# Patient Record
Sex: FEMALE | Race: WHITE | NOT HISPANIC OR LATINO | Employment: PART TIME | URBAN - METROPOLITAN AREA
[De-identification: names, ages, dates, MRNs, and addresses within clinical notes are randomized per-mention and may not be internally consistent; named-entity substitution may affect disease eponyms.]

---

## 2017-03-06 ENCOUNTER — ALLSCRIPTS OFFICE VISIT (OUTPATIENT)
Dept: OTHER | Facility: OTHER | Age: 22
End: 2017-03-06

## 2017-04-03 ENCOUNTER — GENERIC CONVERSION - ENCOUNTER (OUTPATIENT)
Dept: OTHER | Facility: OTHER | Age: 22
End: 2017-04-03

## 2017-04-03 ENCOUNTER — APPOINTMENT (OUTPATIENT)
Dept: FAMILY MEDICINE CLINIC | Facility: CLINIC | Age: 22
End: 2017-04-03

## 2017-04-03 ENCOUNTER — LAB REQUISITION (OUTPATIENT)
Dept: LAB | Facility: HOSPITAL | Age: 22
End: 2017-04-03

## 2017-04-03 DIAGNOSIS — R53.83 OTHER FATIGUE: ICD-10-CM

## 2017-04-03 LAB
25(OH)D3 SERPL-MCNC: 9.7 NG/ML (ref 30–100)
ERYTHROCYTE [DISTWIDTH] IN BLOOD BY AUTOMATED COUNT: 13.8 % (ref 11.6–15.1)
HCT VFR BLD AUTO: 42.8 % (ref 34.8–46.1)
HGB BLD-MCNC: 13.8 G/DL (ref 11.5–15.4)
MCH RBC QN AUTO: 30.1 PG (ref 26.8–34.3)
MCHC RBC AUTO-ENTMCNC: 32.2 G/DL (ref 31.4–37.4)
MCV RBC AUTO: 93 FL (ref 82–98)
PLATELET # BLD AUTO: 280 THOUSANDS/UL (ref 149–390)
PMV BLD AUTO: 11.2 FL (ref 8.9–12.7)
RBC # BLD AUTO: 4.59 MILLION/UL (ref 3.81–5.12)
T3 SERPL-MCNC: 1.5 NG/ML (ref 0.6–1.8)
T4 FREE SERPL-MCNC: 0.91 NG/DL (ref 0.78–1.33)
TSH SERPL DL<=0.05 MIU/L-ACNC: 5.63 UIU/ML (ref 0.46–3.98)
VIT B12 SERPL-MCNC: 296 PG/ML (ref 100–900)
WBC # BLD AUTO: 9.38 THOUSAND/UL (ref 4.31–10.16)

## 2017-04-03 PROCEDURE — 82607 VITAMIN B-12: CPT | Performed by: NURSE PRACTITIONER

## 2017-04-03 PROCEDURE — 86376 MICROSOMAL ANTIBODY EACH: CPT | Performed by: NURSE PRACTITIONER

## 2017-04-03 PROCEDURE — T1015 CLINIC SERVICE: HCPCS | Performed by: NURSE PRACTITIONER

## 2017-04-03 PROCEDURE — 84443 ASSAY THYROID STIM HORMONE: CPT | Performed by: NURSE PRACTITIONER

## 2017-04-03 PROCEDURE — 84480 ASSAY TRIIODOTHYRONINE (T3): CPT | Performed by: NURSE PRACTITIONER

## 2017-04-03 PROCEDURE — 84439 ASSAY OF FREE THYROXINE: CPT | Performed by: NURSE PRACTITIONER

## 2017-04-03 PROCEDURE — 82306 VITAMIN D 25 HYDROXY: CPT | Performed by: NURSE PRACTITIONER

## 2017-04-03 PROCEDURE — 85027 COMPLETE CBC AUTOMATED: CPT | Performed by: NURSE PRACTITIONER

## 2017-04-04 LAB — THYROPEROXIDASE AB SERPL-ACNC: 11 IU/ML (ref 0–26)

## 2017-04-17 ENCOUNTER — GENERIC CONVERSION - ENCOUNTER (OUTPATIENT)
Dept: OTHER | Facility: OTHER | Age: 22
End: 2017-04-17

## 2017-04-17 ENCOUNTER — APPOINTMENT (OUTPATIENT)
Dept: FAMILY MEDICINE CLINIC | Facility: CLINIC | Age: 22
End: 2017-04-17

## 2017-04-17 PROCEDURE — T1015 CLINIC SERVICE: HCPCS | Performed by: NURSE PRACTITIONER

## 2017-11-03 ENCOUNTER — GENERIC CONVERSION - ENCOUNTER (OUTPATIENT)
Dept: OTHER | Facility: OTHER | Age: 22
End: 2017-11-03

## 2017-11-03 ENCOUNTER — APPOINTMENT (OUTPATIENT)
Dept: FAMILY MEDICINE CLINIC | Facility: CLINIC | Age: 22
End: 2017-11-03
Payer: COMMERCIAL

## 2017-11-03 DIAGNOSIS — E55.9 VITAMIN D DEFICIENCY: ICD-10-CM

## 2017-11-03 DIAGNOSIS — R10.9 ABDOMINAL PAIN: ICD-10-CM

## 2017-11-03 DIAGNOSIS — E66.3 OVERWEIGHT: ICD-10-CM

## 2017-11-03 PROCEDURE — T1015 CLINIC SERVICE: HCPCS | Performed by: FAMILY MEDICINE

## 2017-11-04 ENCOUNTER — HOSPITAL ENCOUNTER (OUTPATIENT)
Dept: ULTRASOUND IMAGING | Facility: HOSPITAL | Age: 22
Discharge: HOME | End: 2017-11-04
Payer: COMMERCIAL

## 2017-11-04 DIAGNOSIS — R10.9 ABDOMINAL PAIN: ICD-10-CM

## 2017-11-04 PROCEDURE — 76700 US EXAM ABDOM COMPLETE: CPT

## 2017-11-15 ENCOUNTER — TRANSCRIBE ORDERS (OUTPATIENT)
Dept: ADMINISTRATIVE | Facility: HOSPITAL | Age: 22
End: 2017-11-15

## 2017-11-15 ENCOUNTER — ALLSCRIPTS OFFICE VISIT (OUTPATIENT)
Dept: FAMILY MEDICINE CLINIC | Facility: CLINIC | Age: 22
End: 2017-11-15
Payer: COMMERCIAL

## 2017-11-15 ENCOUNTER — HOSPITAL ENCOUNTER (OUTPATIENT)
Dept: RADIOLOGY | Facility: HOSPITAL | Age: 22
Discharge: HOME | End: 2017-11-15
Payer: COMMERCIAL

## 2017-11-15 ENCOUNTER — APPOINTMENT (OUTPATIENT)
Dept: LAB | Facility: HOSPITAL | Age: 22
End: 2017-11-15
Payer: COMMERCIAL

## 2017-11-15 DIAGNOSIS — E66.3 OVERWEIGHT: ICD-10-CM

## 2017-11-15 DIAGNOSIS — R10.9 ABDOMINAL PAIN: ICD-10-CM

## 2017-11-15 DIAGNOSIS — R10.9 STOMACH ACHE: Primary | ICD-10-CM

## 2017-11-15 DIAGNOSIS — E55.9 VITAMIN D DEFICIENCY: ICD-10-CM

## 2017-11-15 LAB
25(OH)D3 SERPL-MCNC: 21.5 NG/ML (ref 30–100)
ALBUMIN SERPL BCP-MCNC: 4 G/DL (ref 3.5–5)
ALP SERPL-CCNC: 75 U/L (ref 46–384)
ALT SERPL W P-5'-P-CCNC: 34 U/L (ref 12–78)
ANION GAP SERPL CALCULATED.3IONS-SCNC: 12 MMOL/L (ref 4–13)
AST SERPL W P-5'-P-CCNC: 14 U/L (ref 5–45)
BACTERIA UR QL AUTO: ABNORMAL /HPF
BILIRUB SERPL-MCNC: 0.2 MG/DL (ref 0.2–1)
BILIRUB UR QL STRIP: NEGATIVE
BUN SERPL-MCNC: 12 MG/DL (ref 5–25)
CALCIUM SERPL-MCNC: 9.3 MG/DL (ref 8.3–10.1)
CHLORIDE SERPL-SCNC: 105 MMOL/L (ref 100–108)
CLARITY UR: ABNORMAL
CO2 SERPL-SCNC: 25 MMOL/L (ref 21–32)
COLOR UR: YELLOW
CREAT SERPL-MCNC: 0.75 MG/DL (ref 0.6–1.3)
ERYTHROCYTE [DISTWIDTH] IN BLOOD BY AUTOMATED COUNT: 13.5 % (ref 11.6–15.1)
EST. AVERAGE GLUCOSE BLD GHB EST-MCNC: 117 MG/DL
GFR SERPL CREATININE-BSD FRML MDRD: 116 ML/MIN/1.73SQ M
GLUCOSE SERPL-MCNC: 130 MG/DL (ref 65–140)
GLUCOSE UR STRIP-MCNC: NEGATIVE MG/DL
HBA1C MFR BLD: 5.7 % (ref 4.2–6.3)
HCG SERPL QL: NEGATIVE
HCT VFR BLD AUTO: 41.7 % (ref 34.8–46.1)
HGB BLD-MCNC: 14.1 G/DL (ref 11.5–15.4)
HGB UR QL STRIP.AUTO: NEGATIVE
KETONES UR STRIP-MCNC: NEGATIVE MG/DL
LEUKOCYTE ESTERASE UR QL STRIP: ABNORMAL
MCH RBC QN AUTO: 30.2 PG (ref 26.8–34.3)
MCHC RBC AUTO-ENTMCNC: 33.8 G/DL (ref 31.4–37.4)
MCV RBC AUTO: 89 FL (ref 82–98)
NITRITE UR QL STRIP: NEGATIVE
NON-SQ EPI CELLS URNS QL MICRO: ABNORMAL /HPF
PH UR STRIP.AUTO: 6.5 [PH] (ref 4.5–8)
PLATELET # BLD AUTO: 270 THOUSANDS/UL (ref 149–390)
PMV BLD AUTO: 10.2 FL (ref 8.9–12.7)
POTASSIUM SERPL-SCNC: 3.8 MMOL/L (ref 3.5–5.3)
PROT SERPL-MCNC: 7.5 G/DL (ref 6.4–8.2)
PROT UR STRIP-MCNC: NEGATIVE MG/DL
RBC # BLD AUTO: 4.67 MILLION/UL (ref 3.81–5.12)
RBC #/AREA URNS AUTO: ABNORMAL /HPF
SODIUM SERPL-SCNC: 142 MMOL/L (ref 136–145)
SP GR UR STRIP.AUTO: 1.02 (ref 1–1.03)
TSH SERPL DL<=0.05 MIU/L-ACNC: 1.76 UIU/ML (ref 0.46–3.98)
UROBILINOGEN UR QL STRIP.AUTO: 0.2 E.U./DL
WBC # BLD AUTO: 12.13 THOUSAND/UL (ref 4.31–10.16)
WBC #/AREA URNS AUTO: ABNORMAL /HPF

## 2017-11-15 PROCEDURE — 80053 COMPREHEN METABOLIC PANEL: CPT

## 2017-11-15 PROCEDURE — 82306 VITAMIN D 25 HYDROXY: CPT

## 2017-11-15 PROCEDURE — 81001 URINALYSIS AUTO W/SCOPE: CPT

## 2017-11-15 PROCEDURE — 85027 COMPLETE CBC AUTOMATED: CPT

## 2017-11-15 PROCEDURE — 74000 HB X-RAY EXAM OF ABDOMEN (SINGLE ANTEROPOSTERIOR VIEW): CPT

## 2017-11-15 PROCEDURE — 36415 COLL VENOUS BLD VENIPUNCTURE: CPT

## 2017-11-15 PROCEDURE — 84443 ASSAY THYROID STIM HORMONE: CPT

## 2017-11-15 PROCEDURE — T1015 CLINIC SERVICE: HCPCS | Performed by: FAMILY MEDICINE

## 2017-11-15 PROCEDURE — 84703 CHORIONIC GONADOTROPIN ASSAY: CPT

## 2017-11-15 PROCEDURE — 83036 HEMOGLOBIN GLYCOSYLATED A1C: CPT

## 2017-11-18 ENCOUNTER — APPOINTMENT (OUTPATIENT)
Dept: LAB | Facility: HOSPITAL | Age: 22
End: 2017-11-18
Payer: COMMERCIAL

## 2017-11-18 DIAGNOSIS — R10.9 STOMACH ACHE: ICD-10-CM

## 2017-11-18 LAB
GLUCOSE 1.5H P LAC PO SERPL-MCNC: 114 MG/DL (ref 70–183)
GLUCOSE 15M P LAC PO SERPL-MCNC: 120 MG/DL (ref 40–500)
GLUCOSE 1H P LAC PO UR-MCNC: 126 MG/DL (ref 70–183)
GLUCOSE 2H P LAC PO SERPL-MCNC: 106 MG/DL (ref 70–183)
GLUCOSE 30M P LAC PO SERPL-MCNC: 129 MG/DL (ref 70–183)
GLUCOSE 3H P LAC PO SERPL-MCNC: 95 MG/DL (ref 70–183)
GLUCOSE P FAST SERPL-MCNC: 98 MG/DL (ref 65–99)

## 2017-11-18 PROCEDURE — 82952 GTT-ADDED SAMPLES: CPT

## 2017-11-18 PROCEDURE — 82951 GLUCOSE TOLERANCE TEST (GTT): CPT

## 2017-11-18 PROCEDURE — 36415 COLL VENOUS BLD VENIPUNCTURE: CPT

## 2018-01-10 NOTE — PROGRESS NOTES
Assessment    1  Encounter for preventive health examination (V70 0) (Z00 00)   2  Routine health maintenance (V70 0) (Z00 00)    Plan  Encounter for vision screening    · SNELLEN VISION- POC; Status:Complete;   Done: 18QJI6870 10:14AM  Routine health maintenance    · Always use a seat belt and shoulder strap when riding or driving a motor vehicle ;  Status:Complete;   Done: 98MQB9829   · Begin a limited exercise program ; Status:Complete;   Done: 83DMG1675   · Begin or continue regular aerobic exercise  Gradually work up to at least 3 sessions of  30 minutes of exercise a week ; Status:Complete;   Done: 97EKJ8608   · Decreasing the stress in your life may help your condition improve ; Status:Complete;    Done: 43XJY7784   · Diets that are low in carbohydrates and high in protein are very popular for weight loss ;  Status:Complete;   Done: 64UIB0688   · Eat a low fat and low cholesterol diet ; Status:Complete;   Done: 30OYO8617   · Keep a diary of when and what you eat ; Status:Complete;   Done: 11PTX5885   · Some eating tips that can help you lose weight ; Status:Complete;   Done: 45WRY1653   · There are many ways to reduce your risk of catching or spreading a sexually transmitted  Infection ; Status:Complete;   Done: 69UQN1214    Discussion/Summary    Follow up for healthy steps and AHA  The treatment plan was reviewed with the patient/guardian  The patient/guardian understands and agrees with the treatment plan   The patient was counseled regarding instructions for management, importance of compliance with treatment  total time of encounter was 20 minutes and 10 minutes was spent counseling  Chief Complaint  on [facility] to become established with [facility]      History of Present Illness  25year old here today to 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, working in sales at [employer]   Resides with boyfriend  No contact with father, mom lives in [facility]  Limited contact  Mom has known drug issues  No school issues  Grades good  Was on birth control, off for one year  Noticed recent weight gain  Last seen in Dr Lana Mejía years ago  Review of Systems    Constitutional: No fever, no chills, feels well, no tiredness, no recent weight gain or weight loss  Eyes: No complaints of eye pain, no red eyes, no eyesight problems, no discharge, no dry eyes, no itching of eyes  ENT: no complaints of earache, no loss of hearing, no nose bleeds, no nasal discharge, no sore throat, no hoarseness  Cardiovascular: No complaints of slow heart rate, no fast heart rate, no chest pain, no palpitations, no leg claudication, no lower extremity edema  Respiratory: No complaints of shortness of breath, no wheezing, no cough, no SOB on exertion, no orthopnea, no PND  Gastrointestinal: No complaints of abdominal pain, no constipation, no nausea or vomiting, no diarrhea, no bloody stools  Genitourinary: No complaints of dysuria, no incontinence, no pelvic pain, no dysmenorrhea, no vaginal discharge or bleeding  Musculoskeletal: No complaints of arthralgias, no myalgias, no joint swelling or stiffness, no limb pain or swelling  Integumentary: No complaints of skin rash or lesions, no itching, no skin wounds, no breast pain or lump  Neurological: No complaints of headache, no confusion, no convulsions, no numbness, no dizziness or fainting, no tingling, no limb weakness, no difficulty walking  Psychiatric: Not suicidal, no sleep disturbance, no anxiety or depression, no change in personality, no emotional problems  Endocrine: No complaints of proptosis, no hot flashes, no muscle weakness, no deepening of the voice, no feelings of weakness  Hematologic/Lymphatic: No complaints of swollen glands, no swollen glands in the neck, does not bleed easily, does not bruise easily        Past Medical History    The active problems and past medical history were reviewed and updated today  Surgical History    The surgical history was reviewed and updated today  Family History    The family history was reviewed and updated today  Social History    · Denies alcohol consumption (V49 89) (Z78 9)   · Family members smoke outdoors only   · Lives with friend   · Never a smoker  The social history was reviewed and updated today  The social history was reviewed and is unchanged  Current Meds    The medication list was reviewed and updated today  Vitals  Signs   Recorded: 14XLB3424 10:05AM   Heart Rate: 74, L Radial  Pulse Quality: Normal, L Radial  Respiration Quality: Normal  Respiration: 16  Systolic: 256, LUE, Sitting  Diastolic: 64, LUE, Sitting  Height: 5 ft 5 in  Weight: 199 lb   BMI Calculated: 33 12  BSA Calculated: 1 97  BMI Percentile: 96 %  2-20 Stature Percentile: 61 %  2-20 Weight Percentile: 97 %    Physical Exam    Constitutional   General appearance: Abnormal   overweight  Pulmonary   Respiratory effort: No increased work of breathing or signs of respiratory distress  Auscultation of lungs: Clear to auscultation  Cardiovascular   Auscultation of heart: Normal rate and rhythm, normal S1 and S2, without murmurs  Abdomen   Abdomen: Non-tender, no masses  Lymphatic   Palpation of lymph nodes in neck: No lymphadenopathy  Musculoskeletal   Gait and station: Normal     Skin   Skin and subcutaneous tissue: Normal without rashes or lesions  Psychiatric   Orientation to person, place, and time: Normal     Mood and affect: Normal          Results/Data  SNELLEN VISION- POC [id number] 10:14AM Bg Rand     Test Name Result Flag Reference   Right Eye 20/20     Left Eye 20/40     Bilateral Eyes 20/20         Procedure    Procedure: Visual Acuity Test    Indication: routine screening  Inforrmation supplied by [employer] a Snellen chart     Results: 20/20 in both eyes without corrective device, 20/20 in the right eye without corrective device, 20/40 in the left eye without corrective device   Color vision was reported by Lprice Rn and the results were abnormal    The patient was cooperative, but tolerated the procedure well  Future Appointments    Date/Time Provider Specialty Site   05/12/2017 09:30 AM Mobile Ulises Castaneda, [address] [place] [address]     Signatures   Electronically signed by :  KELL Ansari; Mar  6 2017 10:38AM EST                       (Author)    Electronically signed by : Kaden Miller MD; Mar  6 2017  7:34PM EST                       (Author)

## 2018-01-11 NOTE — MISCELLANEOUS
Message  Recent labs reviewed, brief message left on patient's voicemail  Medication prescribed as needed  Patient to call office today or Monday morning if she has questions regarding her labs  Did not leave full detailed message on voicemail  Active Problems    1  Abdominal pain (789 00) (R10 9)   2  Depression screening (V79 0) (Z13 89)   3  Encounter for vision screening (V72 0) (Z01 00)   4  Fatigue (780 79) (R53 83)   5  Overweight (278 02) (E66 3)   6  Rash (782 1) (R21)   7  Routine health maintenance (V70 0) (Z00 00)   8  UTI symptoms (788 99) (R39 9)   9  Vitamin D deficiency (268 9) (E55 9)    Current Meds   1  Vitamin D (Ergocalciferol) 28752 UNIT Oral Capsule; TAKE 1 CAPSULE WEEKLY; Therapy: 41Cdn2797 to (Last Rx:20Wks0829)  Requested for: 11Vhs8123 Ordered    Allergies    1  No Known Drug Allergies    Plan  Abdominal pain    · Dicyclomine HCl - 10 MG Oral Capsule; TAKE 1 CAPSULE Every 8 hours  Vitamin D deficiency    · Ciprofloxacin HCl - 500 MG Oral Tablet; TAKE 1 TABLET EVERY 12 HOURS DAILY   · Vitamin D (Ergocalciferol) 33070 UNIT Oral Capsule; TAKE 1 CAPSULE  WEEKLY    Signatures   Electronically signed by :  KELL De Jesus; Nov 17 2017  2:34PM EST                       (Author)

## 2018-01-12 VITALS
HEART RATE: 74 BPM | RESPIRATION RATE: 16 BRPM | BODY MASS INDEX: 33.15 KG/M2 | WEIGHT: 199 LBS | SYSTOLIC BLOOD PRESSURE: 116 MMHG | DIASTOLIC BLOOD PRESSURE: 64 MMHG | HEIGHT: 65 IN

## 2018-01-12 VITALS
WEIGHT: 203 LBS | HEART RATE: 82 BPM | DIASTOLIC BLOOD PRESSURE: 76 MMHG | HEIGHT: 66 IN | SYSTOLIC BLOOD PRESSURE: 110 MMHG | BODY MASS INDEX: 32.62 KG/M2 | RESPIRATION RATE: 17 BRPM | TEMPERATURE: 97.6 F | OXYGEN SATURATION: 98 %

## 2018-01-13 VITALS
HEART RATE: 91 BPM | OXYGEN SATURATION: 98 % | WEIGHT: 211 LBS | RESPIRATION RATE: 17 BRPM | DIASTOLIC BLOOD PRESSURE: 76 MMHG | HEIGHT: 66 IN | SYSTOLIC BLOOD PRESSURE: 122 MMHG | TEMPERATURE: 98.4 F | BODY MASS INDEX: 33.91 KG/M2

## 2018-01-14 VITALS
BODY MASS INDEX: 32.14 KG/M2 | WEIGHT: 200 LBS | HEART RATE: 87 BPM | TEMPERATURE: 96.7 F | DIASTOLIC BLOOD PRESSURE: 70 MMHG | OXYGEN SATURATION: 98 % | RESPIRATION RATE: 16 BRPM | HEIGHT: 66 IN | SYSTOLIC BLOOD PRESSURE: 110 MMHG

## 2018-01-22 VITALS
DIASTOLIC BLOOD PRESSURE: 80 MMHG | WEIGHT: 210 LBS | RESPIRATION RATE: 17 BRPM | BODY MASS INDEX: 33.75 KG/M2 | TEMPERATURE: 97.5 F | SYSTOLIC BLOOD PRESSURE: 140 MMHG | HEART RATE: 87 BPM | HEIGHT: 66 IN | OXYGEN SATURATION: 98 %

## 2018-03-08 ENCOUNTER — OFFICE VISIT (OUTPATIENT)
Dept: FAMILY MEDICINE CLINIC | Facility: CLINIC | Age: 23
End: 2018-03-08
Payer: COMMERCIAL

## 2018-03-08 VITALS
HEART RATE: 100 BPM | DIASTOLIC BLOOD PRESSURE: 70 MMHG | TEMPERATURE: 96.6 F | OXYGEN SATURATION: 97 % | BODY MASS INDEX: 35.49 KG/M2 | SYSTOLIC BLOOD PRESSURE: 108 MMHG | HEIGHT: 65 IN | WEIGHT: 213 LBS | RESPIRATION RATE: 18 BRPM

## 2018-03-08 DIAGNOSIS — G89.29 CHRONIC BILATERAL LOW BACK PAIN WITHOUT SCIATICA: Primary | ICD-10-CM

## 2018-03-08 DIAGNOSIS — M54.50 CHRONIC BILATERAL LOW BACK PAIN WITHOUT SCIATICA: Primary | ICD-10-CM

## 2018-03-08 PROCEDURE — T1015 CLINIC SERVICE: HCPCS | Performed by: FAMILY MEDICINE

## 2018-03-08 RX ORDER — DICYCLOMINE HYDROCHLORIDE 10 MG/1
1 CAPSULE ORAL EVERY 8 HOURS
COMMUNITY
Start: 2017-11-15 | End: 2018-07-10

## 2018-03-08 RX ORDER — ERGOCALCIFEROL 1.25 MG/1
1 CAPSULE ORAL WEEKLY
COMMUNITY
Start: 2017-04-17 | End: 2019-03-21 | Stop reason: SDUPTHER

## 2018-03-08 NOTE — PATIENT INSTRUCTIONS
Back Pain, Ambulatory Care   GENERAL INFORMATION:   Back pain  is common  You may feel sore or stiff on one or both sides of your back  The pain may spread to your buttocks or thighs  Back pain may be caused by an injury, lack of exercise, or obesity  Repeated bending, lifting, twisting, or lifting heavy items can also cause back pain  Seek immediate care for the following symptoms:   · Pain, numbness, or weakness in one or both legs    · Pain that is so severe, you cannot walk    · Unable to control your urine or bowel movements    · Severe back pain with chest pain    · Severe back pain, nausea, and vomiting    · Severe back pain that spreads to your side or genital area  Treatment for back pain  may include any of the following:  · NSAIDs  help decrease swelling and pain or fever  This medicine is available with or without a doctor's order  NSAIDs can cause stomach bleeding or kidney problems in certain people  If you take blood thinner medicine, always ask your healthcare provider if NSAIDs are safe for you  Always read the medicine label and follow directions  · Acetaminophen  decreases pain  It is available without a doctor's order  Ask how much to take and how often to take it  Follow directions  Acetaminophen can cause liver damage if not taken correctly  · Prescription pain medicine  may be given  Ask your healthcare provider how to take this medicine safely  Manage your back pain:   · Apply ice  on your back for 15 to 20 minutes every hour or as directed  Use an ice pack, or put crushed ice in a plastic bag  Cover it with a towel  Ice helps decrease swelling and pain  · Apply heat  on your back for 20 to 30 minutes every 2 hours for as many days as directed  Heat helps decrease pain and muscle spasms  You can alternate ice and heat  · Stay active  as much as you can without causing more pain  Bed rest could make your back pain worse  Avoid heavy lifting until your pain is gone    Follow up with your healthcare provider as directed:  Write down your questions so you remember to ask them during your visits  CARE AGREEMENT:   You have the right to help plan your care  Learn about your health condition and how it may be treated  Discuss treatment options with your caregivers to decide what care you want to receive  You always have the right to refuse treatment  The above information is an  only  It is not intended as medical advice for individual conditions or treatments  Talk to your doctor, nurse or pharmacist before following any medical regimen to see if it is safe and effective for you  © 2014 [address] is for End User's use only and may not be sold, redistributed or otherwise used for commercial purposes  All illustrations and images included in CareNotes® are the copyrighted property of [employer]  or Jong Liriano

## 2018-03-08 NOTE — PROGRESS NOTES
OFFICE VISIT  Miladys Garcia 23 y o  female MRN: [id number]      Assessment / Plan:  Diagnoses and all orders for this visit:    Chronic bilateral low back pain without sciatica  -     XR spine lumbar minimum 4 views non injury; Future    Heating pad on 20mins off 20 mins  Gel inserts for shoes  Tylenol/Motrin prn for pain relief  Reason For Visit / Chief Complaint  Chief Complaint   Patient presents with    Back Pain     She states its been going on for a while  She says its her whole back  HPI:  Miladys Garcia is a 23 y o  female presents today with chronic back pain  She reports back pain for approx 6 months  She reports pain is located in upper and lower back  She reports pain is aggravated when lifting and when her shift is over She has not used any otc medications  She denies numbness or tingling to both legs, she denies loss of bowel or bladder  She does not recall and injury or falls  At age 12, she recalls falling while roller skating  Historical Information   No past medical history on file  No past surgical history on file    Social History   History   Alcohol Use No     History   Drug Use No     History   Smoking Status    Never Smoker   Smokeless Tobacco    Never Used     Family History   Problem Relation Age of Onset    Asthma Mother     COPD Mother     Diabetes Maternal Grandmother     Hypertension Maternal Grandmother     Thyroid disease Maternal Grandmother        Meds/Allergies   Allergies no known allergies    Meds:    Current Outpatient Prescriptions:     dicyclomine (BENTYL) 10 mg capsule, Take 1 capsule by mouth every 8 (eight) hours, Disp: , Rfl:     ergocalciferol (VITAMIN D2) 50,000 units, Take 1 capsule by mouth once a week, Disp: , Rfl:       REVIEW OF SYSTEMS  A comprehensive review of systems was negative except for: Musculoskeletal: positive for  lower back pain       Current Vitals:   Blood Pressure: 108/70 (03/08/18 0755)  Pulse: 100 (03/08/18 7590)  Temperature: (!) 96 6 °F (35 9 °C) (03/08/18 0755)  Respirations: 18 (03/08/18 0755)  Height: 5' 5" (165 1 cm) (03/08/18 0755)  Weight - Scale: 96 6 kg (213 lb) (03/08/18 0755)  SpO2: 97 % (03/08/18 0755)  [unfilled]    PHYSICAL EXAMS:  General appearance: alert and oriented, in no acute distress  Lungs: clear to auscultation bilaterally  Heart: regular rate and rhythm, S1, S2 normal, no murmur, click, rub or gallop  Abdomen: abnormal findings:  obese  Extremities: extremities normal, warm and well-perfused; no cyanosis, clubbing, or edema  Pulses: 2+ and symmetric  Skin: Skin color, texture, turgor normal  No rashes or lesions{YES/NO:20        Follow up at this office in one week     Counseling / Coordination of Care  Total floor / unit time spent today 20 minutes  Greater than 50% of total time was spent with the patient and / or family counseling and / or coordination of care

## 2018-03-12 ENCOUNTER — TRANSCRIBE ORDERS (OUTPATIENT)
Dept: ADMINISTRATIVE | Facility: HOSPITAL | Age: 23
End: 2018-03-12

## 2018-03-12 ENCOUNTER — HOSPITAL ENCOUNTER (OUTPATIENT)
Dept: RADIOLOGY | Facility: HOSPITAL | Age: 23
Discharge: HOME | End: 2018-03-12
Payer: COMMERCIAL

## 2018-03-12 DIAGNOSIS — G89.29 CHRONIC BILATERAL LOW BACK PAIN WITHOUT SCIATICA: ICD-10-CM

## 2018-03-12 DIAGNOSIS — M54.50 CHRONIC BILATERAL LOW BACK PAIN WITHOUT SCIATICA: ICD-10-CM

## 2018-03-12 PROCEDURE — 72110 X-RAY EXAM L-2 SPINE 4/>VWS: CPT

## 2018-03-14 ENCOUNTER — TELEPHONE (OUTPATIENT)
Dept: FAMILY MEDICINE CLINIC | Facility: CLINIC | Age: 23
End: 2018-03-14

## 2018-03-14 DIAGNOSIS — M62.830 BACK MUSCLE SPASM: Primary | ICD-10-CM

## 2018-03-14 RX ORDER — CYCLOBENZAPRINE HCL 10 MG
10 TABLET ORAL 3 TIMES DAILY PRN
Qty: 30 TABLET | Refills: 0 | Status: SHIPPED | OUTPATIENT
Start: 2018-03-14 | End: 2018-07-10

## 2018-07-10 ENCOUNTER — OFFICE VISIT (OUTPATIENT)
Dept: FAMILY MEDICINE CLINIC | Facility: CLINIC | Age: 23
End: 2018-07-10
Payer: COMMERCIAL

## 2018-07-10 VITALS
WEIGHT: 210 LBS | TEMPERATURE: 97.5 F | DIASTOLIC BLOOD PRESSURE: 74 MMHG | BODY MASS INDEX: 34.99 KG/M2 | RESPIRATION RATE: 18 BRPM | HEIGHT: 65 IN | HEART RATE: 62 BPM | SYSTOLIC BLOOD PRESSURE: 110 MMHG | OXYGEN SATURATION: 98 %

## 2018-07-10 DIAGNOSIS — F41.8 DEPRESSION WITH ANXIETY: Primary | ICD-10-CM

## 2018-07-10 PROBLEM — E66.3 OVERWEIGHT: Status: ACTIVE | Noted: 2017-03-06

## 2018-07-10 PROBLEM — E55.9 VITAMIN D DEFICIENCY: Status: ACTIVE | Noted: 2017-04-17

## 2018-07-10 PROBLEM — R53.83 FATIGUE: Status: ACTIVE | Noted: 2017-04-03

## 2018-07-10 PROCEDURE — T1015 CLINIC SERVICE: HCPCS | Performed by: FAMILY MEDICINE

## 2018-07-10 RX ORDER — CLONAZEPAM 0.5 MG/1
0.5 TABLET ORAL 2 TIMES DAILY
Qty: 20 TABLET | Refills: 0 | Status: SHIPPED | OUTPATIENT
Start: 2018-07-10 | End: 2019-03-21

## 2018-07-10 NOTE — PROGRESS NOTES
OFFICE VISIT  Bruna Monroy 23 y o  female MRN: [id number]      Assessment / Plan:  Diagnoses and all orders for this visit:    Depression with anxiety  -     Ambulatory referral to Psychiatry; Future  -     clonazePAM (KlonoPIN) 0 5 mg tablet; Take 1 tablet (0 5 mg total) by mouth 2 (two) times a day    Spoke with Denia Mccauley today in office  Appt with Denia Rain next week  Discussed use of medication, SE      Reason For Visit / Chief Complaint  Chief Complaint   Patient presents with    Follow-up     pt believes she may have depression or anxiety         HPI:  Bruna Monroy is a 23 y o  female presents today for complaints of depression  She reports difficulty with sleeping, outburst with boyfriend  She reports this going on for a few years  She has a hard time concentrating  She is working full time  She reports past thoughts of suicide, no plan, no current suicide ideation  No previously therapy  Recent loss of father  Resides with boyfriends parents  No relationship with mother  Historical Information   No past medical history on file  No past surgical history on file  Social History   History   Alcohol Use No     History   Drug Use No     History   Smoking Status    Never Smoker   Smokeless Tobacco    Never Used     Family History   Problem Relation Age of Onset   Reesa Reichmann Asthma Mother     COPD Mother     Diabetes Maternal Grandmother     Hypertension Maternal Grandmother     Thyroid disease Maternal Grandmother        Meds/Allergies   No Known Allergies    Meds:    Current Outpatient Prescriptions:     clonazePAM (KlonoPIN) 0 5 mg tablet, Take 1 tablet (0 5 mg total) by mouth 2 (two) times a day, Disp: 20 tablet, Rfl: 0    ergocalciferol (VITAMIN D2) 50,000 units, Take 1 capsule by mouth once a week, Disp: , Rfl:       REVIEW OF SYSTEMS  A comprehensive review of systems was negative except for: Behavioral/Psych: positive for Symptoms;  Pyschiatric: anxiety, depression and sleep disturbance      Current Vitals:   Blood Pressure: 110/74 (07/10/18 0835)  Pulse: 62 (07/10/18 0835)  Temperature: 97 5 °F (36 4 °C) (07/10/18 0835)  Respirations: 18 (07/10/18 0835)  Height: 5' 5" (165 1 cm) (07/10/18 0835)  Weight - Scale: 95 3 kg (210 lb) (07/10/18 0835)  SpO2: 98 % (07/10/18 0835)  [unfilled]    PHYSICAL EXAMS:  General appearance: alert and oriented, in no acute distress  Lungs: clear to auscultation bilaterally  Heart: regular rate and rhythm, S1, S2 normal, no murmur, click, rub or gallop  Skin: Skin color, texture, turgor normal  No rashes or lesions  Lymph nodes: Cervical, supraclavicular, and axillary nodes normal   Neurologic: Grossly normal{YES/NO:20        Follow up at this office in 4-6 weeks     Counseling / Coordination of Care  Total floor / unit time spent today 20 minutes  Greater than 50% of total time was spent with the patient and / or family counseling and / or coordination of care

## 2018-07-18 ENCOUNTER — OFFICE VISIT (OUTPATIENT)
Dept: FAMILY MEDICINE CLINIC | Facility: CLINIC | Age: 23
End: 2018-07-18
Payer: COMMERCIAL

## 2018-07-18 VITALS
WEIGHT: 216 LBS | RESPIRATION RATE: 17 BRPM | BODY MASS INDEX: 35.99 KG/M2 | SYSTOLIC BLOOD PRESSURE: 110 MMHG | DIASTOLIC BLOOD PRESSURE: 78 MMHG | TEMPERATURE: 98.2 F | HEIGHT: 65 IN | HEART RATE: 98 BPM | OXYGEN SATURATION: 100 %

## 2018-07-18 DIAGNOSIS — F32.A DEPRESSION, UNSPECIFIED DEPRESSION TYPE: Primary | ICD-10-CM

## 2018-07-18 DIAGNOSIS — F33.41 RECURRENT MAJOR DEPRESSIVE DISORDER, IN PARTIAL REMISSION (HCC): Primary | ICD-10-CM

## 2018-07-18 PROCEDURE — T1015 CLINIC SERVICE: HCPCS | Performed by: FAMILY MEDICINE

## 2018-07-18 NOTE — PROGRESS NOTES
Assessment/Plan:       Patient ID: Krishna Kiran is a 23 y o  female  Pre-morbid level of function and History of Present Illness: Client reported she has some depression symptoms  Client reported symptoms include: easily irritable, mood swings, cry, never happy, isolating from peers and family  Client reported passive thoughts of self harm in the past but denied current thoughts  Client reports she also worries a lot and is easily offended  Client reports she has had these symptoms for about 10 years  Client reports she wants to work on herself for her boyfriend and their relationship  Client denied any previous mental health services  Previous Psychiatric/psychological treatment/year: none reported  Current Psychiatrist/Therapist: Deedee Rivas LCSW  Outpatient and/or Partial and Other Community Resources Used (CTT, ICM, VNA): n/a      Problem Assessment:     SOCIAL/VOCATION:  Family Constellation (include parents, relationship with each and pertinent Psych/Medical History):     Family History   Problem Relation Age of Onset    Mom: substance use      Dad: substance use                      Mother: Omayralorainegab AstudilloJonathan  Father: Shaunna Jones, passed away in April 2018 from substance use   Sibling 2 sisters: Lisa 24 and Hansa 87: lives in [address] [place] [address] with her foster mom      Krishna Kiran relates best to boyfriend  she lives with boyfriend and her sister  she does not live alone  Domestic Violence: No past history of domestic violence, There is not suspected domestic violence and There is no history of child abuse    Additional Comments related to family/relationships/peer support: Client reported she used to live in foster care for about 3-4 years  Client was 13years old when she went into the foster care system  Client reports her dad was not around growing up and reports she believes her mom was using substances  Client reports she has minimal contact with her mom   Client and her boyfriend have been together for about 7 years  Client lives with her boyfriend and her sister  Client does not talk to her extended family members  Client reports she has a few close friends  School or Work History (strengths/limitations/needs): Client reports she graduated [employer] in 2017  Client reports she currently works part time at [employer] as a   Her highest grade level achieved was  12th grade   history includes none reported    Financial status includes Client reports her boyfriend works as an  at [employer] in [place]  LEISURE ASSESSMENT (Include past and present hobbies/interests and level of involvement (Ex: Group/Club Affiliations): Client reports she likes to paint and clean  Client also reports she likes shopping  Client reports she and her peers like to go mini golf and shop  Client also reports she likes to listen to music      her primary language is [place]  Preferred language is [place]  Ethnic considerations are none reported  Religions affiliations and level of involvement none reported   Does spirituality help you cope? No     FUNCTIONAL STATUS: There has been a recent change in Maty Serum ability to do the following: n/a, client reports being able to function physically without any issues    Level of Assistance Needed/By Whom?: n/a    Maty Serum learns best by  reading, listening, demostration and picture    SUBSTANCE ABUSE ASSESSMENT: no substance abuse    HEALTH ASSESSMENT: has had decreased appetite for 5 days or more, no nausea, no vomiting and no referral to PCP needed, client reports struggling to eat but does eat 2 meals per day  Client reports she has trouble sleeping at night       LEGAL: No Mental Health Advance Directive or Power of  on file and Information packet given about Mental Health Advance Directives    Risk Assessment:   The following ratings are based on my observation of this patient over the last intake assessment    Risk of Harm to Self:   Demographic risk factors include , never  or  status and age: young adult (15-24)  Historical Risk Factors include no historical factors reported  Recent Specific Risk Factors include diagnosis of depression     Risk of Harm to Others:   Demographic Risk Factors include [age] years of age  Historical Risk Factors include none reported  Recent Specific Risk Factors include recent loss of her dad    Access to Weapons:   Zeus Perez  has access to the following weapons: no access to weapons reported  The following steps have been taken to ensure weapons are properly secured: no access to weapons reported  Based on the above information, the client presents the following risk of harm to self or others:  low    The following interventions are recommended:   contacts to treatment to include: PCP coordination    Notes regarding this Risk Assessment: Client has a history of depression symptoms  Client appears to have a traumatic past regarding her relationship with her parents  Client denied any current thoughts of self harm or homicidal thoughts  Client denied any past thoughts to harm anyone and has had passive thoughts but no intent or plan reported  Client denied access to weapons  Client has a support system with her boyfriend and peers  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  Review Of Systems:     Mood Depression   Behavior Normal    Thought Content Normal   General Normal , Emotional Problems and client appears to have unresolved trauma from her childhood   Personality Normal   Other Psych Symptoms Normal   Constitutional Feeling Tired and has some depression symptoms   client reports gaining weight                                             Mental status:  Appearance calm and cooperative , adequate hygiene and grooming and poor eye contact    Mood depressed   Affect affect was flat Speech speech soft   Thought Processes coherent/organized and normal thought processes   Hallucinations no hallucinations present    Thought Content no delusions   Abnormal Thoughts no suicidal thoughts  and no homicidal thoughts    Orientation  oriented to person and place and time   Remote Memory short term memory intact and long term memory impaired   Attention Span concentration intact       Fund of Knowledge displays adequate knowledge of current events, adequate fund of knowledge regarding past history and adequate fund of knowledge regarding vocabulary    Insight Insight intact   Judgement judgment was intact       Language no difficulty naming common objects, no difficulty repeating a phrase  and no difficulty writing a sentence    Pain none

## 2018-07-18 NOTE — PROGRESS NOTES
OFFICE VISIT  Zeus Perez 23 y o  female MRN: [id number]      Assessment / Plan:  Diagnoses and all orders for this visit:    Recurrent major depressive disorder, in partial remission ([facility] [address] )      Will follow Josué Kenyon, if worsening symptoms please call office  Reason For Visit / Chief Complaint  Chief Complaint   Patient presents with    Follow-up        HPI:  Zeus Perez is a 23 y o  female presents today for follow up  Pt was seen by Lu Woodruff  She reports feeling better, has not used any medications, prn  Historical Information   No past medical history on file  No past surgical history on file  Social History   History   Alcohol Use No     History   Drug Use No     History   Smoking Status    Never Smoker   Smokeless Tobacco    Never Used     Family History   Problem Relation Age of Onset   Deadra Guillaume Asthma Mother     COPD Mother     Diabetes Maternal Grandmother     Hypertension Maternal Grandmother     Thyroid disease Maternal Grandmother        Meds/Allergies   No Known Allergies    Meds:    Current Outpatient Prescriptions:     clonazePAM (KlonoPIN) 0 5 mg tablet, Take 1 tablet (0 5 mg total) by mouth 2 (two) times a day, Disp: 20 tablet, Rfl: 0    ergocalciferol (VITAMIN D2) 50,000 units, Take 1 capsule by mouth once a week, Disp: , Rfl:       REVIEW OF SYSTEMS  A comprehensive review of systems was negative except for: Behavioral/Psych: positive for Symptoms;  Pyschiatric: anxiety and sleep disturbance      Current Vitals:   Blood Pressure: 110/78 (07/18/18 0758)  Pulse: 98 (07/18/18 0758)  Temperature: 98 2 °F (36 8 °C) (07/18/18 0758)  Respirations: 17 (07/18/18 0758)  Height: 5' 5" (165 1 cm) (07/18/18 0758)  Weight - Scale: 98 kg (216 lb) (07/18/18 0758)  SpO2: 100 % (07/18/18 0758)  [unfilled]    PHYSICAL EXAMS:  General appearance: alert and oriented, in no acute distress  Lungs: clear to auscultation bilaterally  Heart: regular rate and rhythm, S1, S2 normal, no murmur, click, rub or gallop  Pulses: 2+ and symmetric  Skin: Skin color, texture, turgor normal  No rashes or lesions  Neurologic: Grossly normal{YES/NO:20        Follow up at this office in prn    Counseling / Coordination of Care  Total floor / unit time spent today 20 minutes  Greater than 50% of total time was spent with the patient and / or family counseling and / or coordination of care

## 2018-07-30 ENCOUNTER — OFFICE VISIT (OUTPATIENT)
Dept: FAMILY MEDICINE CLINIC | Facility: CLINIC | Age: 23
End: 2018-07-30
Payer: COMMERCIAL

## 2018-07-30 DIAGNOSIS — F41.9 ANXIETY DISORDER, UNSPECIFIED TYPE: ICD-10-CM

## 2018-07-30 DIAGNOSIS — F32.A DEPRESSION, UNSPECIFIED DEPRESSION TYPE: Primary | ICD-10-CM

## 2018-07-30 PROCEDURE — T1015 CLINIC SERVICE: HCPCS | Performed by: FAMILY MEDICINE

## 2018-07-30 NOTE — PROGRESS NOTES
NAME: Renetta Ramirez  : [birth year]    Date of Initial Treatment Plan: 18  Date of Current Treatment Plan: 18      Treatment Plan Number: 1     Strengths/Personal Resources for Self Care: Client reports her strengths are: listening, being organized, creative, and resilient  Client reports self care: sleep, paint, listen to music, cleaning, walking, and being with her dog  Client reports her fiance is a big resource for her  Diagnosis:   Depression NOS   Anxiety NOS  Rule out: Major Recurrent Depression Disorder    Area of Needs: Client reports her area of needs are being healthier (better sleep, loose weight, and eat heathier), not to overwhelm herself  Client reports she takes out her feelings on others  Client will work on emotional regulation skills  Client also reports she needs to work on not being so negative  LONG TERM GOALS:     GOAL 1:  " I want to work on using my coping skills more"-client    Target Date: 18  Completion Date:   ____________________________________________________________________    GOAL 2: n/a    Target Date:   Completion Date:   ____________________________________________________________________    GOAL 3:  n/a    Target Date:   Completion Date:   ____________________________________________________________________    SHORT OBJECTIVES:     GOAL 1: Client will express 2-3 feelings in session and identify 1-2 coping skills to utilize in the social environment  Client will utilize her coping skills 4-5 times per week  Target Date: 18  Completion Date:   Modality: Individual       2   x per month                             Person (s) responsible for carrying out plan: LCSW, client     ____________________________________________________________________    GOAL 2: Client will follow all PCP recommendations and attend all PCP appointments  Client will continue to assess if medication is needed and will talk to her PCP if needed       Target Date: 11/30/18  Completion Date:     Modality: as needed                          Person (s) responsible for carrying out plan:  PCP, client   ____________________________________________________________________    GOAL 3:  n/a    Target Date:   Completion Date:     Modality: Individual                x per month                             Person (s) responsible for carrying out plan: The first scheduled review date is 11/30/18  The expected length of service is 4 months    Behavioral Health Treatment Plan [facility]: Diagnosis and Treatment Plan explained to client  Client relates understanding diagnosis and is agreeable to Treatment Plan         CLIENT COMMENTS / Please share your thoughts, feelings, need and/or experiences regarding your treatment plan:       __________________________________________________________________    __________________________________________________________________    __________________________________________________________________    __________________________________________________________________    _______________________________________     Date/Time: ______________           Patient Signature: _________________________________     Date/Time: ______________

## 2018-08-03 ENCOUNTER — TELEPHONE (OUTPATIENT)
Dept: FAMILY MEDICINE CLINIC | Facility: CLINIC | Age: 23
End: 2018-08-03

## 2018-08-08 ENCOUNTER — OFFICE VISIT (OUTPATIENT)
Dept: FAMILY MEDICINE CLINIC | Facility: CLINIC | Age: 23
End: 2018-08-08
Payer: COMMERCIAL

## 2018-08-08 DIAGNOSIS — F32.A DEPRESSION, UNSPECIFIED DEPRESSION TYPE: Primary | ICD-10-CM

## 2018-08-08 PROCEDURE — T1015 CLINIC SERVICE: HCPCS | Performed by: FAMILY MEDICINE

## 2018-08-13 ENCOUNTER — TELEPHONE (OUTPATIENT)
Dept: FAMILY MEDICINE CLINIC | Facility: CLINIC | Age: 23
End: 2018-08-13

## 2018-08-13 NOTE — PROGRESS NOTES
Psychotherapy Provided: Individual Psychotherapy 70 minutes          Goals addressed in session: LCSW worked on building engagement with client  Interventions:  LCSW used reflective listening in session  Assessment and Plan:  D: LCSW met with client for individual session  LCSW used a client center approach by actively listening and providing a safe space to release emotions  Using reflective listening, LCSW and client worked on building engagement  LCSW and client processed client's current feelings and client discussed background history  LCSW validated client's feelings  A: Client was oriented to time, place, and person  Client did not present with suicidal or homicidal thoughts  Client presented with appropriate hygiene, minimal eye contact, and normal speech  Client was alert and engaged in session  P: client meet in 2 weeks for individual session      Pain:  No pain reported        Current suicide risk : Sangeetha 1153: Diagnosis and Treatment Plan explained to Vipul Bonilla   relates understanding diagnosis and is agreeable to Treatment Plan  Yes

## 2018-08-21 ENCOUNTER — TELEPHONE (OUTPATIENT)
Dept: FAMILY MEDICINE CLINIC | Facility: CLINIC | Age: 23
End: 2018-08-21

## 2018-12-31 ENCOUNTER — DOCUMENTATION (OUTPATIENT)
Dept: FAMILY MEDICINE CLINIC | Facility: CLINIC | Age: 23
End: 2018-12-31

## 2018-12-31 NOTE — PROGRESS NOTES
Assessment/Plan:          Patient ID: Bhumi Lindsay is a 26 y o  female  Outpatient Discharge Summary:   Admission Date: 7/18/18  Garry Laboy was referred by PCP for depression symptoms  Discharge Date:  8/21/18    Discharge Diagnosis:    Depression NOS    Treating Physician: Omar Carmichael  Treatment Complications: Client stopped engaging after first session  Presenting Problem: Client was referred to [facility] services by PCP for depression related symptoms  Client had passive self harm thoughts but denied self harm thoughts during sessions  Client also had a complex trauma history which contributed to her mental health  Course of treatment includes:    individual therapy   Treatment Progress: no progress was made as client stopped attending sessions and discontinued services  Criteria for Discharge: client discontinued services  Aftercare recommendations include client will follow up with PCP if she would like to re engage services     Discharge Medications include:  Current Outpatient Prescriptions:     clonazePAM (KlonoPIN) 0 5 mg tablet, Take 1 tablet (0 5 mg total) by mouth 2 (two) times a day, Disp: 20 tablet, Rfl: 0    ergocalciferol (VITAMIN D2) 50,000 units, Take 1 capsule by mouth once a week, Disp: , Rfl:     Prognosis: no progress was made due to client discontinuing services

## 2019-03-21 ENCOUNTER — OFFICE VISIT (OUTPATIENT)
Dept: FAMILY MEDICINE CLINIC | Facility: HOME HEALTHCARE | Age: 24
End: 2019-03-21
Payer: COMMERCIAL

## 2019-03-21 VITALS
HEART RATE: 80 BPM | OXYGEN SATURATION: 98 % | TEMPERATURE: 97.7 F | SYSTOLIC BLOOD PRESSURE: 118 MMHG | DIASTOLIC BLOOD PRESSURE: 78 MMHG | HEIGHT: 65 IN | BODY MASS INDEX: 37.65 KG/M2 | RESPIRATION RATE: 18 BRPM | WEIGHT: 226 LBS

## 2019-03-21 DIAGNOSIS — E55.9 VITAMIN D DEFICIENCY: ICD-10-CM

## 2019-03-21 DIAGNOSIS — M54.50 CHRONIC BILATERAL LOW BACK PAIN WITHOUT SCIATICA: Primary | ICD-10-CM

## 2019-03-21 DIAGNOSIS — G89.29 CHRONIC BILATERAL LOW BACK PAIN WITHOUT SCIATICA: Primary | ICD-10-CM

## 2019-03-21 PROCEDURE — T1015 CLINIC SERVICE: HCPCS | Performed by: FAMILY MEDICINE

## 2019-03-21 RX ORDER — METHOCARBAMOL 500 MG/1
500 TABLET, FILM COATED ORAL 4 TIMES DAILY
Qty: 30 TABLET | Refills: 0 | Status: SHIPPED | OUTPATIENT
Start: 2019-03-21 | End: 2019-05-13

## 2019-03-21 RX ORDER — ERGOCALCIFEROL 1.25 MG/1
50000 CAPSULE ORAL WEEKLY
Qty: 12 CAPSULE | Refills: 0 | Status: SHIPPED | OUTPATIENT
Start: 2019-03-21 | End: 2019-05-24 | Stop reason: SDUPTHER

## 2019-03-21 NOTE — PROGRESS NOTES
OFFICE VISIT  Madelon Halsted 26 y o  female MRN: [id number]      Assessment / Plan:  Diagnoses and all orders for this visit:    Chronic bilateral low back pain without sciatica  -     Ambulatory referral to Physical Therapy; Future  -     methocarbamol (ROBAXIN) 500 mg tablet; Take 1 tablet (500 mg total) by mouth 4 (four) times a day    Vitamin D deficiency  -     ergocalciferol (VITAMIN D2) 50,000 units; Take 1 capsule (50,000 Units total) by mouth once a week      Heat as needed  Start PT, needing stretching and strengthening exercises  Robaxin prn  Vit d def- resume vit d, discussed vit d enriched foods, 20  Minutes of sunlight     Reason For Visit / Chief Complaint  Chief Complaint   Patient presents with    Back Pain     also neck pain, shoulder pain and going into left side of chest     Medication Refill     vitamin D        HPI:  Madelon Halsted is a 26 y o  female who presents today for back and neck pain  She reports having pain after she has started exercises  She has started arm, crutches, jump roping  She reports drinking no water  Historical Information   History reviewed  No pertinent past medical history  History reviewed  No pertinent surgical history    Social History   Social History     Substance and Sexual Activity   Alcohol Use No     Social History     Substance and Sexual Activity   Drug Use No     Social History     Tobacco Use   Smoking Status Never Smoker   Smokeless Tobacco Never Used     Family History   Problem Relation Age of Onset    Asthma Mother     COPD Mother     Diabetes Maternal Grandmother     Hypertension Maternal Grandmother     Thyroid disease Maternal Grandmother        Meds/Allergies   No Known Allergies    Meds:    Current Outpatient Medications:     ergocalciferol (VITAMIN D2) 50,000 units, Take 1 capsule (50,000 Units total) by mouth once a week, Disp: 12 capsule, Rfl: 0    methocarbamol (ROBAXIN) 500 mg tablet, Take 1 tablet (500 mg total) by mouth 4 (four) times a day, Disp: 30 tablet, Rfl: 0      REVIEW OF SYSTEMS  Review of Systems   Constitutional: Negative for chills, fatigue and fever  HENT: Negative for congestion, ear discharge, ear pain, sore throat, trouble swallowing and voice change  Eyes: Negative for pain and redness  Respiratory: Negative for cough, chest tightness, shortness of breath and wheezing  Gastrointestinal: Negative for abdominal pain, blood in stool, constipation, diarrhea, nausea and vomiting  Endocrine: Negative for cold intolerance, heat intolerance, polydipsia, polyphagia and polyuria  Genitourinary: Negative for decreased urine volume, dysuria, frequency and urgency  Musculoskeletal: Positive for back pain  Negative for arthralgias, myalgias and neck pain  Skin: Negative for color change and rash  Neurological: Negative for dizziness, syncope, weakness, light-headedness, numbness and headaches  Psychiatric/Behavioral: Negative for sleep disturbance and suicidal ideas  The patient is not nervous/anxious  Current Vitals:   Blood Pressure: 118/78 (03/21/19 0754)  Pulse: 80 (03/21/19 0754)  Temperature: 97 7 °F (36 5 °C) (03/21/19 0754)  Respirations: 18 (03/21/19 0754)  Height: 5' 5" (165 1 cm) (03/21/19 0754)  Weight - Scale: 103 kg (226 lb) (03/21/19 0754)  SpO2: 98 % (03/21/19 0754)  [unfilled]    PHYSICAL EXAMS:  Physical Exam   Constitutional: She is oriented to person, place, and time  She appears well-developed and well-nourished  HENT:   Head: Normocephalic  Right Ear: External ear normal    Left Ear: External ear normal    Mouth/Throat: Oropharynx is clear and moist    Eyes: Pupils are equal, round, and reactive to light  Conjunctivae are normal    Neck: Neck supple  Cardiovascular: Normal rate and regular rhythm  Pulmonary/Chest: Effort normal and breath sounds normal    Abdominal: Soft  Bowel sounds are normal  She exhibits no distension  There is no tenderness  Musculoskeletal: Normal range of motion  Neurological: She is alert and oriented to person, place, and time  Skin: Skin is warm and dry  Psychiatric: She has a normal mood and affect  Follow up at this office in 4-6 weeks     Counseling / Coordination of Care  Total floor / unit time spent today 20 minutes  Greater than 50% of total time was spent with the patient and / or family counseling and / or coordination of care

## 2019-03-28 ENCOUNTER — EVALUATION (OUTPATIENT)
Dept: PHYSICAL THERAPY | Facility: HOME HEALTHCARE | Age: 24
End: 2019-03-28
Payer: COMMERCIAL

## 2019-03-28 DIAGNOSIS — G89.29 CHRONIC BILATERAL LOW BACK PAIN WITHOUT SCIATICA: ICD-10-CM

## 2019-03-28 DIAGNOSIS — M54.50 CHRONIC BILATERAL LOW BACK PAIN WITHOUT SCIATICA: ICD-10-CM

## 2019-03-28 PROCEDURE — 97535 SELF CARE MNGMENT TRAINING: CPT | Performed by: PHYSICAL THERAPIST

## 2019-03-28 PROCEDURE — 97161 PT EVAL LOW COMPLEX 20 MIN: CPT | Performed by: PHYSICAL THERAPIST

## 2019-03-28 PROCEDURE — 97110 THERAPEUTIC EXERCISES: CPT | Performed by: PHYSICAL THERAPIST

## 2019-03-28 NOTE — PROGRESS NOTES
PT Evaluation     Today's date: 3/28/2019  Patient name: Anitha Wei  : 1995  MRN: [id number]  Referring provider: KELL Youngblood  Dx:   Encounter Diagnosis     ICD-10-CM    1  Chronic bilateral low back pain without sciatica M54 5 Ambulatory referral to Physical Therapy    G89 29                   Assessment  Assessment details: Pt Anjelica Maravilla is a 26 y o  who presents to OPPT with s/s consistent with chronic LBP  PT notes no specific signs of HNP or pinched nerves at this time  Significant muscle tightness noted throughout lumbar spine, with core weakness and BLE weakness also noted  Pt remains independent with all activities but states increased pain with prolonged activities such as walking, household chores, and gym routine  Pt would benefit from skilled therapy services to address outlined impairments, work towards goals, and restore pts PLOF  Thank you!    Impairments: abnormal or restricted ROM, activity intolerance, impaired physical strength, lacks appropriate home exercise program, pain with function, poor posture  and poor body mechanics  Understanding of Dx/Px/POC: good   Prognosis: good    Goals  STG: to be achieved within 4 weeks   Decrease pain 25-50%  L/S ROM WNL throughout   Improve strength by 1/2 grade     LTG: to be achieved within 8 weeks   Independent with HEP   ADL function returned to PLOF  Pt able to ambulate > 1 hour without increase in pain  Pt able to complete gym routine without increase in pain   Postural awareness improved to good     Plan  Patient would benefit from: skilled physical therapy  Planned modality interventions: TENS, thermotherapy: hydrocollator packs and cryotherapy  Planned therapy interventions: abdominal trunk stabilization, manual therapy, neuromuscular re-education, balance, home exercise program, functional ROM exercises, flexibility, postural training, strengthening, stretching and therapeutic exercise  Frequency: 2x week  Duration in weeks: 8  Plan of Care beginning date: 3/28/2019  Plan of Care expiration date: 2019  Treatment plan discussed with: patient        Subjective Evaluation    History of Present Illness  Mechanism of injury: Pt reporting that she has been having lower back pain for several years  No accident or injury; pt states "it runs in my family"  Pt states pain has been getting gradually worse and x-rays last year showed muscle spasms  She has had no other treatments completed and physician is now referring to OPPT for conservative management of s/s  Quality of life: good    Pain  At best pain ratin  At worst pain ratin  Quality: sharp and dull ache  Relieving factors: heat  Progression: no change    Social Support  Steps to enter house: no  Stairs in house: yes   Lives in: multiple-level home  Lives with: significant other    Employment status: working    Diagnostic Tests  X-ray: abnormal  Treatments  Current treatment: physical therapy  Patient Goals  Patient goals for therapy: decreased pain, increased motion, increased strength and independence with ADLs/IADLs  Patient goal: "be able to work out without pain"         Objective     Postural Observations  Seated posture: fair  Standing posture: fair        Palpation   Left   Muscle spasm in the erector spinae and lumbar paraspinals  Right   Muscle spasm in the erector spinae and lumbar paraspinals  Tenderness     Left Hip   Tenderness in the PSIS       Neurological Testing     Sensation     Lumbar   Left   Intact: light touch    Right   Intact: light touch    Active Range of Motion     Lumbar   Flexion:  WFL  Extension:  with pain Restriction level: minimal  Left lateral flexion:  WFL  Right lateral flexion:  WFL    Ambulation     Ambulation: Level Surfaces     Additional Level Surfaces Ambulation Details  Tolerance x 1 hour    Ambulation: Stairs   Pattern: reciprocal  Railings: one rail  Pattern: reciprocal  Railings: one rail    General Comments:      Hip Comments   B LE ROM grossly WFL  B LE MMT grossly 4/5 throughout         Precautions: None  RE Due: 4/28/19  Specialty Daily Treatment Diary     Manual  3/28/19                            Exercise Diary  3/28/19       NuStep  Level 1  10 minutes       SLR x 3         HR/TR        Squats         Step-ups         SBB        PPT        PPT with marches        SLR        S/L SLR        Clamshells        Hip abd        Hip add        Bridges        LTR        SKTC        Heel walk-outs        Quad alt UE        Quad alt LE         MTP/LTP            Modalities 3/28/19       MHP prn

## 2019-04-02 ENCOUNTER — OFFICE VISIT (OUTPATIENT)
Dept: PHYSICAL THERAPY | Facility: HOME HEALTHCARE | Age: 24
End: 2019-04-02
Payer: COMMERCIAL

## 2019-04-02 DIAGNOSIS — M54.50 CHRONIC BILATERAL LOW BACK PAIN WITHOUT SCIATICA: Primary | ICD-10-CM

## 2019-04-02 DIAGNOSIS — G89.29 CHRONIC BILATERAL LOW BACK PAIN WITHOUT SCIATICA: Primary | ICD-10-CM

## 2019-04-02 PROCEDURE — 97110 THERAPEUTIC EXERCISES: CPT

## 2019-04-08 ENCOUNTER — OFFICE VISIT (OUTPATIENT)
Dept: PHYSICAL THERAPY | Facility: HOME HEALTHCARE | Age: 24
End: 2019-04-08
Payer: COMMERCIAL

## 2019-04-08 DIAGNOSIS — G89.29 CHRONIC BILATERAL LOW BACK PAIN WITHOUT SCIATICA: Primary | ICD-10-CM

## 2019-04-08 DIAGNOSIS — M54.50 CHRONIC BILATERAL LOW BACK PAIN WITHOUT SCIATICA: Primary | ICD-10-CM

## 2019-04-08 PROCEDURE — 97110 THERAPEUTIC EXERCISES: CPT

## 2019-04-10 ENCOUNTER — APPOINTMENT (OUTPATIENT)
Dept: PHYSICAL THERAPY | Facility: HOME HEALTHCARE | Age: 24
End: 2019-04-10
Payer: COMMERCIAL

## 2019-05-13 ENCOUNTER — OFFICE VISIT (OUTPATIENT)
Dept: FAMILY MEDICINE CLINIC | Facility: HOME HEALTHCARE | Age: 24
End: 2019-05-13
Payer: COMMERCIAL

## 2019-05-13 VITALS
BODY MASS INDEX: 38.32 KG/M2 | HEIGHT: 65 IN | HEART RATE: 102 BPM | OXYGEN SATURATION: 98 % | DIASTOLIC BLOOD PRESSURE: 72 MMHG | SYSTOLIC BLOOD PRESSURE: 112 MMHG | RESPIRATION RATE: 17 BRPM | TEMPERATURE: 98.2 F | WEIGHT: 230 LBS

## 2019-05-13 DIAGNOSIS — G62.9 NEUROPATHY: ICD-10-CM

## 2019-05-13 DIAGNOSIS — E66.9 OBESITY (BMI 30-39.9): ICD-10-CM

## 2019-05-13 DIAGNOSIS — M77.8 HAND TENDONITIS: Primary | ICD-10-CM

## 2019-05-13 PROCEDURE — T1015 CLINIC SERVICE: HCPCS | Performed by: FAMILY MEDICINE

## 2019-05-13 RX ORDER — DICLOFENAC SODIUM 25 MG/1
50 TABLET, DELAYED RELEASE ORAL 2 TIMES DAILY
Qty: 30 TABLET | Refills: 0 | Status: SHIPPED | OUTPATIENT
Start: 2019-05-13 | End: 2020-11-16

## 2019-05-13 RX ORDER — GABAPENTIN 100 MG/1
300 CAPSULE ORAL
Qty: 30 CAPSULE | Refills: 0 | Status: SHIPPED | OUTPATIENT
Start: 2019-05-13 | End: 2020-11-16

## 2019-05-20 ENCOUNTER — APPOINTMENT (OUTPATIENT)
Dept: LAB | Facility: HOSPITAL | Age: 24
End: 2019-05-20
Payer: COMMERCIAL

## 2019-05-20 DIAGNOSIS — E66.9 OBESITY (BMI 30-39.9): ICD-10-CM

## 2019-05-20 LAB
25(OH)D3 SERPL-MCNC: 18.4 NG/ML (ref 30–100)
ALBUMIN SERPL BCP-MCNC: 3.8 G/DL (ref 3.5–5)
ALP SERPL-CCNC: 65 U/L (ref 46–116)
ALT SERPL W P-5'-P-CCNC: 22 U/L (ref 12–78)
ANION GAP SERPL CALCULATED.3IONS-SCNC: 9 MMOL/L (ref 4–13)
AST SERPL W P-5'-P-CCNC: 12 U/L (ref 5–45)
BASOPHILS # BLD AUTO: 0.01 THOUSANDS/ΜL (ref 0–0.1)
BASOPHILS NFR BLD AUTO: 0 % (ref 0–1)
BILIRUB SERPL-MCNC: 0.3 MG/DL (ref 0.2–1)
BUN SERPL-MCNC: 14 MG/DL (ref 5–25)
CALCIUM SERPL-MCNC: 9 MG/DL (ref 8.3–10.1)
CHLORIDE SERPL-SCNC: 104 MMOL/L (ref 100–108)
CHOLEST SERPL-MCNC: 171 MG/DL (ref 50–200)
CO2 SERPL-SCNC: 26 MMOL/L (ref 21–32)
CREAT SERPL-MCNC: 0.79 MG/DL (ref 0.6–1.3)
EOSINOPHIL # BLD AUTO: 0.09 THOUSAND/ΜL (ref 0–0.61)
EOSINOPHIL NFR BLD AUTO: 1 % (ref 0–6)
ERYTHROCYTE [DISTWIDTH] IN BLOOD BY AUTOMATED COUNT: 14 % (ref 11.6–15.1)
EST. AVERAGE GLUCOSE BLD GHB EST-MCNC: 117 MG/DL
GFR SERPL CREATININE-BSD FRML MDRD: 107 ML/MIN/1.73SQ M
GLUCOSE P FAST SERPL-MCNC: 87 MG/DL (ref 65–99)
HBA1C MFR BLD: 5.7 % (ref 4.2–6.3)
HCT VFR BLD AUTO: 42.2 % (ref 34.8–46.1)
HDLC SERPL-MCNC: 45 MG/DL (ref 40–60)
HGB BLD-MCNC: 13.7 G/DL (ref 11.5–15.4)
LDLC SERPL CALC-MCNC: 108 MG/DL (ref 0–100)
LYMPHOCYTES # BLD AUTO: 3.07 THOUSANDS/ΜL (ref 0.6–4.47)
LYMPHOCYTES NFR BLD AUTO: 35 % (ref 14–44)
MCH RBC QN AUTO: 29.6 PG (ref 26.8–34.3)
MCHC RBC AUTO-ENTMCNC: 32.5 G/DL (ref 31.4–37.4)
MCV RBC AUTO: 91 FL (ref 82–98)
MONOCYTES # BLD AUTO: 0.7 THOUSAND/ΜL (ref 0.17–1.22)
MONOCYTES NFR BLD AUTO: 8 % (ref 4–12)
NEUTROPHILS # BLD AUTO: 4.98 THOUSANDS/ΜL (ref 1.85–7.62)
NEUTS SEG NFR BLD AUTO: 56 % (ref 43–75)
PLATELET # BLD AUTO: 269 THOUSANDS/UL (ref 149–390)
PMV BLD AUTO: 10.4 FL (ref 8.9–12.7)
POTASSIUM SERPL-SCNC: 4 MMOL/L (ref 3.5–5.3)
PROT SERPL-MCNC: 7.7 G/DL (ref 6.4–8.2)
RBC # BLD AUTO: 4.63 MILLION/UL (ref 3.81–5.12)
SODIUM SERPL-SCNC: 139 MMOL/L (ref 136–145)
T4 FREE SERPL-MCNC: 0.92 NG/DL (ref 0.76–1.46)
TRIGL SERPL-MCNC: 88 MG/DL
TSH SERPL DL<=0.05 MIU/L-ACNC: 4.57 UIU/ML (ref 0.36–3.74)
WBC # BLD AUTO: 8.85 THOUSAND/UL (ref 4.31–10.16)

## 2019-05-20 PROCEDURE — 84443 ASSAY THYROID STIM HORMONE: CPT

## 2019-05-20 PROCEDURE — 36415 COLL VENOUS BLD VENIPUNCTURE: CPT

## 2019-05-20 PROCEDURE — 80061 LIPID PANEL: CPT

## 2019-05-20 PROCEDURE — 85025 COMPLETE CBC W/AUTO DIFF WBC: CPT

## 2019-05-20 PROCEDURE — 82306 VITAMIN D 25 HYDROXY: CPT

## 2019-05-20 PROCEDURE — 83036 HEMOGLOBIN GLYCOSYLATED A1C: CPT

## 2019-05-20 PROCEDURE — 84439 ASSAY OF FREE THYROXINE: CPT

## 2019-05-20 PROCEDURE — 80053 COMPREHEN METABOLIC PANEL: CPT

## 2019-05-24 ENCOUNTER — TELEPHONE (OUTPATIENT)
Dept: FAMILY MEDICINE CLINIC | Facility: HOME HEALTHCARE | Age: 24
End: 2019-05-24

## 2019-05-24 DIAGNOSIS — E55.9 VITAMIN D DEFICIENCY: ICD-10-CM

## 2019-05-24 RX ORDER — ERGOCALCIFEROL 1.25 MG/1
50000 CAPSULE ORAL WEEKLY
Qty: 12 CAPSULE | Refills: 0 | Status: SHIPPED | OUTPATIENT
Start: 2019-05-24 | End: 2021-02-19 | Stop reason: ALTCHOICE

## 2019-05-28 ENCOUNTER — OFFICE VISIT (OUTPATIENT)
Dept: FAMILY MEDICINE CLINIC | Facility: HOME HEALTHCARE | Age: 24
End: 2019-05-28
Payer: COMMERCIAL

## 2019-05-28 VITALS
HEART RATE: 115 BPM | RESPIRATION RATE: 16 BRPM | DIASTOLIC BLOOD PRESSURE: 80 MMHG | WEIGHT: 228 LBS | OXYGEN SATURATION: 97 % | BODY MASS INDEX: 37.99 KG/M2 | SYSTOLIC BLOOD PRESSURE: 132 MMHG | HEIGHT: 65 IN | TEMPERATURE: 97.4 F

## 2019-05-28 DIAGNOSIS — L20.84 INTRINSIC ECZEMA: Primary | ICD-10-CM

## 2019-05-28 PROCEDURE — T1015 CLINIC SERVICE: HCPCS | Performed by: FAMILY MEDICINE

## 2019-05-28 RX ORDER — DIAPER,BRIEF,INFANT-TODD,DISP
EACH MISCELLANEOUS 2 TIMES DAILY
Qty: 30 G | Refills: 1 | Status: SHIPPED | OUTPATIENT
Start: 2019-05-28 | End: 2021-02-19 | Stop reason: ALTCHOICE

## 2019-06-03 ENCOUNTER — OFFICE VISIT (OUTPATIENT)
Dept: OBGYN CLINIC | Facility: CLINIC | Age: 24
End: 2019-06-03
Payer: COMMERCIAL

## 2019-06-03 VITALS
HEIGHT: 65 IN | SYSTOLIC BLOOD PRESSURE: 118 MMHG | BODY MASS INDEX: 38.39 KG/M2 | WEIGHT: 230.4 LBS | DIASTOLIC BLOOD PRESSURE: 74 MMHG

## 2019-06-03 DIAGNOSIS — Z01.419 ENCOUNTER FOR ROUTINE GYNECOLOGICAL EXAMINATION WITH PAPANICOLAOU SMEAR OF CERVIX: Primary | ICD-10-CM

## 2019-06-03 PROCEDURE — 99385 PREV VISIT NEW AGE 18-39: CPT | Performed by: OBSTETRICS & GYNECOLOGY

## 2019-06-05 LAB
CLINICAL INFO: NORMAL
CYTO CVX: NORMAL
CYTOLOGY CMNT CVX/VAG CYTO-IMP: NORMAL
DATE PREVIOUS BX: NORMAL
LMP START DATE: NORMAL
SL AMB PREV. PAP:: NORMAL
SPECIMEN SOURCE CVX/VAG CYTO: NORMAL

## 2019-06-19 ENCOUNTER — OFFICE VISIT (OUTPATIENT)
Dept: FAMILY MEDICINE CLINIC | Facility: HOME HEALTHCARE | Age: 24
End: 2019-06-19
Payer: COMMERCIAL

## 2019-06-19 VITALS
SYSTOLIC BLOOD PRESSURE: 118 MMHG | TEMPERATURE: 98.4 F | OXYGEN SATURATION: 99 % | BODY MASS INDEX: 37.82 KG/M2 | HEIGHT: 65 IN | RESPIRATION RATE: 16 BRPM | DIASTOLIC BLOOD PRESSURE: 84 MMHG | WEIGHT: 227 LBS | HEART RATE: 84 BPM

## 2019-06-19 DIAGNOSIS — L72.9 INFECTED CYST OF SKIN: ICD-10-CM

## 2019-06-19 DIAGNOSIS — L08.9 INFECTED CYST OF SKIN: ICD-10-CM

## 2019-06-19 DIAGNOSIS — J01.10 ACUTE NON-RECURRENT FRONTAL SINUSITIS: Primary | ICD-10-CM

## 2019-06-19 PROCEDURE — T1015 CLINIC SERVICE: HCPCS | Performed by: FAMILY MEDICINE

## 2019-06-19 RX ORDER — CLINDAMYCIN HYDROCHLORIDE 150 MG/1
300 CAPSULE ORAL EVERY 6 HOURS SCHEDULED
Qty: 56 CAPSULE | Refills: 0 | Status: SHIPPED | OUTPATIENT
Start: 2019-06-19 | End: 2019-06-26

## 2020-06-27 ENCOUNTER — OFFICE VISIT (OUTPATIENT)
Dept: URGENT CARE | Facility: CLINIC | Age: 25
End: 2020-06-27
Payer: COMMERCIAL

## 2020-06-27 ENCOUNTER — APPOINTMENT (OUTPATIENT)
Dept: RADIOLOGY | Facility: CLINIC | Age: 25
End: 2020-06-27
Payer: COMMERCIAL

## 2020-06-27 VITALS
WEIGHT: 215 LBS | BODY MASS INDEX: 35.82 KG/M2 | HEIGHT: 65 IN | RESPIRATION RATE: 16 BRPM | OXYGEN SATURATION: 100 % | DIASTOLIC BLOOD PRESSURE: 60 MMHG | TEMPERATURE: 98.7 F | SYSTOLIC BLOOD PRESSURE: 126 MMHG | HEART RATE: 84 BPM

## 2020-06-27 DIAGNOSIS — S99.912A ANKLE INJURY, LEFT, INITIAL ENCOUNTER: ICD-10-CM

## 2020-06-27 DIAGNOSIS — S93.402A SPRAIN OF LEFT ANKLE, UNSPECIFIED LIGAMENT, INITIAL ENCOUNTER: Primary | ICD-10-CM

## 2020-06-27 PROCEDURE — 73610 X-RAY EXAM OF ANKLE: CPT

## 2020-06-27 PROCEDURE — 99213 OFFICE O/P EST LOW 20 MIN: CPT | Performed by: PHYSICIAN ASSISTANT

## 2020-06-27 RX ORDER — CLINDAMYCIN PHOSPHATE 11.9 MG/ML
SOLUTION TOPICAL
COMMUNITY
Start: 2020-06-17 | End: 2021-02-19 | Stop reason: ALTCHOICE

## 2020-06-27 RX ORDER — DOXYCYCLINE HYCLATE 100 MG/1
CAPSULE ORAL
COMMUNITY
Start: 2020-06-16 | End: 2021-02-19 | Stop reason: ALTCHOICE

## 2020-06-27 RX ORDER — BENZOYL PEROXIDE 50 MG/ML
LIQUID TOPICAL
COMMUNITY
Start: 2020-06-17 | End: 2021-02-19 | Stop reason: ALTCHOICE

## 2020-06-27 RX ORDER — KETOCONAZOLE 20 MG/ML
SHAMPOO TOPICAL
COMMUNITY
Start: 2020-06-16 | End: 2021-02-19 | Stop reason: ALTCHOICE

## 2020-08-04 ENCOUNTER — ANNUAL EXAM (OUTPATIENT)
Dept: OBGYN CLINIC | Facility: MEDICAL CENTER | Age: 25
End: 2020-08-04
Payer: COMMERCIAL

## 2020-08-04 VITALS
TEMPERATURE: 98.4 F | DIASTOLIC BLOOD PRESSURE: 82 MMHG | BODY MASS INDEX: 36.49 KG/M2 | WEIGHT: 219.3 LBS | SYSTOLIC BLOOD PRESSURE: 110 MMHG

## 2020-08-04 DIAGNOSIS — Z01.419 ENCOUNTER FOR GYNECOLOGICAL EXAMINATION: Primary | ICD-10-CM

## 2020-08-04 PROCEDURE — S0612 ANNUAL GYNECOLOGICAL EXAMINA: HCPCS | Performed by: OBSTETRICS & GYNECOLOGY

## 2020-08-04 NOTE — PROGRESS NOTES
ASSESSMENT & PLAN: Trip Massey is a 27 y o  Paige Freedman with normal gynecologic exam     1   Routine well woman exam done today  2  Pap:  The patient's last pap was 2019  It was normal     Pap was not done today  Current ASCCP Guidelines reviewed  3   STD testing  was not done   4  Gardasil recommendations reviewed  5  The following were reviewed in today's visit: breast self exam, family planning choices, exercise and healthy diet  6  Attempting to conceive - Prenatal vitamins encouraged     CC:  Annual Gynecologic Examination    HPI: Trip Massey is a 27 y o  Paigegab Freedman who presents for annual gynecologic examination  She has the following concerns:  None     Health Maintenance:    She wears her seatbelt routinely  She does perform regular monthly self breast exams  She feels safe at home  History reviewed  No pertinent past medical history  History reviewed  No pertinent surgical history  OB/Gyn History:    Pt does not have menstrual issues  History of sexually transmitted infection: No   History of abnormal pap smears: No      Patient is currently sexually active  The current method of family planning is none      OB History        0    Para   0    Term   0       0    AB   0    Living   0       SAB   0    TAB   0    Ectopic   0    Multiple   0    Live Births   0                 Family History   Problem Relation Age of Onset   [facility] Asthma Mother     COPD Mother     Diabetes Maternal Grandmother     Hypertension Maternal Grandmother     Thyroid disease Maternal Grandmother        Social History:  Social History     Socioeconomic History    Marital status: Single     Spouse name: Not on file    Number of children: Not on file    Years of education: Not on file    Highest education level: Not on file   Occupational History    Not on file   Social Needs    Financial resource strain: Not on file    Food insecurity     Worry: Not on file     Inability: Not on file    Transportation needs     Medical: Not on file     Non-medical: Not on file   Tobacco Use    Smoking status: Never Smoker    Smokeless tobacco: Never Used   Substance and Sexual Activity    Alcohol use: No    Drug use: No    Sexual activity: Yes     Partners: Male   Lifestyle    Physical activity     Days per week: Not on file     Minutes per session: Not on file    Stress: Not on file   Relationships    Social connections     Talks on phone: Not on file     Gets together: Not on file     Attends Mandaen service: Not on file     Active member of club or organization: Not on file     Attends meetings of clubs or organizations: Not on file     Relationship status: Not on file    Intimate partner violence     Fear of current or ex partner: Not on file     Emotionally abused: Not on file     Physically abused: Not on file     Forced sexual activity: Not on file   Other Topics Concern    Not on file   Social History Narrative    Not on file     Patient is        No Known Allergies      Current Outpatient Medications:     BENZOYL PEROXIDE 5 % external wash, WASH THIGHS DAILY, Disp: , Rfl:     clindamycin (CLEOCIN T) 1 % external solution, APPLY TO THIGHS TWICE A DAY, Disp: , Rfl:     diclofenac (VOLTAREN) 25 MG EC tablet, Take 2 tablets (50 mg total) by mouth 2 (two) times a day (Patient not taking: Reported on 6/27/2020), Disp: 30 tablet, Rfl: 0    doxycycline hyclate (VIBRAMYCIN) 100 mg capsule, take 1 capsule by mouth twice a day with food for 2 weeks, Disp: , Rfl:     ergocalciferol (VITAMIN D2) 50,000 units, Take 1 capsule (50,000 Units total) by mouth once a week (Patient not taking: Reported on 6/27/2020), Disp: 12 capsule, Rfl: 0    gabapentin (NEURONTIN) 100 mg capsule, Take 3 capsules (300 mg total) by mouth daily at bedtime (Patient not taking: Reported on 8/4/2020), Disp: 30 capsule, Rfl: 0    hydrocortisone 0 5 % cream, Apply topically 2 (two) times a day (Patient not taking: Reported on 6/27/2020), Disp: 30 g, Rfl: 1    hydrocortisone 2 5 % cream, APPLY TO AFFECTED AREA ON FACE TWICE A DAY FOR 3-4 DAYS A WEEK AS     (REFER TO PRESCRIPTION NOTES)  , Disp: , Rfl:     ketoconazole (NIZORAL) 2 % shampoo, SHAMPOO SCALP/FACE 2-3 TIMES A WEEK AS NEEDED FOR REDNESS/ITCHING/FLAKING, Disp: , Rfl:     Review of Systems:  Constitutional :no fever, feels well, no tiredness, no recent weight gain or loss  ENT: no ear ache, no loss of hearing, no nosebleeds or nasal discharge, no sore throat or hoarseness  Cardiovascular: no complaints of slow or fast heart beat, no chest pain, no palpitations, no leg claudication or lower extremity edema  Respiratory: no complaints of shortness of shortness of breath, no SALINAS  Breasts:no complaints of breast pain, breast lump, or nipple discharge  Gastrointestinal: no complaints of abdominal pain, constipation, nausea, vomiting, or diarrhea or bloody stools  Genitourinary : no complaints of dysuria, incontinence, pelvic pain, no dysmenorrhea, vaginal discharge or abnormal vaginal bleeding and as noted in HPI  Musculoskeletal: no complaints of arthralgia, no myalgia, no joint swelling or stiffness, no limb pain or swelling    Integumentary: no complaints of skin rash or lesion, itching or dry skin  Neurological: no complaints of headache, no confusion, no numbness or tingling, no dizziness or fainting    Objective      /82   Temp 98 4 °F (36 9 °C)   Wt 99 5 kg (219 lb 4 8 oz)   LMP 07/08/2020 (Exact Date)   Breastfeeding No   BMI 36 49 kg/m²     General:   appears stated age, cooperative, alert normal mood and affect   Neck: normal, supple,trachea midline, no masses   Heart: regular rate and rhythm, S1, S2 normal, no murmur, click, rub or gallop   Lungs: clear to auscultation bilaterally   Breasts: normal appearance, no masses or tenderness   Abdomen: soft, non-tender, without masses or organomegaly   Vulva: normal female genitalia   Vagina: normal vagina, no discharge, exudate, lesion, or erythema   Urethra: normal   Cervix: Normal, no discharge  Nontender  Uterus: normal size, contour, position, consistency, mobility, non-tender   Adnexa: no mass, fullness, tenderness   Skin normal skin turgor and no rashes     Psychiatric orientation to person, place, and time: normal  mood and affect: normal

## 2020-09-08 ENCOUNTER — TELEPHONE (OUTPATIENT)
Dept: FAMILY MEDICINE CLINIC | Facility: CLINIC | Age: 25
End: 2020-09-08

## 2020-09-08 DIAGNOSIS — Z32.01 POSITIVE PREGNANCY TEST: Primary | ICD-10-CM

## 2020-09-09 ENCOUNTER — LAB (OUTPATIENT)
Dept: LAB | Facility: HOSPITAL | Age: 25
End: 2020-09-09
Attending: OBSTETRICS & GYNECOLOGY
Payer: COMMERCIAL

## 2020-09-09 DIAGNOSIS — Z32.01 POSITIVE PREGNANCY TEST: ICD-10-CM

## 2020-09-09 LAB
ABO GROUP BLD: NORMAL
B-HCG SERPL-ACNC: 50 MIU/ML
BLD GP AB SCN SERPL QL: NEGATIVE
PROGEST SERPL-MCNC: 10.8 NG/ML
RH BLD: POSITIVE
SPECIMEN EXPIRATION DATE: NORMAL

## 2020-09-09 PROCEDURE — 86901 BLOOD TYPING SEROLOGIC RH(D): CPT

## 2020-09-09 PROCEDURE — 84144 ASSAY OF PROGESTERONE: CPT

## 2020-09-09 PROCEDURE — 86850 RBC ANTIBODY SCREEN: CPT

## 2020-09-09 PROCEDURE — 86900 BLOOD TYPING SEROLOGIC ABO: CPT

## 2020-09-09 PROCEDURE — 84702 CHORIONIC GONADOTROPIN TEST: CPT

## 2020-09-09 PROCEDURE — 36415 COLL VENOUS BLD VENIPUNCTURE: CPT

## 2020-09-10 DIAGNOSIS — Z32.01 POSITIVE URINE PREGNANCY TEST: ICD-10-CM

## 2020-09-10 DIAGNOSIS — Z32.01 POSITIVE PREGNANCY TEST: Primary | ICD-10-CM

## 2020-09-17 ENCOUNTER — LAB (OUTPATIENT)
Dept: LAB | Facility: HOSPITAL | Age: 25
End: 2020-09-17
Attending: OBSTETRICS & GYNECOLOGY
Payer: COMMERCIAL

## 2020-09-17 DIAGNOSIS — Z32.01 POSITIVE URINE PREGNANCY TEST: ICD-10-CM

## 2020-09-17 LAB
B-HCG SERPL-ACNC: 1675 MIU/ML
PROGEST SERPL-MCNC: 7.7 NG/ML

## 2020-09-17 PROCEDURE — 84702 CHORIONIC GONADOTROPIN TEST: CPT

## 2020-09-17 PROCEDURE — 84144 ASSAY OF PROGESTERONE: CPT

## 2020-09-17 PROCEDURE — 36415 COLL VENOUS BLD VENIPUNCTURE: CPT

## 2020-09-18 DIAGNOSIS — N92.6 MISSED MENSES: Primary | ICD-10-CM

## 2020-09-21 ENCOUNTER — APPOINTMENT (OUTPATIENT)
Dept: LAB | Facility: HOSPITAL | Age: 25
End: 2020-09-21
Attending: OBSTETRICS & GYNECOLOGY
Payer: COMMERCIAL

## 2020-09-21 DIAGNOSIS — Z32.01 POSITIVE BLOOD PREGNANCY TEST: Primary | ICD-10-CM

## 2020-09-21 LAB — B-HCG SERPL-ACNC: 4978 MIU/ML

## 2020-09-21 PROCEDURE — 84702 CHORIONIC GONADOTROPIN TEST: CPT

## 2020-09-21 PROCEDURE — 36415 COLL VENOUS BLD VENIPUNCTURE: CPT

## 2020-10-06 ENCOUNTER — HOSPITAL ENCOUNTER (OUTPATIENT)
Dept: ULTRASOUND IMAGING | Facility: HOSPITAL | Age: 25
Discharge: HOME | End: 2020-10-06
Attending: OBSTETRICS & GYNECOLOGY
Payer: COMMERCIAL

## 2020-10-06 DIAGNOSIS — Z32.01 POSITIVE BLOOD PREGNANCY TEST: ICD-10-CM

## 2020-10-06 PROCEDURE — 76801 OB US < 14 WKS SINGLE FETUS: CPT

## 2020-10-26 ENCOUNTER — INITIAL PRENATAL (OUTPATIENT)
Dept: OBGYN CLINIC | Facility: MEDICAL CENTER | Age: 25
End: 2020-10-26

## 2020-10-26 DIAGNOSIS — Z34.91 ENCOUNTER FOR PREGNANCY RELATED EXAMINATION IN FIRST TRIMESTER: Primary | ICD-10-CM

## 2020-10-26 PROCEDURE — OBC

## 2020-11-03 ENCOUNTER — OFFICE VISIT (OUTPATIENT)
Dept: FAMILY MEDICINE CLINIC | Facility: HOME HEALTHCARE | Age: 25
End: 2020-11-03
Payer: COMMERCIAL

## 2020-11-03 VITALS
TEMPERATURE: 98.1 F | OXYGEN SATURATION: 98 % | HEART RATE: 73 BPM | WEIGHT: 224.8 LBS | SYSTOLIC BLOOD PRESSURE: 112 MMHG | RESPIRATION RATE: 16 BRPM | DIASTOLIC BLOOD PRESSURE: 68 MMHG | BODY MASS INDEX: 37.41 KG/M2

## 2020-11-03 DIAGNOSIS — Z00.00 ENCOUNTER FOR ANNUAL PHYSICAL EXAMINATION EXCLUDING GYNECOLOGICAL EXAMINATION IN A PATIENT OLDER THAN 17 YEARS: Primary | ICD-10-CM

## 2020-11-03 DIAGNOSIS — Z13.29 SCREENING FOR ENDOCRINE, NUTRITIONAL, METABOLIC AND IMMUNITY DISORDER: ICD-10-CM

## 2020-11-03 DIAGNOSIS — Z13.228 SCREENING FOR ENDOCRINE, NUTRITIONAL, METABOLIC AND IMMUNITY DISORDER: ICD-10-CM

## 2020-11-03 DIAGNOSIS — Z13.0 SCREENING FOR ENDOCRINE, NUTRITIONAL, METABOLIC AND IMMUNITY DISORDER: ICD-10-CM

## 2020-11-03 DIAGNOSIS — Z13.21 SCREENING FOR ENDOCRINE, NUTRITIONAL, METABOLIC AND IMMUNITY DISORDER: ICD-10-CM

## 2020-11-03 PROCEDURE — 99395 PREV VISIT EST AGE 18-39: CPT | Performed by: FAMILY MEDICINE

## 2020-11-06 ENCOUNTER — TRANSCRIBE ORDERS (OUTPATIENT)
Dept: ADMINISTRATIVE | Facility: HOSPITAL | Age: 25
End: 2020-11-06

## 2020-11-06 ENCOUNTER — APPOINTMENT (OUTPATIENT)
Dept: LAB | Facility: HOSPITAL | Age: 25
End: 2020-11-06
Attending: OBSTETRICS & GYNECOLOGY
Payer: COMMERCIAL

## 2020-11-06 DIAGNOSIS — Z13.228 SCREENING FOR ENDOCRINE, NUTRITIONAL, METABOLIC AND IMMUNITY DISORDER: ICD-10-CM

## 2020-11-06 DIAGNOSIS — Z13.29 SCREENING FOR ENDOCRINE, NUTRITIONAL, METABOLIC AND IMMUNITY DISORDER: ICD-10-CM

## 2020-11-06 DIAGNOSIS — Z13.0 SCREENING FOR ENDOCRINE, NUTRITIONAL, METABOLIC AND IMMUNITY DISORDER: ICD-10-CM

## 2020-11-06 DIAGNOSIS — Z34.91 ENCOUNTER FOR PREGNANCY RELATED EXAMINATION IN FIRST TRIMESTER: ICD-10-CM

## 2020-11-06 DIAGNOSIS — Z13.21 SCREENING FOR ENDOCRINE, NUTRITIONAL, METABOLIC AND IMMUNITY DISORDER: ICD-10-CM

## 2020-11-06 DIAGNOSIS — Z34.01 ENCOUNTER FOR SUPERVISION OF NORMAL FIRST PREGNANCY IN FIRST TRIMESTER: Primary | ICD-10-CM

## 2020-11-06 DIAGNOSIS — Z34.01 ENCOUNTER FOR SUPERVISION OF NORMAL FIRST PREGNANCY IN FIRST TRIMESTER: ICD-10-CM

## 2020-11-06 LAB
ABO GROUP BLD: NORMAL
ALBUMIN SERPL BCP-MCNC: 3.5 G/DL (ref 3.5–5)
ALP SERPL-CCNC: 51 U/L (ref 46–116)
ALT SERPL W P-5'-P-CCNC: 20 U/L (ref 12–78)
ANION GAP SERPL CALCULATED.3IONS-SCNC: 9 MMOL/L (ref 4–13)
AST SERPL W P-5'-P-CCNC: 13 U/L (ref 5–45)
BACTERIA UR QL AUTO: ABNORMAL /HPF
BASOPHILS # BLD AUTO: 0.02 THOUSANDS/ΜL (ref 0–0.1)
BASOPHILS NFR BLD AUTO: 0 % (ref 0–1)
BILIRUB SERPL-MCNC: 0.3 MG/DL (ref 0.2–1)
BILIRUB UR QL STRIP: NEGATIVE
BLD GP AB SCN SERPL QL: NEGATIVE
BUN SERPL-MCNC: 6 MG/DL (ref 5–25)
CALCIUM SERPL-MCNC: 9.2 MG/DL (ref 8.3–10.1)
CHLORIDE SERPL-SCNC: 102 MMOL/L (ref 100–108)
CHOLEST SERPL-MCNC: 168 MG/DL (ref 50–200)
CLARITY UR: ABNORMAL
CO2 SERPL-SCNC: 23 MMOL/L (ref 21–32)
COLOR UR: YELLOW
CREAT SERPL-MCNC: 0.56 MG/DL (ref 0.6–1.3)
EOSINOPHIL # BLD AUTO: 0 THOUSAND/ΜL (ref 0–0.61)
EOSINOPHIL NFR BLD AUTO: 0 % (ref 0–6)
ERYTHROCYTE [DISTWIDTH] IN BLOOD BY AUTOMATED COUNT: 13.3 % (ref 11.6–15.1)
EST. AVERAGE GLUCOSE BLD GHB EST-MCNC: 108 MG/DL
GFR SERPL CREATININE-BSD FRML MDRD: 133 ML/MIN/1.73SQ M
GLUCOSE P FAST SERPL-MCNC: 83 MG/DL (ref 65–99)
GLUCOSE UR STRIP-MCNC: NEGATIVE MG/DL
HBA1C MFR BLD: 5.4 %
HBV SURFACE AG SER QL: NORMAL
HCT VFR BLD AUTO: 41 % (ref 34.8–46.1)
HDLC SERPL-MCNC: 55 MG/DL
HGB BLD-MCNC: 13.8 G/DL (ref 11.5–15.4)
HGB UR QL STRIP.AUTO: NEGATIVE
IMM GRANULOCYTES # BLD AUTO: 0.02 THOUSAND/UL (ref 0–0.2)
IMM GRANULOCYTES NFR BLD AUTO: 0 % (ref 0–2)
KETONES UR STRIP-MCNC: NEGATIVE MG/DL
LDLC SERPL CALC-MCNC: 90 MG/DL (ref 0–100)
LEUKOCYTE ESTERASE UR QL STRIP: ABNORMAL
LYMPHOCYTES # BLD AUTO: 2.67 THOUSANDS/ΜL (ref 0.6–4.47)
LYMPHOCYTES NFR BLD AUTO: 25 % (ref 14–44)
MCH RBC QN AUTO: 30.7 PG (ref 26.8–34.3)
MCHC RBC AUTO-ENTMCNC: 33.7 G/DL (ref 31.4–37.4)
MCV RBC AUTO: 91 FL (ref 82–98)
MONOCYTES # BLD AUTO: 0.71 THOUSAND/ΜL (ref 0.17–1.22)
MONOCYTES NFR BLD AUTO: 7 % (ref 4–12)
NEUTROPHILS # BLD AUTO: 7.33 THOUSANDS/ΜL (ref 1.85–7.62)
NEUTS SEG NFR BLD AUTO: 68 % (ref 43–75)
NITRITE UR QL STRIP: NEGATIVE
NON-SQ EPI CELLS URNS QL MICRO: ABNORMAL /HPF
NRBC BLD AUTO-RTO: 0 /100 WBCS
PH UR STRIP.AUTO: 6.5 [PH]
PLATELET # BLD AUTO: 237 THOUSANDS/UL (ref 149–390)
PMV BLD AUTO: 9.9 FL (ref 8.9–12.7)
POTASSIUM SERPL-SCNC: 3.9 MMOL/L (ref 3.5–5.3)
PROT SERPL-MCNC: 7.6 G/DL (ref 6.4–8.2)
PROT UR STRIP-MCNC: NEGATIVE MG/DL
RBC # BLD AUTO: 4.49 MILLION/UL (ref 3.81–5.12)
RBC #/AREA URNS AUTO: ABNORMAL /HPF
RH BLD: POSITIVE
RUBV IGG SERPL IA-ACNC: 45.3 IU/ML
SODIUM SERPL-SCNC: 134 MMOL/L (ref 136–145)
SP GR UR STRIP.AUTO: 1.02 (ref 1–1.03)
SPECIMEN EXPIRATION DATE: NORMAL
TRIGL SERPL-MCNC: 117 MG/DL
TSH SERPL DL<=0.05 MIU/L-ACNC: 1.87 UIU/ML (ref 0.36–3.74)
UROBILINOGEN UR QL STRIP.AUTO: 0.2 E.U./DL
WBC # BLD AUTO: 10.75 THOUSAND/UL (ref 4.31–10.16)
WBC #/AREA URNS AUTO: ABNORMAL /HPF

## 2020-11-06 PROCEDURE — 87086 URINE CULTURE/COLONY COUNT: CPT

## 2020-11-06 PROCEDURE — 80053 COMPREHEN METABOLIC PANEL: CPT

## 2020-11-06 PROCEDURE — 80081 OBSTETRIC PANEL INC HIV TSTG: CPT

## 2020-11-06 PROCEDURE — 81001 URINALYSIS AUTO W/SCOPE: CPT

## 2020-11-06 PROCEDURE — 87591 N.GONORRHOEAE DNA AMP PROB: CPT

## 2020-11-06 PROCEDURE — 87491 CHLMYD TRACH DNA AMP PROBE: CPT

## 2020-11-06 PROCEDURE — 84443 ASSAY THYROID STIM HORMONE: CPT

## 2020-11-06 PROCEDURE — 83036 HEMOGLOBIN GLYCOSYLATED A1C: CPT

## 2020-11-06 PROCEDURE — 80061 LIPID PANEL: CPT

## 2020-11-06 PROCEDURE — 36415 COLL VENOUS BLD VENIPUNCTURE: CPT

## 2020-11-07 LAB
BACTERIA UR CULT: NORMAL
C TRACH DNA SPEC QL NAA+PROBE: NEGATIVE
HIV 1+2 AB+HIV1 P24 AG SERPL QL IA: NORMAL
N GONORRHOEA DNA SPEC QL NAA+PROBE: NEGATIVE
SCAN RESULT: NORMAL

## 2020-11-09 LAB — RPR SER QL: NORMAL

## 2020-11-12 ENCOUNTER — INITIAL PRENATAL (OUTPATIENT)
Dept: OBGYN CLINIC | Facility: MEDICAL CENTER | Age: 25
End: 2020-11-12

## 2020-11-12 VITALS — WEIGHT: 224.5 LBS | BODY MASS INDEX: 37.36 KG/M2 | SYSTOLIC BLOOD PRESSURE: 118 MMHG | DIASTOLIC BLOOD PRESSURE: 60 MMHG

## 2020-11-12 DIAGNOSIS — E55.9 VITAMIN D DEFICIENCY: ICD-10-CM

## 2020-11-12 DIAGNOSIS — O99.210 OBESITY IN PREGNANCY: ICD-10-CM

## 2020-11-12 DIAGNOSIS — Z3A.12 12 WEEKS GESTATION OF PREGNANCY: Primary | ICD-10-CM

## 2020-11-12 PROBLEM — E66.3 OVERWEIGHT: Status: RESOLVED | Noted: 2017-03-06 | Resolved: 2020-11-12

## 2020-11-12 PROCEDURE — PNV: Performed by: STUDENT IN AN ORGANIZED HEALTH CARE EDUCATION/TRAINING PROGRAM

## 2020-11-12 RX ORDER — MELATONIN
2000 DAILY
Qty: 180 TABLET | Refills: 3 | Status: SHIPPED | OUTPATIENT
Start: 2020-11-12 | End: 2022-06-20

## 2020-11-13 ENCOUNTER — TELEPHONE (OUTPATIENT)
Dept: OBGYN CLINIC | Facility: MEDICAL CENTER | Age: 25
End: 2020-11-13

## 2020-11-13 ENCOUNTER — TELEPHONE (OUTPATIENT)
Dept: PERINATAL CARE | Facility: CLINIC | Age: 25
End: 2020-11-13

## 2020-11-16 ENCOUNTER — ROUTINE PRENATAL (OUTPATIENT)
Dept: PERINATAL CARE | Facility: OTHER | Age: 25
End: 2020-11-16
Payer: COMMERCIAL

## 2020-11-16 VITALS
WEIGHT: 225 LBS | BODY MASS INDEX: 37.49 KG/M2 | HEIGHT: 65 IN | DIASTOLIC BLOOD PRESSURE: 81 MMHG | HEART RATE: 112 BPM | SYSTOLIC BLOOD PRESSURE: 127 MMHG | TEMPERATURE: 98 F

## 2020-11-16 DIAGNOSIS — O99.210 OBESITY IN PREGNANCY: ICD-10-CM

## 2020-11-16 DIAGNOSIS — Z36.82 ENCOUNTER FOR ANTENATAL SCREENING FOR NUCHAL TRANSLUCENCY: Primary | ICD-10-CM

## 2020-11-16 PROCEDURE — 3008F BODY MASS INDEX DOCD: CPT | Performed by: OBSTETRICS & GYNECOLOGY

## 2020-11-16 PROCEDURE — 99242 OFF/OP CONSLTJ NEW/EST SF 20: CPT | Performed by: OBSTETRICS & GYNECOLOGY

## 2020-11-16 PROCEDURE — 76813 OB US NUCHAL MEAS 1 GEST: CPT | Performed by: OBSTETRICS & GYNECOLOGY

## 2020-11-16 PROCEDURE — 1036F TOBACCO NON-USER: CPT | Performed by: OBSTETRICS & GYNECOLOGY

## 2020-11-16 PROCEDURE — NC001 PR NO CHARGE: Performed by: OBSTETRICS & GYNECOLOGY

## 2020-11-23 LAB — MISSING INFO: NORMAL

## 2020-11-24 ENCOUNTER — DOCUMENTATION (OUTPATIENT)
Dept: PERINATAL CARE | Facility: CLINIC | Age: 25
End: 2020-11-24

## 2020-11-24 LAB
# FETUSES US: 1
AGE: NORMAL
B-HCG ADJ MOM SERPL: 1.73
B-HCG SERPL-ACNC: 109.1 IU/ML
COLLECT DATE: NORMAL
CURRENT SMOKER: NO
DONATED EGG PATIENT QL: NO
FET CRL US.MEAS: 70 MM
FET CRL US.MEAS: NORMAL MM
FET NUCHAL FOLD MOM THICKNESS US.MEAS: 1.41
FET NUCHAL FOLD THICKNESS US.MEAS: 2.3 MM
FET NUCHAL FOLD THICKNESS US.MEAS: NORMAL MM
FET TS 21 RISK FROM MAT AGE: NORMAL
GA CLIN EST: 13.3 WK
GA METHOD: NORMAL
HX OF NTD NARR: NO
HX OF TRISOMY 21 NARR: NO
IDDM PATIENT QL: NO
INTEGRATED SCN PATIENT-IMP: NORMAL
PAPP-A MOM SERPL: 0.92
PAPP-A SERPL-MCNC: 721.5 NG/ML
PHYSICIAN NPI: NORMAL
SL AMB NASAL BONE: PRESENT
SL AMB NTQR LOCATION ID#: NORMAL
SL AMB NTQR READING PHYS ID#: NORMAL
SL AMB REFERRING PHYSICIAN NAME: NORMAL
SL AMB REFERRING PHYSICIAN PHONE: NORMAL
SL AMB REPEAT SPECIMEN: NO
SL AMB TWIN B NASAL BONE: NORMAL
SONOGRAPHER NAME: NORMAL
SONOGRAPHER: NORMAL
SONOGRAPHER: NORMAL
TS 18 RISK FETUS: NORMAL
TS 21 RISK FETUS: NORMAL
US DATE: NORMAL
US FETUSES STUDY [IMP]: NORMAL

## 2020-11-30 ENCOUNTER — TELEPHONE (OUTPATIENT)
Dept: PERINATAL CARE | Facility: CLINIC | Age: 25
End: 2020-11-30

## 2020-12-10 ENCOUNTER — ROUTINE PRENATAL (OUTPATIENT)
Dept: OBGYN CLINIC | Facility: MEDICAL CENTER | Age: 25
End: 2020-12-10

## 2020-12-10 VITALS — SYSTOLIC BLOOD PRESSURE: 100 MMHG | BODY MASS INDEX: 36.94 KG/M2 | WEIGHT: 222 LBS | DIASTOLIC BLOOD PRESSURE: 60 MMHG

## 2020-12-10 DIAGNOSIS — Z3A.16 16 WEEKS GESTATION OF PREGNANCY: ICD-10-CM

## 2020-12-10 DIAGNOSIS — O99.210 OBESITY IN PREGNANCY: Primary | ICD-10-CM

## 2020-12-10 PROCEDURE — PNV: Performed by: OBSTETRICS & GYNECOLOGY

## 2020-12-10 RX ORDER — ASPIRIN 81 MG/1
81 TABLET ORAL DAILY
COMMUNITY
End: 2021-05-20 | Stop reason: HOSPADM

## 2020-12-14 ENCOUNTER — DOCUMENTATION (OUTPATIENT)
Dept: PERINATAL CARE | Facility: CLINIC | Age: 25
End: 2020-12-14

## 2020-12-14 ENCOUNTER — TELEPHONE (OUTPATIENT)
Dept: PERINATAL CARE | Facility: CLINIC | Age: 25
End: 2020-12-14

## 2020-12-18 ENCOUNTER — DOCUMENTATION (OUTPATIENT)
Dept: PERINATAL CARE | Facility: CLINIC | Age: 25
End: 2020-12-18

## 2020-12-22 ENCOUNTER — TELEPHONE (OUTPATIENT)
Dept: PERINATAL CARE | Facility: CLINIC | Age: 25
End: 2020-12-22

## 2021-01-06 ENCOUNTER — TELEPHONE (OUTPATIENT)
Dept: PERINATAL CARE | Facility: OTHER | Age: 26
End: 2021-01-06

## 2021-01-06 PROBLEM — Z34.02 ENCOUNTER FOR SUPERVISION OF NORMAL FIRST PREGNANCY IN SECOND TRIMESTER: Status: ACTIVE | Noted: 2021-01-06

## 2021-01-06 PROBLEM — Z3A.16 16 WEEKS GESTATION OF PREGNANCY: Status: RESOLVED | Noted: 2020-11-12 | Resolved: 2021-01-06

## 2021-01-07 ENCOUNTER — ROUTINE PRENATAL (OUTPATIENT)
Dept: OBGYN CLINIC | Facility: MEDICAL CENTER | Age: 26
End: 2021-01-07
Payer: COMMERCIAL

## 2021-01-07 ENCOUNTER — ROUTINE PRENATAL (OUTPATIENT)
Dept: PERINATAL CARE | Facility: OTHER | Age: 26
End: 2021-01-07
Payer: COMMERCIAL

## 2021-01-07 VITALS
BODY MASS INDEX: 37.82 KG/M2 | DIASTOLIC BLOOD PRESSURE: 78 MMHG | WEIGHT: 227 LBS | SYSTOLIC BLOOD PRESSURE: 120 MMHG | HEIGHT: 65 IN

## 2021-01-07 VITALS
WEIGHT: 227 LBS | DIASTOLIC BLOOD PRESSURE: 69 MMHG | BODY MASS INDEX: 37.82 KG/M2 | HEART RATE: 86 BPM | HEIGHT: 65 IN | SYSTOLIC BLOOD PRESSURE: 122 MMHG

## 2021-01-07 DIAGNOSIS — Z3A.20 20 WEEKS GESTATION OF PREGNANCY: Primary | ICD-10-CM

## 2021-01-07 DIAGNOSIS — Z3A.28 28 WEEKS GESTATION OF PREGNANCY: ICD-10-CM

## 2021-01-07 DIAGNOSIS — O99.212 MATERNAL OBESITY, ANTEPARTUM, SECOND TRIMESTER: ICD-10-CM

## 2021-01-07 DIAGNOSIS — Z36.86 ENCOUNTER FOR ANTENATAL SCREENING FOR CERVICAL LENGTH: ICD-10-CM

## 2021-01-07 DIAGNOSIS — O99.210 OBESITY IN PREGNANCY: Primary | ICD-10-CM

## 2021-01-07 DIAGNOSIS — Z3A.20 20 WEEKS GESTATION OF PREGNANCY: ICD-10-CM

## 2021-01-07 LAB — GLUCOSE 1H P 50 G GLC PO SERPL-MCNC: 175 MG/DL

## 2021-01-07 PROCEDURE — 76811 OB US DETAILED SNGL FETUS: CPT | Performed by: OBSTETRICS & GYNECOLOGY

## 2021-01-07 PROCEDURE — 82950 GLUCOSE TEST: CPT | Performed by: OBSTETRICS & GYNECOLOGY

## 2021-01-07 PROCEDURE — 76817 TRANSVAGINAL US OBSTETRIC: CPT | Performed by: OBSTETRICS & GYNECOLOGY

## 2021-01-07 PROCEDURE — 3008F BODY MASS INDEX DOCD: CPT | Performed by: OBSTETRICS & GYNECOLOGY

## 2021-01-07 PROCEDURE — 99213 OFFICE O/P EST LOW 20 MIN: CPT | Performed by: OBSTETRICS & GYNECOLOGY

## 2021-01-07 PROCEDURE — 36415 COLL VENOUS BLD VENIPUNCTURE: CPT | Performed by: OBSTETRICS & GYNECOLOGY

## 2021-01-07 PROCEDURE — PNV: Performed by: OBSTETRICS & GYNECOLOGY

## 2021-01-07 NOTE — PROGRESS NOTES
Kerri Ward is a 23y o  year old  at 20w2d for routine prenatal visit    + FM, no vaginal bleeding, contractions, or LOF  Complaints: No   Most recent ultrasound and labs reviewed      MFM - this am  Normal growth   Posterior placenta    Sequential  1 low risk  Step 2 - not completed yet     Obesity - will do early GTT if normal will repeat at 28 weeks     Went over the weight gain she is at 12 pounds did recommend watching should not gain more the 15 total

## 2021-01-07 NOTE — PROGRESS NOTES
Ultrasound Probe Disinfection    A transvaginal ultrasound was performed  Prior to use, disinfection was performed with High Level Disinfection Process (Trophon)  Probe serial number [id number]: [id number] was used        Kavita Cummings  01/07/21  8:00 AM

## 2021-01-07 NOTE — PROGRESS NOTES
Please refer to the [facility] ultrasound report in Ob Procedures for additional information regarding today's visit

## 2021-01-07 NOTE — LETTER
January 7, 2021     Quinten Arevalo MD  [address] [address]    Patient: Kala Yang   YOB: 1995   Date of Visit: 1/7/2021       Dear Dr Adela Scott:    Thank you for referring Topher Echavarria to me for evaluation  Below are my notes for this consultation  If you have questions, please do not hesitate to call me  I look forward to following your patient along with you  Sincerely,        Nessa Post MD        CC: No Recipients  Nessa Post MD  1/7/2021  8:16 AM  Sign when Signing Visit  Please refer to the [facility] ultrasound report in Ob Procedures for additional information regarding today's visit

## 2021-01-08 DIAGNOSIS — R73.09 ABNORMAL GLUCOSE: Primary | ICD-10-CM

## 2021-01-15 LAB
# FETUSES US: 1
AFP ADJ MOM SERPL: 1.17
AFP SERPL-MCNC: 67.6 NG/ML
B-HCG ADJ MOM SERPL: 1.84
B-HCG SERPL-ACNC: 29.3 IU/ML
COLLECT DATE: NORMAL
CURRENT SMOKER: NO
FET CRL US.MEAS: 70 MM
FET NUCHAL FOLD MOM THICKNESS US.MEAS: 1.41
FET NUCHAL FOLD THICKNESS US.MEAS: 2.3 MM
FET TS 21 RISK FROM MAT AGE: NORMAL
GA CLIN EST: 21.1 WK
HX OF NTD NARR: NORMAL
IDDM PATIENT QL: NORMAL
INHIBIN A ADJ MOM SERPL: 1.89
INHIBIN A SERPL-MCNC: 358 PG/ML
INTEGRATED SCN PATIENT-IMP: NORMAL
NEURAL TUBE DEFECT RISK FETUS: NORMAL %
PAPP-A MOM SERPL: 0.92
PAPP-A SERPL-MCNC: 721.5 NG/ML
PHYSICIAN NPI: NORMAL
SL AMB NASAL BONE: PRESENT
SL AMB REFERRING PHYSICIAN NAME: NORMAL
SL AMB REFERRING PHYSICIAN PHONE: NORMAL
SL AMB REPEAT SPECIMEN: NORMAL
SL AMB SPECIMEN # FROM PART 1: NORMAL
SL AMB TWIN B NASAL BONE: NORMAL
TS 18 RISK FETUS: NORMAL
TS 21 RISK FETUS: NORMAL
U ESTRIOL ADJ MOM SERPL: 1.12
U ESTRIOL SERPL-MCNC: 2.65 NG/ML
US DATE: NORMAL

## 2021-01-18 ENCOUNTER — LAB (OUTPATIENT)
Dept: LAB | Facility: HOSPITAL | Age: 26
End: 2021-01-18
Attending: OBSTETRICS & GYNECOLOGY
Payer: COMMERCIAL

## 2021-01-18 DIAGNOSIS — R73.09 ABNORMAL GLUCOSE: ICD-10-CM

## 2021-01-18 DIAGNOSIS — R73.09 ABNORMAL GLUCOSE: Primary | ICD-10-CM

## 2021-01-18 LAB
GLUCOSE 1H P 100 G GLC PO SERPL-MCNC: 131 MG/DL (ref 65–179)
GLUCOSE P FAST SERPL-MCNC: 89 MG/DL (ref 65–99)

## 2021-01-18 PROCEDURE — 36415 COLL VENOUS BLD VENIPUNCTURE: CPT

## 2021-01-18 PROCEDURE — 82951 GLUCOSE TOLERANCE TEST (GTT): CPT

## 2021-01-18 NOTE — PROGRESS NOTES
The patient was unable to have her 3 hour gtt test done today due to getting sick  A new script was put in her chart for her to go again another day

## 2021-01-20 ENCOUNTER — TELEPHONE (OUTPATIENT)
Dept: PERINATAL CARE | Facility: CLINIC | Age: 26
End: 2021-01-20

## 2021-01-20 NOTE — TELEPHONE ENCOUNTER
----- Message from Bhavik Brink MD sent at 2021  2:22 PM EST -----  Hi  RN staff, I've reviewed this Sequential Part 2 result which shows low residual risk for the conditions tested (trisomies 21 and 18 and open neural tube defects), can you call her to inform her of this result? Thank you    Bhavik Brink MD

## 2021-01-20 NOTE — TELEPHONE ENCOUNTER
Left VMM to patient to call M nurse line if any questions on Stepwise Part 2 result  [facility]

## 2021-01-21 ENCOUNTER — TELEPHONE (OUTPATIENT)
Dept: OBGYN CLINIC | Facility: MEDICAL CENTER | Age: 26
End: 2021-01-21

## 2021-01-21 NOTE — TELEPHONE ENCOUNTER
Pt called office back and would like to inform nurse that she is going to repeat the 3 hour gtt  Please review

## 2021-01-21 NOTE — TELEPHONE ENCOUNTER
States she went for 3hour GTT yesterday and felt ill in middle of test, was unable to complete    Options reviewed in detail with patient Including, repeating test, logging  2hour PP and FBS for 1 week, or referral to diabetes in pregnancy program   Is undecided and states she will call back

## 2021-01-21 NOTE — TELEPHONE ENCOUNTER
Pt called in and requested to speak with someone as she has some questions regarding her glucose testing  She stated that she almost passed out during her 3 hour gtt and was wondering if she could discuss her options  Please call pt and review

## 2021-01-24 ENCOUNTER — LAB (OUTPATIENT)
Dept: LAB | Facility: HOSPITAL | Age: 26
End: 2021-01-24
Attending: OBSTETRICS & GYNECOLOGY
Payer: COMMERCIAL

## 2021-01-24 DIAGNOSIS — R73.09 ABNORMAL GLUCOSE: ICD-10-CM

## 2021-01-24 LAB
GLUCOSE 1H P 100 G GLC PO SERPL-MCNC: 184 MG/DL (ref 65–179)
GLUCOSE 2H P 100 G GLC PO SERPL-MCNC: 175 MG/DL (ref 65–154)
GLUCOSE 3H P 100 G GLC PO SERPL-MCNC: 134 MG/DL (ref 65–139)
GLUCOSE P FAST SERPL-MCNC: 87 MG/DL (ref 65–99)

## 2021-01-24 PROCEDURE — 82951 GLUCOSE TOLERANCE TEST (GTT): CPT

## 2021-01-24 PROCEDURE — 82952 GTT-ADDED SAMPLES: CPT

## 2021-01-24 PROCEDURE — 36415 COLL VENOUS BLD VENIPUNCTURE: CPT

## 2021-01-27 DIAGNOSIS — O24.410 DIET CONTROLLED GESTATIONAL DIABETES MELLITUS (GDM) IN THIRD TRIMESTER: Primary | ICD-10-CM

## 2021-02-04 ENCOUNTER — ROUTINE PRENATAL (OUTPATIENT)
Dept: OBGYN CLINIC | Facility: MEDICAL CENTER | Age: 26
End: 2021-02-04

## 2021-02-04 VITALS — BODY MASS INDEX: 38.61 KG/M2 | WEIGHT: 232 LBS | DIASTOLIC BLOOD PRESSURE: 70 MMHG | SYSTOLIC BLOOD PRESSURE: 120 MMHG

## 2021-02-04 DIAGNOSIS — Z3A.24 24 WEEKS GESTATION OF PREGNANCY: Primary | ICD-10-CM

## 2021-02-04 DIAGNOSIS — O99.212 MATERNAL OBESITY, ANTEPARTUM, SECOND TRIMESTER: ICD-10-CM

## 2021-02-04 DIAGNOSIS — Z34.02 ENCOUNTER FOR SUPERVISION OF NORMAL FIRST PREGNANCY IN SECOND TRIMESTER: ICD-10-CM

## 2021-02-04 DIAGNOSIS — O24.410 DIET CONTROLLED WHITE CLASSIFICATION A1 GESTATIONAL DIABETES MELLITUS (GDM): ICD-10-CM

## 2021-02-04 PROCEDURE — PNV: Performed by: OBSTETRICS & GYNECOLOGY

## 2021-02-04 NOTE — PROGRESS NOTES
Assessment  23 y o  Samira Rojas at 24w3d presenting for routine prenatal visit  Plan  Diagnoses and all orders for this visit:    24 weeks gestation of pregnancy  Encounter for supervision of normal first pregnancy in second trimester  -  labor precautions  - Normal movement discussed  - Level 2 reviewed  - Return in 4wks for PN    Diet controlled White classification A1 gestational diabetes mellitus (GDM)  Maternal obesity, antepartum, second trimester  - Follows with MFM  - Continue glucose checking and reporting  - Has upcoming growth US at 28wks        ____________________________________________________________        Vitaliy Males is a 23 y o  Samira Rojas at 24w3d who presents for routine prenatal visit  She is without complaint  She denies contractions, loss of fluid, or vaginal bleeding  She feels regular fetal movements  Pregnancy Problems:  Patient Active Problem List   Diagnosis    Fatigue    Rash    Vitamin D deficiency    Obesity in pregnancy    Encounter for supervision of normal first pregnancy in second trimester    Maternal obesity, antepartum, second trimester         Objective  /70   Wt 105 kg (232 lb)   LMP 2020   BMI 38 61 kg/m²     FHT: 143 BPM   Uterine Size: size equals dates     Physical Exam:  Physical Exam  Constitutional:       General: She is not in acute distress  Appearance: Normal appearance  She is well-developed  She is not ill-appearing, toxic-appearing or diaphoretic  HENT:      Head: Normocephalic and atraumatic  Eyes:      General: No scleral icterus  Pulmonary:      Effort: Pulmonary effort is normal  No accessory muscle usage or respiratory distress  Abdominal:      General: There is distension (gravid)  Tenderness: There is no abdominal tenderness  There is no guarding or rebound  Skin:     General: Skin is warm and dry  Findings: No erythema or rash  Neurological:      Mental Status: She is alert  Psychiatric:         Behavior: Behavior normal

## 2021-02-08 ENCOUNTER — TELEMEDICINE (OUTPATIENT)
Dept: PERINATAL CARE | Facility: CLINIC | Age: 26
End: 2021-02-08
Payer: COMMERCIAL

## 2021-02-08 DIAGNOSIS — Z3A.25 25 WEEKS GESTATION OF PREGNANCY: ICD-10-CM

## 2021-02-08 DIAGNOSIS — O24.410 DIET CONTROLLED GESTATIONAL DIABETES MELLITUS (GDM) IN THIRD TRIMESTER: Primary | ICD-10-CM

## 2021-02-08 PROCEDURE — G0109 DIAB MANAGE TRN IND/GROUP: HCPCS | Performed by: DIETITIAN, REGISTERED

## 2021-02-08 RX ORDER — BLOOD-GLUCOSE METER
EACH MISCELLANEOUS
Qty: 1 KIT | Refills: 0 | Status: SHIPPED | OUTPATIENT
Start: 2021-02-08 | End: 2022-06-20

## 2021-02-08 RX ORDER — LANCETS 33 GAUGE
EACH MISCELLANEOUS
Qty: 100 EACH | Refills: 3 | Status: SHIPPED | OUTPATIENT
Start: 2021-02-08 | End: 2021-02-16 | Stop reason: SDUPTHER

## 2021-02-08 RX ORDER — BLOOD SUGAR DIAGNOSTIC
STRIP MISCELLANEOUS
Qty: 100 EACH | Refills: 3 | Status: SHIPPED | OUTPATIENT
Start: 2021-02-08 | End: 2021-02-16 | Stop reason: SDUPTHER

## 2021-02-08 NOTE — PROGRESS NOTES
Thank you for referring your patient to [facility] Maternal Fetal Medicine Diabetes in Pregnancy Program      Lang Ross is a  23 y o  female who presents today for Initial visit (class 1)  Patient is at 25w0d gestation, Estimated Date of Delivery: 21  Reviewed and updated the following from patients medical record: PMH, Problem List, Allergies, and Current Medications  Visit Diagnosis:  GDM in pregnancy method of control unspecified    Discussed with patient what GDM is, pathophysiology of GDM, untreated GDM and maternal fetal complications including fetal macrosomia,  hypoglycemia, polyhydramnios, increased incidence of  section,  labor, and in severe cases fetal demise and still birth   Discussed importance of blood glucose monitoring, nutrition, and medication if necessary in achieving BG goals  Discussed resumption of non-diabetic state post delivery, but reviewed increased risk of Type 2 DM later in life and ways to reduce risk by maintaining a healthy weight and eating habits  PMH/PSH:  Past Medical History:   Diagnosis Date    Anxiety     Asthma     as a child    Depression     Urinary tract infection      No past surgical history on file      Labs:  No components found for: [employer] [place]  Lab Results   Component Value Date     2020     Lab Results   Component Value Date    [id number] 175 (H) 2021       () 3-Hr GTT:   Fastin  1 hr GTT: 184  2 hr GTT: 175  3 hr GTT: 134      Medications:  Current Outpatient Medications on File Prior to Visit   Medication Sig Dispense Refill    aspirin (ECOTRIN LOW STRENGTH) 81 mg EC tablet Take 81 mg by mouth daily      BENZOYL PEROXIDE 5 % external wash WASH THIGHS DAILY      cholecalciferol (VITAMIN D3) 1,000 units tablet Take 2 tablets (2,000 Units total) by mouth daily 180 tablet 3    clindamycin (CLEOCIN T) 1 % external solution APPLY TO THIGHS TWICE A DAY      doxycycline hyclate (VIBRAMYCIN) 100 mg capsule take 1 capsule by mouth twice a day with food for 2 weeks      ergocalciferol (VITAMIN D2) 50,000 units Take 1 capsule (50,000 Units total) by mouth once a week (Patient not taking: Reported on 1/7/2021) 12 capsule 0    hydrocortisone 0 5 % cream Apply topically 2 (two) times a day (Patient not taking: Reported on 6/27/2020) 30 g 1    hydrocortisone 2 5 % cream APPLY TO AFFECTED AREA ON FACE TWICE A DAY FOR 3-4 DAYS A WEEK AS     (REFER TO PRESCRIPTION NOTES)   ketoconazole (NIZORAL) 2 % shampoo SHAMPOO SCALP/FACE 2-3 TIMES A WEEK AS NEEDED FOR REDNESS/ITCHING/FLAKING      Prenatal Vit-Fe Fumarate-FA (PRENATAL 19 PO) Take 1 tablet by mouth daily       No current facility-administered medications on file prior to visit  Ordered Onetouch verio flex meter and testing supplies      Recent Ultrasound Report:   DATE:1/7/21  Fetal Growth: Normal  RUTH: Normal    Anthropometrics:  Ht Readings from Last 3 Encounters:   01/07/21 5' 5" (1 651 m)   01/07/21 5' 5" (1 651 m)   11/16/20 5' 5" (1 651 m)     Wt Readings from Last 3 Encounters:   02/04/21 105 kg (232 lb)   01/07/21 103 kg (227 lb)   01/07/21 103 kg (227 lb)     Pre-gravid weight: 97 5 kg (215 lb)  Pre-gravid BMI: 35 78  Weight Change: 17 lb weight gain  Weight gain recommendations: BMI (> 30) 11-20 lbs    Blood Glucose Monitoring:   Glucose Meter: OneTouch Verio Flex  Instructed on testing blood sugars: 4 x per day (Fasting, 2 hour after start of each meal)  Blood sugar reading during demo: Did not have meter orders prior to appointment therefore did not have meter to perform a test    Gave instruction on site selection, skin preparation, loading strips and lancet device, meter activation, obtaining blood sample, test strip and lancet disposal and storage, and recording log book entries  Patient has good understanding of material covered and was able to test their own blood sugar in office today       Instruction for reporting blood sugar results weekly via:   Phone: [phone]   Fax: [phone]   My Chart (Message, or Glucose Flowsheet)    Goal Blood Sugar Ranges:    Fastin-90 mg/dL   1 hour after the start of each meal: 135 mg/dL or <   2 hours after start of each meal: 120 mg/dL or <      Meal Plan (daily calorie and protein needs):  Calories: 2200 calorie (CHO:88-27-97-30-60-30) (PRO: 3-2-3/4-2-3/4-2)  Protein:115 gm/day    Diet Instruction:  1  Patient was provided with a meal plan including 3 meals and 3 snacks  2  Discussed appropriate amounts of CHO, PRO, and Fat at each meal and snack  3  Reviewed CHO exchange list, and portion sizes for both CHO and PRO via food models  4  Instruction on how to read a food label  5  Provided suggested meal/snack options to increase nutrition and maintain consistent meal and snack intakes  6  Instructed on how to keep a 3-day food diary to be brought to follow- up appointment  7  Encouraged  patient to eat every 2 0-3 5 hours while awake  8  Encouraged patient to go no longer than 8-10 hours fasting overnight until first meal of the day  Physical Activity:  Discussed benefits of physical activity to optimize blood glucose control, encouraged activity at patient is physically able  Always consult a physician prior to starting an exercise program  Recommend 20-30 minutes daily  Do you exercise? unknown    Nutrition Diagnosis Statement:  Nutrition Diagnosis: Altered nutrition related lab values (glucose)  Related to: GDM  As evidenced by: elevated 1 hr and 3 hr GTT    Goals:  1  Fasting BG 60-90 mg/dL  2  2 hr PP Blood Glucose <120 mg/dL (1 hr PP <135)  3  GDM Diet     Monitoring and Evaluation  1  Adherence to GDM diet  2  Blood Glucose Monitoring   3  Weight/ Body Composition      Patient Stated Goal: unknown    Diabetes Self Management Support Plan outside of ongoing care:  Other unknown    Learner/s Present:Learners Present: Patient   Educational Materials Provided: Diabetes in Pregnancy Booklet, Meal Plan 2200 calorie, 3-day Food Log and Self Assessment Form  Teaching methods used: Teaching Methods MFM: Listened to Instruction, Visualized Demonstration, Food Models and Meter Teaching/Demo  Patients Response to Education Information Provided: Voices Understanding  Readiness to Change: Preparation (Preparing to make changes)  Barriers to Learning/Change: no barriers  Expected Compliance: good    Date to report blood sugars: Sunday, 2/14/21 prior to class 2  Class 2 (date): Monday, 2/15/21    Begin Time: 1:00 pm  End Time: 2:20 pm    It was a pleasure working with them today  Please feel free to call with any questions or concerns  Saman Early RD, LDN  Diabetes Educator  [facility] Maternal Fetal Medicine  Diabetes in Pregnancy Program  [address] [place] [address] 54, [address] [facility]  Virtual Regular Visit      Assessment/Plan:    Problem List Items Addressed This Visit     None      Visit Diagnoses     Diet controlled gestational diabetes mellitus (GDM) in third trimester    -  Primary    25 weeks gestation of pregnancy                   Reason for visit is   Chief Complaint   Patient presents with    Virtual Regular Visit        Encounter provider Saman Early    Provider located at [address] [place] [id number] [phone]      Recent Visits  No visits were found meeting these conditions  Showing recent visits within past 7 days and meeting all other requirements     Today's Visits  Date Type Provider Dept   02/08/21 Telemedicine [facility]   Showing today's visits and meeting all other requirements     Future Appointments  No visits were found meeting these conditions  Showing future appointments within next 150 days and meeting all other requirements        The patient was identified by name and date of birth   Eva Ramírez was informed that this is a telemedicine visit and that the visit is being conducted through [employer] and patient was informed that this is a secure, HIPAA-compliant platform  She agrees to proceed     My office door was closed  No one else was in the room  She acknowledged consent and understanding of privacy and security of the video platform  The patient has agreed to participate and understands they can discontinue the visit at any time  Patient is aware this is a billable service  Mohini Aguilera is a 23 y o  female    HPI     Past Medical History:   Diagnosis Date    Anxiety     Asthma     as a child    Depression     Urinary tract infection        No past surgical history on file  Current Outpatient Medications   Medication Sig Dispense Refill    aspirin (ECOTRIN LOW STRENGTH) 81 mg EC tablet Take 81 mg by mouth daily      BENZOYL PEROXIDE 5 % external wash WASH THIGHS DAILY      cholecalciferol (VITAMIN D3) 1,000 units tablet Take 2 tablets (2,000 Units total) by mouth daily 180 tablet 3    clindamycin (CLEOCIN T) 1 % external solution APPLY TO THIGHS TWICE A DAY      doxycycline hyclate (VIBRAMYCIN) 100 mg capsule take 1 capsule by mouth twice a day with food for 2 weeks      ergocalciferol (VITAMIN D2) 50,000 units Take 1 capsule (50,000 Units total) by mouth once a week (Patient not taking: Reported on 1/7/2021) 12 capsule 0    hydrocortisone 0 5 % cream Apply topically 2 (two) times a day (Patient not taking: Reported on 6/27/2020) 30 g 1    hydrocortisone 2 5 % cream APPLY TO AFFECTED AREA ON FACE TWICE A DAY FOR 3-4 DAYS A WEEK AS     (REFER TO PRESCRIPTION NOTES)   ketoconazole (NIZORAL) 2 % shampoo SHAMPOO SCALP/FACE 2-3 TIMES A WEEK AS NEEDED FOR REDNESS/ITCHING/FLAKING      Prenatal Vit-Fe Fumarate-FA (PRENATAL 19 PO) Take 1 tablet by mouth daily       No current facility-administered medications for this visit           No Known Allergies    Review of Systems    Video Exam    There were no vitals filed for this visit  Physical Exam     I spent 90 minutes minutes directly with the patient during this visit      [address] acknowledges that she has consented to an online visit or consultation  She understands that the online visit is based solely on information provided by her, and that, in the absence of a face-to-face physical evaluation by the physician, the diagnosis she receives is both limited and provisional in terms of accuracy and completeness  This is not intended to replace a full medical face-to-face evaluation by the physician  Tucker Gonzales understands and accepts these terms

## 2021-02-08 NOTE — PATIENT INSTRUCTIONS
1  Continue Meal Plan consisting of 3 meals and 3 snacks daily, including protein at each  2  Try to eat every 2-3 5 hours while awake  3  Do not exceed 8-10 hours fasting overnight  4  Continue self-monitoring blood glucose: 4 x per day (Fasting, 2 hour after start of each meal)  Goal: fasting glucose 90 mg/dL or less, and 2 hrs after the start of each meal 120 mg/dL or less (1 hr after the start of each meal 135 mg/dL or less)  5  Submit blood sugar values weekly via: Telephone  at [phone] or [employer] messaging or [employer] glucose flowsheet  6  If no restriction from physician, recommend up to 30 minutes walking daily  7  Report blood sugars on Sunday, 2/14/21 prior to class 2  8  Complete 3 day food log and self assessment sheet and send in via [employer] message as image attachment to [facility] provider prior to class 2 appointment     9  Class 2 follow up appointment scheduled for Monday, 2/15/21

## 2021-02-15 ENCOUNTER — TELEMEDICINE (OUTPATIENT)
Dept: PERINATAL CARE | Facility: CLINIC | Age: 26
End: 2021-02-15
Payer: COMMERCIAL

## 2021-02-15 DIAGNOSIS — O24.410 DIET CONTROLLED GESTATIONAL DIABETES MELLITUS (GDM) IN THIRD TRIMESTER: Primary | ICD-10-CM

## 2021-02-15 DIAGNOSIS — Z3A.26 26 WEEKS GESTATION OF PREGNANCY: ICD-10-CM

## 2021-02-15 PROCEDURE — G0109 DIAB MANAGE TRN IND/GROUP: HCPCS | Performed by: DIETITIAN, REGISTERED

## 2021-02-15 RX ORDER — BLOOD-GLUCOSE METER
EACH MISCELLANEOUS
Qty: 1 KIT | Refills: 0 | Status: SHIPPED | OUTPATIENT
Start: 2021-02-15 | End: 2022-06-20

## 2021-02-15 NOTE — PROGRESS NOTES
Thank you for referring your patient to [facility] Maternal Fetal Medicine Diabetes in Pregnancy Program      Tucker Gonzales is a  23 y o  female who presents today for class 2  Patient is at 26w0d gestation, Estimated Date of Delivery: 21  Reviewed and updated the following from patients medical record: PMH, Problem List, Allergies, and Current Medications  Visit Diagnosis:  GDM in pregnancy method of control unspecified      PMH/PSH:  Past Medical History:   Diagnosis Date    Anxiety     Asthma     as a child    Depression     Urinary tract infection      No past surgical history on file      Labs:  No components found for: [employer] [place]  Lab Results   Component Value Date     2020     Lab Results   Component Value Date    [id number] 175 (H) 2021       () 3-Hr GTT:   Fastin  1 hr GTT: 184  2 hr GTT: 175  3 hr GTT: 134      Medications:  Current Outpatient Medications on File Prior to Visit   Medication Sig Dispense Refill    aspirin (ECOTRIN LOW STRENGTH) 81 mg EC tablet Take 81 mg by mouth daily      BENZOYL PEROXIDE 5 % external wash WASH THIGHS DAILY      Blood Glucose Monitoring Suppl (OneTouch Verio) w/Device KIT Use daily to test blood sugar 4 times per day, fasting and 2 hours after the start of each meal  1 kit 0    cholecalciferol (VITAMIN D3) 1,000 units tablet Take 2 tablets (2,000 Units total) by mouth daily 180 tablet 3    clindamycin (CLEOCIN T) 1 % external solution APPLY TO THIGHS TWICE A DAY      doxycycline hyclate (VIBRAMYCIN) 100 mg capsule take 1 capsule by mouth twice a day with food for 2 weeks      ergocalciferol (VITAMIN D2) 50,000 units Take 1 capsule (50,000 Units total) by mouth once a week (Patient not taking: Reported on 2021) 12 capsule 0    hydrocortisone 0 5 % cream Apply topically 2 (two) times a day (Patient not taking: Reported on 2020) 30 g 1    hydrocortisone 2 5 % cream APPLY TO AFFECTED AREA ON FACE TWICE A DAY FOR 3-4 DAYS A WEEK AS     (REFER TO PRESCRIPTION NOTES)   ketoconazole (NIZORAL) 2 % shampoo SHAMPOO SCALP/FACE 2-3 TIMES A WEEK AS NEEDED FOR REDNESS/ITCHING/FLAKING      OneTouch Delica Lancets 75Q MISC Test blood Sugar 4 times per day, fasting and 2 hours after the start of each meal  100 each 3    OneTouch Verio test strip Test blood Sugar 4 times per day, fasting and 2 hours after the start of each meal  100 each 3    Prenatal Vit-Fe Fumarate-FA (PRENATAL 19 PO) Take 1 tablet by mouth daily       No current facility-administered medications on file prior to visit  Ordered Onetouch verio flex meter and testing supplies  There was an issue with meter kit order per patient email, re-ordered correct meter (one touch verio flex) and sent script to pharmacy  Recent Ultrasound Report:  DATE:21  Fetal Growth: Normal  RUTH: Normal    Anthropometrics:  Ht Readings from Last 3 Encounters:   21 5' 5" (1 651 m)   21 5' 5" (1 651 m)   20 5' 5" (1 651 m)     Wt Readings from Last 3 Encounters:   21 105 kg (232 lb)   21 103 kg (227 lb)   21 103 kg (227 lb)     Pre-gravid weight: 97 5 kg (215 lb)  Pre-gravid BMI: 35 78  Weight Change: 17 lb weight gain  Weight gain recommendations: BMI (> 30) 11-20 lbs    Blood Glucose Monitoring:   Glucose Meter: OneTouch Verio Flex Note: Patient has still not picked up meter as of yet therefore has not tested and did not have any blood sugars to share during appointment today    Instructed on testing blood sugars: 4 x per day (Fasting, 2 hour after start of each meal)    Instruction for reporting blood sugar results weekly via:   Phone: [phone]   Fax: [phone]   My Chart (Message, or Glucose Flowsheet)    Goal Blood Sugar Ranges:    Fastin-90 mg/dL   1 hour after the start of each meal: 135 mg/dL or <   2 hours after start of each meal: 120 mg/dL or <      Meal Plan (daily calorie and protein needs):  Calories: 2200 calorie (CHO:90-74-89-30-60-30) (PRO: 3-2-3/4-2-3/4-2)  Protein:115 gm/day    24 hr Recall:  Note: patient stated she kept a food diary but has not yet sent in via [employer], will send full food log later this afternoon when she gets home  B: 8 am 2 slices [employer] toast, 2 Tbsp peanut butter, and glass of milk  S: 11 am: light and fit protein greek yogurt with string cheese  L; 9:31 pm 2 slices [employer] bread, 2 slices ham, 1 slice cheese, and piece of fruit  S: 4:30 pm  D: 6-6:30 pm: 1 cup spaghetti, with 1/2 cup meat sauce and 16 grapes  S: 8:00 pm    Diet Instruction:  1  Patient was provided with a meal plan including 3 meals and 3 snacks  2  Discussed appropriate amounts of CHO, PRO, and Fat at each meal and snack  3  Reviewed CHO exchange list, and portion sizes for both CHO and PRO via food models  4  Instruction on how to read a food label  5  Provided suggested meal/snack options to increase nutrition and maintain consistent meal and snack intakes  6  Instructed on how to keep a 3-day food diary to be brought to follow- up appointment  7  Encouraged  patient to eat every 2 0-3 5 hours while awake  8  Encouraged patient to go no longer than 8-10 hours fasting overnight until first meal of the day  Physical Activity:  Discussed benefits of physical activity to optimize blood glucose control, encouraged activity at patient is physically able  Always consult a physician prior to starting an exercise program  Recommend 20-30 minutes daily  Do you exercise? unknown    Nutrition Diagnosis Statement:  Nutrition Diagnosis: Altered nutrition related lab values (glucose)  Related to: GDM  As evidenced by: elevated 1 hr and 3 hr GTT    Goals:  1  Fasting BG 60-90 mg/dL  2  2 hr PP Blood Glucose <120 mg/dL (1 hr PP <135)  3  GDM Diet     Monitoring and Evaluation  1  Adherence to GDM diet  2  Blood Glucose Monitoring   3   Weight/ Body Composition      Patient Stated Goal: unknown    Diabetes Self Management Support Plan outside of ongoing care: Other unknown    Learner/s Present:Learners Present: Patient   Educational Materials Provided: Diabetes in Pregnancy Booklet, Meal Plan 2200 calorie, 3-day Food Log and Self Assessment Form  Teaching methods used: Teaching Methods MFM: Listened to Instruction, Visualized Demonstration, Food Models and Meter Teaching/Demo  Patients Response to Education Information Provided: Voices Understanding  Readiness to Change: Preparation (Preparing to make changes)  Barriers to Learning/Change: no barriers  Expected Compliance: good    Date to report blood sugars: Monday, 2/22/21  F/U: 4 weeks    Begin Time: 1:00 pm  End Time: 2:20 pm    It was a pleasure working with them today  Please feel free to call with any questions or concerns  Andrena Peabody RD, LDN  Diabetes Educator  [facility] Maternal Fetal Medicine  Diabetes in Pregnancy Program  [address] [place] [address] 54, 210 [facility]    Virtual Regular Visit      Assessment/Plan:    Problem List Items Addressed This Visit     None               Reason for visit is   Chief Complaint   Patient presents with    Virtual Regular Visit        Encounter provider Andrena Peabody    Provider located at [address] [place] [id number] [phone]      Recent Visits  Date Type Provider Dept   02/08/21 Aleishaapril [address]   Showing recent visits within past 7 days and meeting all other requirements     Future Appointments  No visits were found meeting these conditions  Showing future appointments within next 150 days and meeting all other requirements        The patient was identified by name and date of birth  Aleisha Qureshi was informed that this is a telemedicine visit and that the visit is being conducted through [employer] and patient was informed that this is a secure, HIPAA-compliant platform  She agrees to proceed     My office door was closed  No one else was in the room  She acknowledged consent and understanding of privacy and security of the video platform  The patient has agreed to participate and understands they can discontinue the visit at any time  Patient is aware this is a billable service  Iram Guido is a 23 y o  female    HPI     Past Medical History:   Diagnosis Date    Anxiety     Asthma     as a child    Depression     Urinary tract infection        No past surgical history on file  Current Outpatient Medications   Medication Sig Dispense Refill    aspirin (ECOTRIN LOW STRENGTH) 81 mg EC tablet Take 81 mg by mouth daily      BENZOYL PEROXIDE 5 % external wash WASH THIGHS DAILY      Blood Glucose Monitoring Suppl (OneTouch Verio Flex System) w/Device KIT Test 4 times daily  Gestational Diabetes  1 kit 0    Blood Glucose Monitoring Suppl (OneTouch Verio) w/Device KIT Use daily to test blood sugar 4 times per day, fasting and 2 hours after the start of each meal  1 kit 0    cholecalciferol (VITAMIN D3) 1,000 units tablet Take 2 tablets (2,000 Units total) by mouth daily 180 tablet 3    clindamycin (CLEOCIN T) 1 % external solution APPLY TO THIGHS TWICE A DAY      doxycycline hyclate (VIBRAMYCIN) 100 mg capsule take 1 capsule by mouth twice a day with food for 2 weeks      ergocalciferol (VITAMIN D2) 50,000 units Take 1 capsule (50,000 Units total) by mouth once a week (Patient not taking: Reported on 1/7/2021) 12 capsule 0    hydrocortisone 0 5 % cream Apply topically 2 (two) times a day (Patient not taking: Reported on 6/27/2020) 30 g 1    hydrocortisone 2 5 % cream APPLY TO AFFECTED AREA ON FACE TWICE A DAY FOR 3-4 DAYS A WEEK AS     (REFER TO PRESCRIPTION NOTES)        ketoconazole (NIZORAL) 2 % shampoo SHAMPOO SCALP/FACE 2-3 TIMES A WEEK AS NEEDED FOR REDNESS/ITCHING/FLAKING      OneTouch Delica Lancets 81I MISC Test blood Sugar 4 times per day, fasting and 2 hours after the start of each meal  100 each 3    OneTouch Verio test strip Test blood Sugar 4 times per day, fasting and 2 hours after the start of each meal  100 each 3    Prenatal Vit-Fe Fumarate-FA (PRENATAL 19 PO) Take 1 tablet by mouth daily       No current facility-administered medications for this visit  No Known Allergies    Review of Systems    Video Exam    There were no vitals filed for this visit  Physical Exam     I spent 60 minutes directly with the patient during this visit      [address] [facility] acknowledges that she has consented to an online visit or consultation  She understands that the online visit is based solely on information provided by her, and that, in the absence of a face-to-face physical evaluation by the physician, the diagnosis she receives is both limited and provisional in terms of accuracy and completeness  This is not intended to replace a full medical face-to-face evaluation by the physician  Shasha Joyner understands and accepts these terms

## 2021-02-15 NOTE — TELEPHONE ENCOUNTER
Patient sent email message stating she was unable to  meter and testing supplies due to insurance needing a prior auth  Called [facility] pharmacy on file to discuss and order for strips and lancets filled awaiting , but request for meter to be re-ordered  Re-ordered meter device today  Called patient to inform, no answer left message  Patient has class 2 with me later this afternoon

## 2021-02-15 NOTE — PATIENT INSTRUCTIONS
1  Continue Meal Plan consisting of 3 meals and 3 snacks daily, including protein at each  2  Try to eat every 2-3 5 hours while awake  3  Do not exceed 8-10 hours fasting overnight  4  Continue self-monitoring blood glucose: 4 x per day (Fasting, 2 hour after start of each meal)  Goal: fasting glucose 90 mg/dL or less, and 2 hrs after the start of each meal 120 mg/dL or less (1 hr after the start of each meal 135 mg/dL or less)  5  Submit blood sugar values weekly via: Telephone  at [phone] or [employer] messaging or [employer] glucose flowsheet  6  If no restriction from physician, recommend up to 30 minutes walking daily  7     Report blood sugars on Monday, 2/22/21

## 2021-02-16 ENCOUNTER — TELEPHONE (OUTPATIENT)
Dept: PERINATAL CARE | Facility: OTHER | Age: 26
End: 2021-02-16

## 2021-02-16 DIAGNOSIS — O24.410 DIET CONTROLLED GESTATIONAL DIABETES MELLITUS (GDM) IN THIRD TRIMESTER: ICD-10-CM

## 2021-02-16 RX ORDER — BLOOD SUGAR DIAGNOSTIC
STRIP MISCELLANEOUS
Qty: 100 EACH | Refills: 3 | Status: SHIPPED | OUTPATIENT
Start: 2021-02-16 | End: 2021-03-30 | Stop reason: SDUPTHER

## 2021-02-16 RX ORDER — LANCETS 33 GAUGE
EACH MISCELLANEOUS
Qty: 100 EACH | Refills: 3 | Status: SHIPPED | OUTPATIENT
Start: 2021-02-16 | End: 2021-03-30 | Stop reason: SDUPTHER

## 2021-02-23 ENCOUNTER — DOCUMENTATION (OUTPATIENT)
Dept: PERINATAL CARE | Facility: CLINIC | Age: 26
End: 2021-02-23

## 2021-02-23 NOTE — PROGRESS NOTES
[facility] [employer]     Date:  21  RE: Tucker Gonzales    : 1995  Estimated Date of Delivery: 21  EGA: 27w3d  OB/GYN: [facility]      Date Fasting Post-  breakfast Post-  lunch Post-  dinner Before bedtime Carbs Comments                                                                             Current regimen:  Patient completed class 2 on 2/15/21 with Corrinne Bushman, Diabetes Educator  Patient has not yet reported blood sugars  Food diary was sent via [employer] message  2200 calorie GDM meal plan; including a combination of carbohydrate, protein and fat  SMBG 4 times per day; fasting and 2 hrs pp with a OneTouch Verio blood glucose meter  Plan:  Attempted to contact patient by phone today to discuss blood sugar report  Voicemail message left requesting patient report blood sugars via [employer]  Also, sent a [employer] message to patient requesting bg report  SMBG 4 x per day (Fasting, 2 hour after start of each meal) using OneTouch Verio Flex glucose meter  Continue meal plan consisting of 3 meals and 3 snacks, including protein at each  Overall patient is doing well with diet  Advised to add cheese or nuts when having crackers and humus as a snack  If okay by OB, walk 20-30 minutes daily  21 US: fetal growth and RUTH appeared normal  Next US is scheduled for 3/4  Noted follow up dsmt appointment with [facility] for 3/15  Requested patient call MFM at [phone] if there are any questions reporting blood sugars and if scheduling follow up sooner would be helpful?     Diabetes Self Management Support Plan outside of ongoing care: Spouse/Family    Date due to report next:  Report blood sugars today and weekly by [address], RD,LDN,CDE  Diabetes Educator   Diabetes and Pregnancy Program

## 2021-02-24 ENCOUNTER — DOCUMENTATION (OUTPATIENT)
Dept: PERINATAL CARE | Facility: CLINIC | Age: 26
End: 2021-02-24

## 2021-03-03 PROBLEM — O99.213 MATERNAL OBESITY, ANTEPARTUM, THIRD TRIMESTER: Status: ACTIVE | Noted: 2021-03-03

## 2021-03-03 PROBLEM — O24.410 DIET CONTROLLED GESTATIONAL DIABETES MELLITUS (GDM) IN THIRD TRIMESTER: Status: ACTIVE | Noted: 2021-03-03

## 2021-03-03 NOTE — PROGRESS NOTES
Please refer to the [facility] ultrasound report in Ob Procedures for additional information regarding today's visit

## 2021-03-04 ENCOUNTER — ULTRASOUND (OUTPATIENT)
Dept: PERINATAL CARE | Facility: OTHER | Age: 26
End: 2021-03-04
Payer: COMMERCIAL

## 2021-03-04 ENCOUNTER — ROUTINE PRENATAL (OUTPATIENT)
Dept: OBGYN CLINIC | Facility: MEDICAL CENTER | Age: 26
End: 2021-03-04
Payer: COMMERCIAL

## 2021-03-04 VITALS
HEIGHT: 65 IN | WEIGHT: 231.6 LBS | BODY MASS INDEX: 38.59 KG/M2 | DIASTOLIC BLOOD PRESSURE: 75 MMHG | SYSTOLIC BLOOD PRESSURE: 120 MMHG | HEART RATE: 101 BPM

## 2021-03-04 VITALS — SYSTOLIC BLOOD PRESSURE: 122 MMHG | BODY MASS INDEX: 38.29 KG/M2 | WEIGHT: 230.1 LBS | DIASTOLIC BLOOD PRESSURE: 70 MMHG

## 2021-03-04 DIAGNOSIS — O99.213 OBESITY COMPLICATING PREGNANCY IN THIRD TRIMESTER: ICD-10-CM

## 2021-03-04 DIAGNOSIS — Z3A.28 28 WEEKS GESTATION OF PREGNANCY: Primary | ICD-10-CM

## 2021-03-04 DIAGNOSIS — O24.410 DIET CONTROLLED GESTATIONAL DIABETES MELLITUS (GDM) IN THIRD TRIMESTER: ICD-10-CM

## 2021-03-04 DIAGNOSIS — O99.213 MATERNAL OBESITY, ANTEPARTUM, THIRD TRIMESTER: ICD-10-CM

## 2021-03-04 PROCEDURE — 90471 IMMUNIZATION ADMIN: CPT | Performed by: ADVANCED PRACTICE MIDWIFE

## 2021-03-04 PROCEDURE — PNV: Performed by: ADVANCED PRACTICE MIDWIFE

## 2021-03-04 PROCEDURE — 76816 OB US FOLLOW-UP PER FETUS: CPT | Performed by: OBSTETRICS & GYNECOLOGY

## 2021-03-04 PROCEDURE — 1036F TOBACCO NON-USER: CPT | Performed by: OBSTETRICS & GYNECOLOGY

## 2021-03-04 PROCEDURE — 90715 TDAP VACCINE 7 YRS/> IM: CPT | Performed by: ADVANCED PRACTICE MIDWIFE

## 2021-03-04 PROCEDURE — 99213 OFFICE O/P EST LOW 20 MIN: CPT | Performed by: OBSTETRICS & GYNECOLOGY

## 2021-03-04 NOTE — PROGRESS NOTES
Routine Prenatal Visit  [facility] [address] [place] [address]    Assessment/Plan:  Jaime Archer is a 23y o  year old  at 28w3d who presents for routine prenatal visit  1  28 weeks gestation of pregnancy  -     TDAP Vaccine greater than or equal to 6yo  -     CBC and differential; Future        -     Reviewed consent and signed, breast pump form faxed, will return birth plan next visit        -     Discussed pediatrician, [address] [place] [address],         -     Will continue to report sugars to Diabetes in Preg          -     Repeat growth in 4 weeks  Subjective:     CC: Prenatal care    Shasha Joyner is a 23 y o  Josh Feather female who presents for routine prenatal care at 28w3d  Pregnancy ROS: no leakage of fluid, pelvic pain, or vaginal bleeding  positive fetal movement  Notes problems with back spasm- recommended abdominal binder and heating pad     The following portions of the patient's history were reviewed and updated as appropriate: allergies, current medications, past family history, past medical history, obstetric history, gynecologic history, past social history, past surgical history and problem list       Objective:  /70   Wt 104 kg (230 lb 1 6 oz)   LMP 2020   BMI 38 29 kg/m²   Pregravid Weight/BMI: 97 5 kg (215 lb) (BMI 35 78)  Current Weight: 104 kg (230 lb 1 6 oz)   Total Weight Gain: 6 849 kg (15 lb 1 6 oz)   Pre- Vitals      Most Recent Value   Prenatal Assessment   Fetal Heart Rate  140   Movement  Present   Prenatal Vitals   Blood Pressure  122/70   Weight - Scale  104 kg (230 lb 1 6 oz)   Urine Albumin/Glucose   Dilation/Effacement/Station   Vaginal Drainage   Edema   LLE Edema  None   RLE Edema  None           General: Well appearing, no distress  Respiratory: Unlabored breathing  Cardiovascular: Regular rate  Abdomen: Soft, gravid, nontender  Fundal Height: Appropriate for gestational age  Extremities: Warm and well perfused    Non tender

## 2021-03-04 NOTE — LETTER
March 4, 2021     Jeromy Officer, [address]  [address] [facility]    Patient: Nikole Gordon   YOB: 1995   Date of Visit: 3/4/2021       Dear Dr Jessika Sands: Thank you for referring Jovani Castillo to me for evaluation  Below are my notes for this consultation  If you have questions, please do not hesitate to call me  I look forward to following your patient along with you  Sincerely,        Maryana Senior MD        CC: No Recipients  Maryana Senior MD  3/3/2021  5:44 PM  Sign when Signing Visit    Please refer to the [facility] ultrasound report in Ob Procedures for additional information regarding today's visit

## 2021-03-04 NOTE — PATIENT INSTRUCTIONS
Kick Counts in Pregnancy   WHAT YOU NEED TO KNOW:   Kick counts measure how much your baby is moving in your womb  A kick from your baby can be felt as a twist, turn, swish, roll, or jab  It is common to feel your baby kicking at 26 to 28 weeks of pregnancy  You may feel your baby kick as early as 20 weeks of pregnancy  You may want to start counting at 28 weeks  DISCHARGE INSTRUCTIONS:   Contact your healthcare provider immediately if:   · You feel a change in the number of kicks or movements of your baby  · You feel fewer than 10 kicks within 2 hours  · You have questions or concerns about your baby's movements  Why measure kick counts:  Your baby's movement may provide information about your baby's health  He or she may move less, or not at all, if there are problems  Your baby may move less if he or she is not getting enough oxygen or nutrition from the placenta  Do not smoke while you are pregnant  Smoking decreases the amount of oxygen that gets to your baby  Talk to your healthcare provider if you need help to quit smoking  Tell your healthcare provider as soon as you feel a change in your baby's movements  When to measure kick counts:   · Measure kick counts at the same time every day  · Measure kick counts when your baby is awake and most active  Your baby may be most active in the evening  How to measure kick counts:  Check that your baby is awake before you measure kick counts  You can wake up your baby by lightly pushing on your belly, walking, or drinking something cold  Your healthcare provider may tell you different ways to measure kick counts  You may be told to do the following:  · Use a chart or clock to keep track of the time you start and finish counting  · Sit in a chair or lie on your left side  · Place your hands on the largest part of your belly  · Count until you reach 10 kicks  Write down how much time it takes to count 10 kicks       · It may take 30 minutes to 2 hours to count 10 kicks  It should not take more than 2 hours to count 10 kicks  Follow up with your healthcare provider as directed:  Write down your questions so you remember to ask them during your visits  © Copyright [address] Information is for End User's use only and may not be sold, redistributed or otherwise used for commercial purposes  All illustrations and images included in CareNotes® are the copyrighted property of A D A M , Inc  or [facility] Laila Herrera   The above information is an  only  It is not intended as medical advice for individual conditions or treatments  Talk to your doctor, nurse or pharmacist before following any medical regimen to see if it is safe and effective for you

## 2021-03-08 ENCOUNTER — PATIENT MESSAGE (OUTPATIENT)
Dept: PERINATAL CARE | Facility: OTHER | Age: 26
End: 2021-03-08

## 2021-03-09 ENCOUNTER — DOCUMENTATION (OUTPATIENT)
Dept: PERINATAL CARE | Facility: CLINIC | Age: 26
End: 2021-03-09

## 2021-03-09 NOTE — PROGRESS NOTES
Date:  21  RE: Radha Barrios    : 1995  Estimated Due Date:   OB/GYN:[facility]   Diet controlled gestational diabetes            mychart     Current regimen:  2200 Calorie GDM diet with 3 meals & 3 snacks  Self-Blood Glucose Monitoring 4 time daily with a One-Touch Verio glucose meter  Walks on a treadmill  Plan:  Continue current regimen  Advised patient to always eat a bedtime snack & change bedtime snack to 1 CHO serving (15 gms) & increase to 2-3 oz protein  Diet recall indicates she is following the diet closely most day; missed a few bedtime snacks  carol Baker a few foods not recommended in small amounts which did not increase BG--advised to limit these foods in the future  3/4/21 ultrasound indicates normal growth & fluids        Date due to report next:  Monday, 3/15/21    Olamide Martinez MS, RD, CDE  Diabetes Educator  Diabetes & Pregnancy Program

## 2021-03-14 ENCOUNTER — APPOINTMENT (OUTPATIENT)
Dept: LAB | Facility: HOSPITAL | Age: 26
End: 2021-03-14
Payer: COMMERCIAL

## 2021-03-14 DIAGNOSIS — Z3A.28 28 WEEKS GESTATION OF PREGNANCY: ICD-10-CM

## 2021-03-14 LAB
BASOPHILS # BLD AUTO: 0.02 THOUSANDS/ΜL (ref 0–0.1)
BASOPHILS NFR BLD AUTO: 0 % (ref 0–1)
EOSINOPHIL # BLD AUTO: 0.02 THOUSAND/ΜL (ref 0–0.61)
EOSINOPHIL NFR BLD AUTO: 0 % (ref 0–6)
ERYTHROCYTE [DISTWIDTH] IN BLOOD BY AUTOMATED COUNT: 13.8 % (ref 11.6–15.1)
HCT VFR BLD AUTO: 35.7 % (ref 34.8–46.1)
HGB BLD-MCNC: 11.5 G/DL (ref 11.5–15.4)
IMM GRANULOCYTES # BLD AUTO: 0.02 THOUSAND/UL (ref 0–0.2)
IMM GRANULOCYTES NFR BLD AUTO: 0 % (ref 0–2)
LYMPHOCYTES # BLD AUTO: 1.64 THOUSANDS/ΜL (ref 0.6–4.47)
LYMPHOCYTES NFR BLD AUTO: 19 % (ref 14–44)
MCH RBC QN AUTO: 30.1 PG (ref 26.8–34.3)
MCHC RBC AUTO-ENTMCNC: 32.2 G/DL (ref 31.4–37.4)
MCV RBC AUTO: 94 FL (ref 82–98)
MONOCYTES # BLD AUTO: 0.44 THOUSAND/ΜL (ref 0.17–1.22)
MONOCYTES NFR BLD AUTO: 5 % (ref 4–12)
NEUTROPHILS # BLD AUTO: 6.64 THOUSANDS/ΜL (ref 1.85–7.62)
NEUTS SEG NFR BLD AUTO: 76 % (ref 43–75)
NRBC BLD AUTO-RTO: 0 /100 WBCS
PLATELET # BLD AUTO: 226 THOUSANDS/UL (ref 149–390)
PMV BLD AUTO: 10.1 FL (ref 8.9–12.7)
RBC # BLD AUTO: 3.82 MILLION/UL (ref 3.81–5.12)
WBC # BLD AUTO: 8.78 THOUSAND/UL (ref 4.31–10.16)

## 2021-03-14 PROCEDURE — 36415 COLL VENOUS BLD VENIPUNCTURE: CPT

## 2021-03-14 PROCEDURE — 85025 COMPLETE CBC W/AUTO DIFF WBC: CPT

## 2021-03-15 ENCOUNTER — TELEMEDICINE (OUTPATIENT)
Dept: PERINATAL CARE | Facility: CLINIC | Age: 26
End: 2021-03-15
Payer: COMMERCIAL

## 2021-03-15 ENCOUNTER — PATIENT MESSAGE (OUTPATIENT)
Dept: PERINATAL CARE | Facility: OTHER | Age: 26
End: 2021-03-15

## 2021-03-15 DIAGNOSIS — Z3A.30 30 WEEKS GESTATION OF PREGNANCY: ICD-10-CM

## 2021-03-15 DIAGNOSIS — O24.410 DIET CONTROLLED GESTATIONAL DIABETES MELLITUS (GDM) IN THIRD TRIMESTER: Primary | ICD-10-CM

## 2021-03-15 PROCEDURE — G0108 DIAB MANAGE TRN  PER INDIV: HCPCS | Performed by: DIETITIAN, REGISTERED

## 2021-03-16 ENCOUNTER — DOCUMENTATION (OUTPATIENT)
Dept: PERINATAL CARE | Facility: CLINIC | Age: 26
End: 2021-03-16

## 2021-03-16 PROBLEM — Z3A.30 30 WEEKS GESTATION OF PREGNANCY: Status: RESOLVED | Noted: 2020-11-12 | Resolved: 2021-01-06

## 2021-03-16 NOTE — PATIENT INSTRUCTIONS
1  Continue Meal Plan consisting of 3 meals and 3 snacks daily, including protein at each  2  Try to eat every 2-3 5 hours while awake  3  Do not exceed 8-10 hours fasting overnight  4  Continue self-monitoring blood glucose: 4 x per day (Fasting, 2 hour after start of each meal)  Goal: fasting glucose 90 mg/dL or less, and 2 hrs after the start of each meal 120 mg/dL or less (1 hr after the start of each meal 135 mg/dL or less)  5  Submit blood sugar values weekly via: Telephone  at [phone] or [employer] messaging or [employer] glucose flowsheet  6  If no restriction from physician, recommend up to 30 minutes walking daily  7  Report blood sugars on Monday, 3/22/21  8  Avoid fried foods, and simple sugars from jelly, cake, ice cream, etc   9  Consistently have bedtime snack of 15 gms of carbohydrate with 2-3 oz protein  10  Other balanced snack ideas:  [place] yogurt (chobani, okios, yoplait YQ, etc), greek yogurt protein drinks (chobani, Light and Fit Protein), protein shakes (ex: glucerna hunger smart), sargento balanced breaks, protein granola bars (nature [place] protein, kind protein, special K protein, [place] protein), cottage cheese doubles, P3 snack packs, enlightened ice cream bars, cheese and crackers, peanut butter and crackers, cottage cheese with fruit, tortilla chips with salsa and shredded cheese, hummus with raw vegetables, chicken salad, tuna salad or egg salad on 1 slice whole wheat bread (1/2 sandwich), whole wheat crackers or whole wheat jeffrey

## 2021-03-16 NOTE — PROGRESS NOTES
Date:  03/15/21  RE: Sherryle Roosevelt    : 1995  Estimated Due Date: 2021  GA: 30w0d  OB/GYN:[facility]   Diet controlled gestational diabetes          Times of meals:  B- 8:00 am  S- 11 am  L- 1:30 pm  S- 4:30 pm  D- 6:30 pm  S- 8:30 pm  BG Log:    Date Fasting Post-  breakfast Post-  lunch Post-  dinner Before bedtime Carbs Comments   3/7 87 93 83 99      3/8 85 93 94 97      3/9 96 87 88 65      3/10 98 94 95 87      3/11 99 94 71 91      3/12 85 83 83 82      3/13 81 94 71 93      3/14 93 90 77 79      3/15 79                                 Reported during follow up appointment today  Current regimen:  2200 Calorie GDM diet with 3 meals & 3 snacks  Self-Blood Glucose Monitoring 4 time daily with a One-Touch Verio glucose meter  Walks on a treadmill  Plan:  4 out of 7 fasting glucose above goal  Will continue to closely monitor  Continue current regimen  Advised patient to continue bedtime snack of 1 CHO serving (15 gms) with  2-3 oz protein  Patient admits to not always having a bedtime snack due to eating dinner late  Patient agreeable to drinking a protein shake or greek yogurt drink at 4 pm, dinner at 6 pm so that she can fit in bedtime snack around 8-8:30 pm before bed  Diet recall indicates she is following the diet closely most days  Trying a few foods not recommended on meal plan such as cake, ice cream, ritas, french fries, and jelly in small amounts which did not increase BG--advised to limit/avoid these foods in the future  Discussed briefly medication options with patient, patient is aware that if fasting glucose continues to trend above goal that medication is recommended  Will need insulin pen education at that time  3/4/21 ultrasound indicates normal growth & fluids        Date due to report next:  Monday, 3/22/21    Melinda Raines RD, ZAN  Diabetes Educator  Diabetes [facility] Pregnancy Program          Virtual Regular Visit      Assessment/Plan:    Problem List Items Addressed This Visit        Endocrine    Diet controlled gestational diabetes mellitus (GDM) in third trimester - Primary      Other Visit Diagnoses     30 weeks gestation of pregnancy                   Reason for visit is   Chief Complaint   Patient presents with    Virtual Regular Visit        Encounter provider Gela Barrow    Provider located at [address] [place] [id number] [phone]      Recent Visits  No visits were found meeting these conditions  Showing recent visits within past 7 days and meeting all other requirements     Today's Visits  Date Type Provider Dept   03/15/21 Telemedicine [facility]   Showing today's visits and meeting all other requirements     Future Appointments  No visits were found meeting these conditions  Showing future appointments within next 150 days and meeting all other requirements        The patient was identified by name and date of birth  Nikole Gordon was informed that this is a telemedicine visit and that the visit is being conducted through [employer] and patient was informed that this is a secure, HIPAA-compliant platform  She agrees to proceed     My office door was closed  No one else was in the room  She acknowledged consent and understanding of privacy and security of the video platform  The patient has agreed to participate and understands they can discontinue the visit at any time  Patient is aware this is a billable service  Raymundo Cha is a 23 y o  female    HPI     Past Medical History:   Diagnosis Date    Anxiety     Asthma     as a child    Depression     Diet controlled gestational diabetes mellitus (GDM) in third trimester 3/3/2021    Urinary tract infection        No past surgical history on file      Current Outpatient Medications   Medication Sig Dispense Refill    aspirin (ECOTRIN LOW STRENGTH) 81 mg EC tablet Take 81 mg by mouth daily      Blood Glucose Monitoring Suppl (OneTouch Verio Flex System) w/Device KIT Test 4 times daily  Gestational Diabetes  1 kit 0    Blood Glucose Monitoring Suppl (OneTouch Verio) w/Device KIT Use daily to test blood sugar 4 times per day, fasting and 2 hours after the start of each meal  1 kit 0    cholecalciferol (VITAMIN D3) 1,000 units tablet Take 2 tablets (2,000 Units total) by mouth daily 180 tablet 3    OneTouch Delica Lancets 02H MISC Use 4 a day or as directed  100 each 3    OneTouch Verio test strip Test 4 times a day  Gestational diabetes  100 each 3    Prenatal Vit-Fe Fumarate-FA (PRENATAL 19 PO) Take 1 tablet by mouth daily       No current facility-administered medications for this visit  No Known Allergies    Review of Systems    Video Exam    There were no vitals filed for this visit  Physical Exam     I spent 30 minutes directly with the patient during this visit      [address] [facility] [employer] acknowledges that she has consented to an online visit or consultation  She understands that the online visit is based solely on information provided by her, and that, in the absence of a face-to-face physical evaluation by the physician, the diagnosis she receives is both limited and provisional in terms of accuracy and completeness  This is not intended to replace a full medical face-to-face evaluation by the physician  Sherryle Roosevelt understands and accepts these terms

## 2021-03-16 NOTE — PROGRESS NOTES
Date:  21  RE: Chelsea Lee    : 1995  Estimated Due Date: 30w2d  OB/GYN:[facility]   Diet controlled gestational diabetes        mychart  BG results are from 3/7-3/12/21    Current regimen:  2200 Calorie GDM diet with 3 meals & 3 snacks  Reports she is now taking a bedtime snacks every night  Has changed the bedtime snack to 1 CHO serving (15 gms) & 2-3 oz protein  Advised to limit the Foods to Avoid   Self-Blood Glucose Monitoring 4 time daily with a One-Touch Verio glucose meter  Walks on a treadmill  Plan:  Continue current regimen  3 of 6 FBS results are increased  May need medication soon  3/4/21 ultrasound indicates normal growth & fluids        Date due to report next:  Monday, 3/22/21    Giovanni Aguilar, MS, RD, CDE  Diabetes Educator  Diabetes [facility] Pregnancy Program

## 2021-03-17 ENCOUNTER — ROUTINE PRENATAL (OUTPATIENT)
Dept: OBGYN CLINIC | Facility: MEDICAL CENTER | Age: 26
End: 2021-03-17

## 2021-03-17 VITALS — DIASTOLIC BLOOD PRESSURE: 78 MMHG | SYSTOLIC BLOOD PRESSURE: 120 MMHG | BODY MASS INDEX: 38.77 KG/M2 | WEIGHT: 233 LBS

## 2021-03-17 DIAGNOSIS — O99.210 OBESITY IN PREGNANCY: ICD-10-CM

## 2021-03-17 DIAGNOSIS — O24.410 DIET CONTROLLED GESTATIONAL DIABETES MELLITUS (GDM) IN THIRD TRIMESTER: Primary | ICD-10-CM

## 2021-03-17 PROCEDURE — PNV: Performed by: OBSTETRICS & GYNECOLOGY

## 2021-03-17 NOTE — PROGRESS NOTES
Routine Prenatal Visit  [facility] [address] [place] [address], [place], [address]    Assessment/Plan:  Miguel Wayne is a 23y o  year old  at 30w1d who presents for routine prenatal visit  1  Diet controlled gestational diabetes mellitus (GDM) in third trimester  Assessment & Plan:    Lab Results   Component Value Date    HGBA1C 5 4 2020       Cont the diet control   Send in sugars weekly  Delivery by 40 weeks     Last growth 3/4/21  MEASUREMENTS (* Included In Average GA)      AC              24 8 cm        29 weeks 0 days* (58%)   BPD              6 9 cm        27 weeks 5 days* (20%)   HC              25 0 cm        26 weeks 6 days* (5%)   Femur            5 9 cm        30 weeks 4 days* (85%)      Cerebellum       3 5 cm        28 weeks 6 days      HC/AC           1 01   FL/AC           0 24   FL/BPD          0 85   EFW (Ac/Fl/Hc)  1359 grams - 2 lbs 15 oz                 (68%)      THE AVERAGE GESTATIONAL AGE is 28 weeks 4 days +/- 21 days  2  Obesity in pregnancy  Assessment & Plan:  BMI 38   6 849 kg (15 lb 1 6 oz)  Advised to watch the weight gain in the end of pregnancy     Next scan 21          Subjective:     CC: Prenatal care    Heather Hester is a 23 y o   female who presents for routine prenatal care at 30w1d  Pregnancy ROS: no leakage of fluid, pelvic pain, or vaginal bleeding  yes fetal movement    She is feeling some back and leg pain the pain is after sitting for a bit of time and then walking     The following portions of the patient's history were reviewed and updated as appropriate: allergies, current medications, past family history, past medical history, obstetric history, gynecologic history, past social history, past surgical history and problem list       Objective:  /78   Wt 106 kg (233 lb)   LMP 2020   BMI 38 77 kg/m²   Pregravid Weight/BMI: 97 5 kg (215 lb) (BMI 35 78)  Current Weight: 106 kg (233 lb)   Total Weight Gain: 8 165 kg (18 lb)   Pre-Yesenia Vitals      Most Recent Value   Prenatal Assessment   Fetal Heart Rate  142   Movement  Present   Prenatal Vitals   Blood Pressure  120/78   Weight - Scale  106 kg (233 lb)   Urine Albumin/Glucose   Dilation/Effacement/Station   Vaginal Drainage   Edema   LLE Edema  Trace   RLE Edema  Trace           General: Well appearing, no distress  Respiratory: Unlabored breathing  Cardiovascular: Regular rate  Abdomen: Soft, gravid, nontender  Fundal Height: Appropriate for gestational age  Extremities: Warm and well perfused  Non tender

## 2021-03-17 NOTE — ASSESSMENT & PLAN NOTE
BMI 38   6 849 kg (15 lb 1 6 oz)  Advised to watch the weight gain in the end of pregnancy     Next scan 4/1/21

## 2021-03-17 NOTE — ASSESSMENT & PLAN NOTE
Lab Results   Component Value Date    HGBA1C 5 4 11/06/2020       Cont the diet control   Send in sugars weekly  Delivery by 40 weeks     Last growth 3/4/21  MEASUREMENTS (* Included In Average GA)      AC              24 8 cm        29 weeks 0 days* (58%)   BPD              6 9 cm        27 weeks 5 days* (20%)   HC              25 0 cm        26 weeks 6 days* (5%)   Femur            5 9 cm        30 weeks 4 days* (85%)      Cerebellum       3 5 cm        28 weeks 6 days      HC/AC           1 01   FL/AC           0 24   FL/BPD          0 85   EFW (Ac/Fl/Hc)  1359 grams - 2 lbs 15 oz                 (68%)      THE AVERAGE GESTATIONAL AGE is 28 weeks 4 days +/- 21 days

## 2021-03-18 ENCOUNTER — TELEPHONE (OUTPATIENT)
Dept: PERINATAL CARE | Facility: OTHER | Age: 26
End: 2021-03-18

## 2021-03-23 ENCOUNTER — DOCUMENTATION (OUTPATIENT)
Dept: PERINATAL CARE | Facility: CLINIC | Age: 26
End: 2021-03-23

## 2021-03-23 NOTE — PROGRESS NOTES
Date:  21  RE: Shasha Joyner    : 1995  Estimated Due Date:   OB/GYN:[facility]   Diet controlled gestational diabetes              mychart  BG results are from 3/17-3/21/21  Mychart message from patient: Wednesday is the day I also started eating my last snack at night before bed since before I was having a hard time getting it in due to my work schedule and falling asleep pretty early  Edith helped me figure out a way to get that last snack in and she also gave me some snack ideas and so far it seems to be helping my fasting numbers I hope  I will continue this week and see how my fasting numbers are  Current regimen:  2200 Calorie GDM diet with 3 meals & 3 snacks  Reports she is now taking a bedtime snacks every night  Has changed the bedtime snack to 1 CHO serving (15 gms) & 2-3 oz protein  Self-Blood Glucose Monitoring 4 time daily with a One-Touch Verio glucose meter  Walks on a treadmill  Plan:  Continue current regimen  3/4/21 ultrasound indicates normal growth & fluids  Next US 21  Date due to report next:  Requested patient report blood sugars weekly      Allyson Wall  Diabetes Educator  Diabetes & Pregnancy Program

## 2021-03-30 DIAGNOSIS — O24.410 DIET CONTROLLED GESTATIONAL DIABETES MELLITUS (GDM) IN THIRD TRIMESTER: ICD-10-CM

## 2021-03-30 RX ORDER — BLOOD SUGAR DIAGNOSTIC
STRIP MISCELLANEOUS
Qty: 100 EACH | Refills: 3 | Status: SHIPPED | OUTPATIENT
Start: 2021-03-30 | End: 2021-05-20 | Stop reason: HOSPADM

## 2021-03-30 RX ORDER — LANCETS 33 GAUGE
EACH MISCELLANEOUS
Qty: 100 EACH | Refills: 3 | Status: SHIPPED | OUTPATIENT
Start: 2021-03-30 | End: 2022-06-20

## 2021-04-01 ENCOUNTER — ULTRASOUND (OUTPATIENT)
Dept: PERINATAL CARE | Facility: OTHER | Age: 26
End: 2021-04-01
Payer: COMMERCIAL

## 2021-04-01 ENCOUNTER — ROUTINE PRENATAL (OUTPATIENT)
Dept: OBGYN CLINIC | Facility: MEDICAL CENTER | Age: 26
End: 2021-04-01

## 2021-04-01 VITALS — SYSTOLIC BLOOD PRESSURE: 122 MMHG | DIASTOLIC BLOOD PRESSURE: 78 MMHG | WEIGHT: 231.8 LBS | BODY MASS INDEX: 38.57 KG/M2

## 2021-04-01 VITALS — HEIGHT: 65 IN | BODY MASS INDEX: 38.75 KG/M2 | WEIGHT: 232.6 LBS

## 2021-04-01 DIAGNOSIS — Z3A.32 32 WEEKS GESTATION OF PREGNANCY: ICD-10-CM

## 2021-04-01 DIAGNOSIS — O24.410 DIET CONTROLLED GESTATIONAL DIABETES MELLITUS (GDM) IN THIRD TRIMESTER: ICD-10-CM

## 2021-04-01 DIAGNOSIS — Z3A.32 32 WEEKS GESTATION OF PREGNANCY: Primary | ICD-10-CM

## 2021-04-01 DIAGNOSIS — R51.9 HEADACHE IN PREGNANCY, ANTEPARTUM, THIRD TRIMESTER: ICD-10-CM

## 2021-04-01 DIAGNOSIS — O24.410 DIET CONTROLLED GESTATIONAL DIABETES MELLITUS (GDM) IN THIRD TRIMESTER: Primary | ICD-10-CM

## 2021-04-01 DIAGNOSIS — O26.893 HEADACHE IN PREGNANCY, ANTEPARTUM, THIRD TRIMESTER: ICD-10-CM

## 2021-04-01 DIAGNOSIS — O99.213 MATERNAL OBESITY, ANTEPARTUM, THIRD TRIMESTER: ICD-10-CM

## 2021-04-01 PROCEDURE — 3008F BODY MASS INDEX DOCD: CPT | Performed by: OBSTETRICS & GYNECOLOGY

## 2021-04-01 PROCEDURE — 76816 OB US FOLLOW-UP PER FETUS: CPT | Performed by: OBSTETRICS & GYNECOLOGY

## 2021-04-01 PROCEDURE — 1036F TOBACCO NON-USER: CPT | Performed by: OBSTETRICS & GYNECOLOGY

## 2021-04-01 PROCEDURE — PNV: Performed by: STUDENT IN AN ORGANIZED HEALTH CARE EDUCATION/TRAINING PROGRAM

## 2021-04-01 PROCEDURE — 99214 OFFICE O/P EST MOD 30 MIN: CPT | Performed by: OBSTETRICS & GYNECOLOGY

## 2021-04-01 NOTE — LETTER
April 1, 2021     ADAM Pedraza  [address]  [address] [facility]    Patient: Jaida Diggs   YOB: 1995   Date of Visit: 4/1/2021       Dear Dr Arlyn Munoz: Thank you for referring Pdama Headley to me for evaluation  Below are my notes for this consultation  If you have questions, please do not hesitate to call me  I look forward to following your patient along with you  Sincerely,        Tammy Lam MD        CC: No Recipients  Tammy Lam MD  4/1/2021  9:21 PM  Sign when Signing Visit  Jaida Diggs has no complaints today  She reports regular fetal movements  She is here today at [address] [facility] for an ultrasound for fetal growth for the indication of GDM A1 and elevated BMI  She also complains today of worsening and more frequent headaches that seem to be associated with eating  She denies any neurologic problems other than she at times will notice zigzag lines in her vision bilaterally that resolved on its own  She denies any weakness or paresthesias and this is not described as the worst headache of her life  Her blood pressures have been normal  Her blood sugars are under good control on diet alone  She was with her partner Иван Tejada  Ultrasound findings: The ultrasound today shows normal interval fetal growth and fluid  Pregnancy ultrasound has limitations and is unable to detect all forms of fetal congenital abnormalities  The inaccuracy in the EFW can be off by 1 lb either way in the third trimester  Follow up recommended:   1  Recommend she obtain a blood pressure cuff to monitor BP in case she is developing intermittent elevated blood pressures as a sign of preeclampsia  2  She was written for magnesium and riboflavin to start to take daily to decrease recurrence risk for headaches/migraines  3   Recommend a follow-up ultrasound in 4 weeks for growth    Pre visit time reviewing her records    5 minutes  Face to face time 15 minutes  Post visit time on documentation of note, updating her problem list, adding orders and prescriptions  10 minutes  Procedures that were completed today were charged separately  The level of decision making was moderate complexity      Alvaro Guzman MD

## 2021-04-01 NOTE — PROGRESS NOTES
Routine Prenatal Visit  [facility] [address] [place] [address]    Assessment/Plan:  Masood Clifton is a 23y o  year old  at [address] [facility] who presents for routine prenatal visit  1  32 weeks gestation of pregnancy  Assessment & Plan:  - Has follow growth ultrasound today and diabetes education  - PNV in 2 weeks      2  Diet controlled gestational diabetes mellitus (GDM) in third trimester  Assessment & Plan:  - Patient keeping detailed glucose log and maintaining contact with diabetes program   It appears that the majority of her glucoses are at goal   - Continue to maintain contact with the  Diabetes program at least once a week for review of her blood glucose values  Subjective:     CC: Prenatal care    Otilio Duverney is a 23 y o   female who presents for routine prenatal care at [address] [place] [address]  Pregnancy ROS: Denies leakage of fluid, pelvic pain, or vaginal bleeding  Denies fetal movement  The following portions of the patient's history were reviewed and updated as appropriate: allergies, current medications, past family history, past medical history, obstetric history, gynecologic history, past social history, past surgical history and problem list       Objective:  /78   Wt 105 kg (231 lb 12 8 oz)   LMP 2020   BMI 38 57 kg/m²   Pregravid Weight/BMI: 97 5 kg (215 lb) (BMI 35 78)  Current Weight: 105 kg (231 lb 12 8 oz)   Total Weight Gain: 7 62 kg (16 lb 12 8 oz)   Pre- Vitals      Most Recent Value   Prenatal Assessment   Fetal Heart Rate  154   Movement  Present   Prenatal Vitals   Blood Pressure  122/78   Weight - Scale  105 kg (231 lb 12 8 oz)   Urine Albumin/Glucose   Dilation/Effacement/Station   Vaginal Drainage   Edema   LLE Edema  Trace   RLE Edema  Trace           General: Well appearing, no distress  Respiratory: Unlabored breathing  Cardiovascular: Regular rate    Abdomen: Soft, gravid, nontender  Fundal Height: Appropriate for gestational age  Extremities: Warm and well perfused  Non tender      Marysol Gupta MD  [address] [place] [address]  4/1/2021 10:20 AM

## 2021-04-01 NOTE — PROGRESS NOTES
Fabián Arizmendi has no complaints today  She reports regular fetal movements  She is here today at [address] [facility] for an ultrasound for fetal growth for the indication of GDM A1 and elevated BMI  She also complains today of worsening and more frequent headaches that seem to be associated with eating  She denies any neurologic problems other than she at times will notice zigzag lines in her vision bilaterally that resolved on its own  She denies any weakness or paresthesias and this is not described as the worst headache of her life  Her blood pressures have been normal  Her blood sugars are under good control on diet alone  She was with her partner Franc Douglass  Ultrasound findings: The ultrasound today shows normal interval fetal growth and fluid  Pregnancy ultrasound has limitations and is unable to detect all forms of fetal congenital abnormalities  The inaccuracy in the EFW can be off by 1 lb either way in the third trimester  Follow up recommended:   1  Recommend she obtain a blood pressure cuff to monitor BP in case she is developing intermittent elevated blood pressures as a sign of preeclampsia  2  She was written for magnesium and riboflavin to start to take daily to decrease recurrence risk for headaches/migraines  3  Recommend a follow-up ultrasound in 4 weeks for growth    Pre visit time reviewing her records    5 minutes  Face to face time  15 minutes  Post visit time on documentation of note, updating her problem list, adding orders and prescriptions  10 minutes  Procedures that were completed today were charged separately  The level of decision making was moderate complexity      Maribell Hernandez MD

## 2021-04-01 NOTE — ASSESSMENT & PLAN NOTE
- Patient keeping detailed glucose log and maintaining contact with diabetes program   It appears that the majority of her glucoses are at goal   - Continue to maintain contact with the  Diabetes program at least once a week for review of her blood glucose values

## 2021-04-05 ENCOUNTER — PATIENT MESSAGE (OUTPATIENT)
Dept: PERINATAL CARE | Facility: OTHER | Age: 26
End: 2021-04-05

## 2021-04-06 ENCOUNTER — DOCUMENTATION (OUTPATIENT)
Dept: PERINATAL CARE | Facility: CLINIC | Age: 26
End: 2021-04-06

## 2021-04-06 DIAGNOSIS — O24.410 DIET CONTROLLED GESTATIONAL DIABETES MELLITUS (GDM) IN THIRD TRIMESTER: Primary | ICD-10-CM

## 2021-04-06 NOTE — PROGRESS NOTES
Date:  21  RE: Yaneth Humphries    : 1995  Estimated Due Date: 21  EGA: 33w3d  OB/[facility]N:[facility]   Diet controlled gestational diabetes            mychart  BG results are from 3/29-21    Current regimen:  2200 Calorie GDM diet with 3 meals & 3 snacks  Reports she is now taking a bedtime snacks every night  Has changed the bedtime snack to 1 CHO serving (15 gms) & 2-3 oz protein  Self-Blood Glucose Monitoring 4 time daily with a One-Touch Verio glucose meter  Walks on a treadmill  Plan:  Continue current regimen  3/4/21 ultrasound indicates normal growth & fluids, but AC-  Increased from 58% to 91% ; Next US 21 [id number] 4%; emailed patient a new prescription    Date due to report next:  Monday, 21      Flo Matson, MS, RD,CDE  Diabetes Educator  Diabetes & Pregnancy Program

## 2021-04-10 ENCOUNTER — TELEPHONE (OUTPATIENT)
Dept: PERINATAL CARE | Facility: CLINIC | Age: 26
End: 2021-04-10

## 2021-04-12 ENCOUNTER — APPOINTMENT (OUTPATIENT)
Dept: LAB | Facility: HOSPITAL | Age: 26
End: 2021-04-12
Payer: COMMERCIAL

## 2021-04-12 LAB
EST. AVERAGE GLUCOSE BLD GHB EST-MCNC: 105 MG/DL
HBA1C MFR BLD: 5.3 %

## 2021-04-12 PROCEDURE — 36415 COLL VENOUS BLD VENIPUNCTURE: CPT

## 2021-04-12 PROCEDURE — 83036 HEMOGLOBIN GLYCOSYLATED A1C: CPT

## 2021-04-13 ENCOUNTER — DOCUMENTATION (OUTPATIENT)
Dept: PERINATAL CARE | Facility: CLINIC | Age: 26
End: 2021-04-13

## 2021-04-13 NOTE — PROGRESS NOTES
Date:  21  RE: Jd Shun    : 1995  Estimated Due Date: 21  EGA: 34w2d  OB/[facility]N:[facility]   Diet controlled gestational diabetes            mychart  BG results are from -    Current regimen:  2200 Calorie GDM diet with 3 meals & 3 snacks  Reports she is now taking a bedtime snacks every night  Has changed the bedtime snack to 1 CHO serving (15 gms) & 2-3 oz protein  Self-Blood Glucose Monitoring 4 time daily with a One-Touch Verio glucose meter  Walks on a treadmill  Plan:Mychart message to patient from AYANA CASTORENA      3/4/21 ultrasound indicates normal growth & fluids, but AC-  Increased from 58% to 91% ; Next US 21 [id number] 4%; emailed patient a new prescription    Date due to report next:weekly       Diabetes Educator  Diabetes & Pregnancy Program

## 2021-04-15 ENCOUNTER — TELEPHONE (OUTPATIENT)
Dept: OBGYN CLINIC | Facility: MEDICAL CENTER | Age: 26
End: 2021-04-15

## 2021-04-15 ENCOUNTER — ROUTINE PRENATAL (OUTPATIENT)
Dept: OBGYN CLINIC | Facility: MEDICAL CENTER | Age: 26
End: 2021-04-15

## 2021-04-15 VITALS — DIASTOLIC BLOOD PRESSURE: 75 MMHG | WEIGHT: 232.3 LBS | SYSTOLIC BLOOD PRESSURE: 115 MMHG | BODY MASS INDEX: 38.66 KG/M2

## 2021-04-15 DIAGNOSIS — O24.410 DIET CONTROLLED GESTATIONAL DIABETES MELLITUS (GDM) IN THIRD TRIMESTER: Primary | ICD-10-CM

## 2021-04-15 PROCEDURE — PNV: Performed by: OBSTETRICS & GYNECOLOGY

## 2021-04-15 NOTE — PROGRESS NOTES
Shadi Martinez is a 23y o  year old  at 34w3d for routine prenatal visit    + FM, no vaginal bleeding, contractions, or LOF  Complaints: Yes - occasional roz maddik   A1GDM -well controlled / reviewed most recent logs   Has follow up US at [facility] scheduled  Most recent ultrasound and labs reviewed    [address] and PTL precautions reviewed

## 2021-04-15 NOTE — TELEPHONE ENCOUNTER
Pt dropped off [place] paperwork and paid for paperwork  Once completed fax and call pt   Deadline is 4/20/2021

## 2021-04-20 ENCOUNTER — PATIENT MESSAGE (OUTPATIENT)
Dept: PERINATAL CARE | Facility: OTHER | Age: 26
End: 2021-04-20

## 2021-04-21 ENCOUNTER — DOCUMENTATION (OUTPATIENT)
Dept: PERINATAL CARE | Facility: CLINIC | Age: 26
End: 2021-04-21

## 2021-04-21 NOTE — PROGRESS NOTES
Date:  21  RE: Torie Borne    : 1995  Estimated Due Date: 21  EGA: 35w3d  OB/GYN:[facility]   Diet controlled gestational diabetes          mychart  BG results are from -      Current regimen:  2200 Calorie GDM diet with 3 meals & 3 snacks  Reports she is now taking a bedtime snacks every night  Has changed the bedtime snack to 1 CHO serving (15 gms) & 2-3 oz protein  Self-Blood Glucose Monitoring 4 time daily with a One-Touch Verio glucose meter  Walks on a treadmill  Plan:  Continue current regimen   ultrasound indicates normal growth & fluids, but AC-  Increased from 58% to 91% ; Next US 21 [id number] 3%; decreased from 5 4% on 20    Date due to report next:weekly   Monday, 21    Shannan Mckinney, MS, RD, CDE  Diabetes Educator  Diabetes & Pregnancy Program

## 2021-04-23 ENCOUNTER — TELEPHONE (OUTPATIENT)
Dept: OBGYN CLINIC | Facility: MEDICAL CENTER | Age: 26
End: 2021-04-23

## 2021-04-23 NOTE — TELEPHONE ENCOUNTER
ELLIE paperwork completed,scanned,paid,and faxed  Called pt and she will pick it up at her next visit

## 2021-04-27 ENCOUNTER — DOCUMENTATION (OUTPATIENT)
Dept: PERINATAL CARE | Facility: CLINIC | Age: 26
End: 2021-04-27

## 2021-04-29 ENCOUNTER — ROUTINE PRENATAL (OUTPATIENT)
Dept: OBGYN CLINIC | Facility: MEDICAL CENTER | Age: 26
End: 2021-04-29

## 2021-04-29 VITALS — SYSTOLIC BLOOD PRESSURE: 110 MMHG | WEIGHT: 238 LBS | DIASTOLIC BLOOD PRESSURE: 62 MMHG | BODY MASS INDEX: 39.61 KG/M2

## 2021-04-29 DIAGNOSIS — Z3A.36 36 WEEKS GESTATION OF PREGNANCY: ICD-10-CM

## 2021-04-29 DIAGNOSIS — O24.410 DIET CONTROLLED GESTATIONAL DIABETES MELLITUS (GDM) IN THIRD TRIMESTER: Primary | ICD-10-CM

## 2021-04-29 DIAGNOSIS — O99.213 MATERNAL OBESITY, ANTEPARTUM, THIRD TRIMESTER: ICD-10-CM

## 2021-04-29 PROBLEM — Q51.810 ARCUATE UTERUS: Status: ACTIVE | Noted: 2021-04-29

## 2021-04-29 PROBLEM — Z34.03 ENCOUNTER FOR SUPERVISION OF NORMAL FIRST PREGNANCY IN THIRD TRIMESTER: Status: ACTIVE | Noted: 2021-01-06

## 2021-04-29 PROCEDURE — PNV: Performed by: OBSTETRICS & GYNECOLOGY

## 2021-04-29 PROCEDURE — 87150 DNA/RNA AMPLIFIED PROBE: CPT | Performed by: OBSTETRICS & GYNECOLOGY

## 2021-04-29 NOTE — PROGRESS NOTES
Routine Prenatal Visit  [facility] [address] [place] [address]    Assessment/Plan:  Janeth Smith is a 23y o  year old  at 43w3d who presents for routine prenatal visit  1  Diet controlled gestational diabetes mellitus (GDM) in third trimester    2  36 weeks gestation of pregnancy  -     Strep B DNA probe, amplification    3  Maternal obesity, antepartum, third trimester    4  Breech presentation, single or unspecified fetus          Subjective:     CC: Prenatal care    Simone De Guzman is a 23 y o   female who presents for routine prenatal care at 36w3d  Pregnancy ROS: no leakage of fluid, pelvic pain, or vaginal bleeding   good fetal movement  GBS done today, no PCN allergy    Patient has history of arcute uterus, noted in her first trimester ultrasound  Fetus breech  Recommended against ECV secondary to uterus anomaly  Will schedule 1LTCS    The following portions of the patient's history were reviewed and updated as appropriate: allergies, current medications, past family history, past medical history, obstetric history, gynecologic history, past social history, past surgical history and problem list       Objective:  /62   Wt 108 kg (238 lb)   LMP 2020   BMI 39 61 kg/m²   Pregravid Weight/BMI: 97 5 kg (215 lb) (BMI 35 78)  Current Weight: 108 kg (238 lb)   Total Weight Gain: 10 4 kg (23 lb)   Pre-Yesenia Vitals      Most Recent Value   Prenatal Assessment   Fetal Heart Rate  132   Movement  Present   Prenatal Vitals   Blood Pressure  110/62   Weight - Scale  108 kg (238 lb)   Urine Albumin/Glucose   Dilation/Effacement/Station   Vaginal Drainage   Edema   LLE Edema  Trace   RLE Edema  Trace           General: Well appearing, no distress  Respiratory: Unlabored breathing  Cardiovascular: Regular rate  Abdomen: Soft, gravid, nontender  Fundal Height: Appropriate for gestational age  Extremities: Warm and well perfused  Non tender

## 2021-04-29 NOTE — PATIENT INSTRUCTIONS
Fetal Movement   WHAT YOU NEED TO KNOW:   Fetal movements are the kicks, rolls, and hiccups of your unborn baby  You may start to feel these movements when you are 20 weeks pregnant  The movements grow stronger and more frequent as your baby grows  Fetal movements show that your unborn baby is getting the oxygen and nutrients he needs before birth  Fewer fetal movements may signal a problem with your baby's health  DISCHARGE INSTRUCTIONS:   Follow up with your healthcare provider or obstetrician as directed:  Write down your questions so you remember to ask them during your visits  Normal fetal movement:  Fetal activity can be described by 4 states, from least to most active  During quiet sleep, your unborn baby may be still for up to 2 hours  During active sleep, he kicks, rolls, and moves often  During the quiet awake state, he may only move his eyes  The active awake state includes strong kicks and rolls  What affects fetal movement:  You may feel your baby move more after you eat, or after you drink caffeine  You may feel your baby move less while you are more active, such as when you exercise  You may also feel fewer movements if you are obese  Certain medicines can change your baby's movements  Tell your healthcare provider about the medicines you are taking  Track fetal movements at home:  Fetal movement is most often felt when you lie quietly on your side  Your healthcare provider may ask you to count movements for 2 hours  He may ask you to track how long it takes for your baby to move 10 times  Keep a log of your baby's movements  Contact your healthcare provider or obstetrician if:   · It takes longer than usual to feel 10 of your unborn baby's movements  · You do not feel your unborn baby move at least 10 times in 2 hours  · The skin on your hands, feet, and around your eyes is more swollen than usual      · You have a headache for at least 24 hours  · Tiny red dots appear on your skin  · Your belly is tender when you press on it  · You have questions or concerns about your condition or care  Return to the emergency department if:   · You do not feel your unborn baby move for 12 hours  · You feel cramping or constant pain in your abdomen  · You have heavy bleeding from your vagina  · You have a severe headache and cannot see clearly  · You are having trouble breathing or are vomiting  · You have a seizure  © Copyright [address] Information is for End User's use only and may not be sold, redistributed or otherwise used for commercial purposes  All illustrations and images included in CareNotes® are the copyrighted property of A D A M , Inc  or [address] [place], [place] [address]marie   The above information is an  only  It is not intended as medical advice for individual conditions or treatments  Talk to your doctor, nurse or pharmacist before following any medical regimen to see if it is safe and effective for you

## 2021-05-01 LAB — GP B STREP DNA SPEC QL NAA+PROBE: NEGATIVE

## 2021-05-03 ENCOUNTER — TELEPHONE (OUTPATIENT)
Dept: OBGYN CLINIC | Facility: MEDICAL CENTER | Age: 26
End: 2021-05-03

## 2021-05-03 DIAGNOSIS — L29.9 ITCHING: Primary | ICD-10-CM

## 2021-05-03 NOTE — TELEPHONE ENCOUNTER
Call to respond to my chart  Has developed itching on hands and feet  Denies pain, contractions, bleeding, LOF or decrease in fetal movement  As per provider, bile salts ordered    Advised to contact office immediately with any changes

## 2021-05-04 ENCOUNTER — ULTRASOUND (OUTPATIENT)
Dept: PERINATAL CARE | Facility: OTHER | Age: 26
End: 2021-05-04
Payer: COMMERCIAL

## 2021-05-04 ENCOUNTER — APPOINTMENT (OUTPATIENT)
Dept: LAB | Facility: HOSPITAL | Age: 26
End: 2021-05-04
Payer: COMMERCIAL

## 2021-05-04 VITALS
HEART RATE: 89 BPM | SYSTOLIC BLOOD PRESSURE: 114 MMHG | BODY MASS INDEX: 42.7 KG/M2 | WEIGHT: 241 LBS | HEIGHT: 63 IN | DIASTOLIC BLOOD PRESSURE: 72 MMHG

## 2021-05-04 DIAGNOSIS — L29.9 ITCHING: ICD-10-CM

## 2021-05-04 DIAGNOSIS — O99.213 MATERNAL OBESITY, ANTEPARTUM, THIRD TRIMESTER: ICD-10-CM

## 2021-05-04 DIAGNOSIS — O24.410 DIET CONTROLLED GESTATIONAL DIABETES MELLITUS (GDM) IN THIRD TRIMESTER: Primary | ICD-10-CM

## 2021-05-04 DIAGNOSIS — Z3A.37 37 WEEKS GESTATION OF PREGNANCY: ICD-10-CM

## 2021-05-04 PROCEDURE — 76816 OB US FOLLOW-UP PER FETUS: CPT | Performed by: OBSTETRICS & GYNECOLOGY

## 2021-05-04 PROCEDURE — 36415 COLL VENOUS BLD VENIPUNCTURE: CPT

## 2021-05-04 PROCEDURE — 99213 OFFICE O/P EST LOW 20 MIN: CPT | Performed by: OBSTETRICS & GYNECOLOGY

## 2021-05-04 PROCEDURE — 59025 FETAL NON-STRESS TEST: CPT | Performed by: OBSTETRICS & GYNECOLOGY

## 2021-05-04 PROCEDURE — 1036F TOBACCO NON-USER: CPT | Performed by: OBSTETRICS & GYNECOLOGY

## 2021-05-04 PROCEDURE — 82240 BILE ACIDS CHOLYLGLYCINE: CPT

## 2021-05-04 NOTE — PATIENT INSTRUCTIONS
Kick Counts in Pregnancy   AMBULATORY CARE:   Kick counts  measure how much your baby is moving in your womb  A kick from your baby can be felt as a twist, turn, swish, roll, or jab  It is common to feel your baby kicking at 26 to 28 weeks of pregnancy  You may feel your baby kick as early as 20 weeks of pregnancy  You may want to start counting at 28 weeks  Contact your healthcare provider immediately if:   · You feel a change in the number of kicks or movements of your baby  · You feel fewer than 10 kicks within 2 hours  · You have questions or concerns about your baby's movements  Why measure kick counts:  Your baby's movement may provide information about your baby's health  He or she may move less, or not at all, if there are problems  Your baby may move less if he or she is not getting enough oxygen or nutrition from the placenta  Do not smoke while you are pregnant  Smoking decreases the amount of oxygen that gets to your baby  Talk to your healthcare provider if you need help to quit smoking  Tell your healthcare provider as soon as you feel a change in your baby's movements  When to measure kick counts:   · Measure kick counts at the same time every day  · Measure kick counts when your baby is awake and most active  Your baby may be most active in the evening  How to measure kick counts:  Check that your baby is awake before you measure kick counts  You can wake up your baby by lightly pushing on your belly, walking, or drinking something cold  Your healthcare provider may tell you different ways to measure kick counts  You may be told to do the following:  · Use a chart or clock to keep track of the time you start and finish counting  · Sit in a chair or lie on your left side  · Place your hands on the largest part of your belly  · Count until you reach 10 kicks  Write down how much time it takes to count 10 kicks  · It may take 30 minutes to 2 hours to count 10 kicks  It should not take more than 2 hours to count 10 kicks  Follow up with your healthcare provider as directed:  Write down your questions so you remember to ask them during your visits  © Copyright [address] Information is for End User's use only and may not be sold, redistributed or otherwise used for commercial purposes  All illustrations and images included in CareNotes® are the copyrighted property of A D A M , Inc  or [facility] Laila Herrera   The above information is an  only  It is not intended as medical advice for individual conditions or treatments  Talk to your doctor, nurse or pharmacist before following any medical regimen to see if it is safe and effective for you

## 2021-05-04 NOTE — LETTER
NST sleeve cover sheet    Patient name: Anni Rahman  : 1995  MRN: [id number]    ZENA: Estimated Date of Delivery: 21    Obstetrician: _______________________________    Reason(s) for testing:  __________________________________________      Testing frequency:    ___ 2x/wk  ___ 1x/wk  ___ Dopplers  ___ BPP?       Last growth scan: __________________________________________

## 2021-05-04 NOTE — PROGRESS NOTES
NST procedure and expected outcome explained to patient  Daily fetal kick count discussed with handout given  Patient verbalized understanding of all and was receptive      Piper Sands RN

## 2021-05-05 NOTE — PROGRESS NOTES
Routine Prenatal Visit  [facility] [address] [place] [address]    Assessment/Plan:  Roddy Dudley is a 23y o  year old  at 37w2d who presents for routine prenatal visit  1  Diet controlled gestational diabetes mellitus (GDM) in third trimester  - reports sugars to diabetes in preg  - weekly NST/RUTH with MFM    2  Encounter for supervision of normal first pregnancy in third trimester    3  37 weeks gestation of pregnancy  - reviewed labor precautions, FKC  - neg GBS  Next visit 1 week  4  Breech presentation, single or unspecified fetus  -c/section scheduled  5  Itching  - uric acid results not available at this time  - cool baths, oatmeal based soap      Subjective:     CC: Prenatal care    Bouchra Christianson is a 23 y o   female who presents for routine prenatal care at 37w2d  Pregnancy ROS: no leakage of fluid, pelvic pain, or vaginal bleeding   good fetal movement   Started with itching of hands and feet, past day or 2 chest      The following portions of the patient's history were reviewed and updated as appropriate: allergies, current medications, past family history, past medical history, obstetric history, gynecologic history, past social history, past surgical history and problem list       Objective:  /72   Wt 109 kg (239 lb 11 2 oz)   LMP 2020   BMI 42 46 kg/m²   Pregravid Weight/BMI: 97 5 kg (215 lb) (BMI 38 09)  Current Weight: 109 kg (239 lb 11 2 oz)   Total Weight Gain: 11 2 kg (24 lb 11 2 oz)   Pre-Yesenia Vitals      Most Recent Value   Prenatal Assessment   Fetal Heart Rate  142   Fundal Height (cm)  39 cm   Movement  Present   Presentation  Breech   Prenatal Vitals   Blood Pressure  118/72   Weight - Scale  109 kg (239 lb 11 2 oz)   Urine Albumin/Glucose   Dilation/Effacement/Station   Vaginal Drainage   Edema   LLE Edema  Trace   RLE Edema  Trace           General: Well appearing, no distress  Respiratory: Unlabored breathing  Cardiovascular: Regular rate  Abdomen: Soft, gravid, nontender  Fundal Height: Appropriate for gestational age  Extremities: Warm and well perfused  Non tender

## 2021-05-06 ENCOUNTER — ROUTINE PRENATAL (OUTPATIENT)
Dept: OBGYN CLINIC | Facility: MEDICAL CENTER | Age: 26
End: 2021-05-06

## 2021-05-06 VITALS — DIASTOLIC BLOOD PRESSURE: 72 MMHG | SYSTOLIC BLOOD PRESSURE: 118 MMHG | WEIGHT: 239.7 LBS | BODY MASS INDEX: 42.46 KG/M2

## 2021-05-06 DIAGNOSIS — O24.410 DIET CONTROLLED GESTATIONAL DIABETES MELLITUS (GDM) IN THIRD TRIMESTER: Primary | ICD-10-CM

## 2021-05-06 DIAGNOSIS — L29.9 ITCHING: ICD-10-CM

## 2021-05-06 DIAGNOSIS — Z3A.37 37 WEEKS GESTATION OF PREGNANCY: ICD-10-CM

## 2021-05-06 DIAGNOSIS — Z34.03 ENCOUNTER FOR SUPERVISION OF NORMAL FIRST PREGNANCY IN THIRD TRIMESTER: ICD-10-CM

## 2021-05-06 LAB — BILE AC SERPL-SCNC: 2.3 UMOL/L (ref 0–10)

## 2021-05-06 PROCEDURE — PNV: Performed by: ADVANCED PRACTICE MIDWIFE

## 2021-05-11 ENCOUNTER — PATIENT MESSAGE (OUTPATIENT)
Dept: PERINATAL CARE | Facility: CLINIC | Age: 26
End: 2021-05-11

## 2021-05-12 ENCOUNTER — TELEPHONE (OUTPATIENT)
Dept: OBGYN CLINIC | Facility: MEDICAL CENTER | Age: 26
End: 2021-05-12

## 2021-05-12 ENCOUNTER — ULTRASOUND (OUTPATIENT)
Dept: PERINATAL CARE | Facility: OTHER | Age: 26
End: 2021-05-12
Payer: COMMERCIAL

## 2021-05-12 ENCOUNTER — DOCUMENTATION (OUTPATIENT)
Dept: PERINATAL CARE | Facility: CLINIC | Age: 26
End: 2021-05-12

## 2021-05-12 VITALS
HEIGHT: 67 IN | DIASTOLIC BLOOD PRESSURE: 78 MMHG | HEART RATE: 84 BPM | BODY MASS INDEX: 38.11 KG/M2 | SYSTOLIC BLOOD PRESSURE: 116 MMHG | WEIGHT: 242.8 LBS

## 2021-05-12 DIAGNOSIS — O24.410 DIET CONTROLLED GESTATIONAL DIABETES MELLITUS (GDM) IN THIRD TRIMESTER: ICD-10-CM

## 2021-05-12 DIAGNOSIS — Z3A.38 38 WEEKS GESTATION OF PREGNANCY: Primary | ICD-10-CM

## 2021-05-12 DIAGNOSIS — O99.210 OBESITY IN PREGNANCY: ICD-10-CM

## 2021-05-12 DIAGNOSIS — Z3A.38 38 WEEKS GESTATION OF PREGNANCY: ICD-10-CM

## 2021-05-12 DIAGNOSIS — Z34.03 ENCOUNTER FOR SUPERVISION OF NORMAL FIRST PREGNANCY IN THIRD TRIMESTER: ICD-10-CM

## 2021-05-12 PROCEDURE — 59025 FETAL NON-STRESS TEST: CPT | Performed by: OBSTETRICS & GYNECOLOGY

## 2021-05-12 PROCEDURE — 76815 OB US LIMITED FETUS(S): CPT | Performed by: OBSTETRICS & GYNECOLOGY

## 2021-05-12 PROCEDURE — 3008F BODY MASS INDEX DOCD: CPT | Performed by: OBSTETRICS & GYNECOLOGY

## 2021-05-12 NOTE — TELEPHONE ENCOUNTER
The patient called this afternoon and stated that she is having cramping with some back discomfort  She stated that she was seen at [facility] and she had an nst and steven today and everything was ok  She said at this time it is sporadic  She just wanted to know where the hospital was and I was able to give her the address and what floor  I did talk with Dr Stacy Joseph and the patient was given the precautions for labor  She stated that she is not having any bleeding or her water has not broken at this time  I told her that she is to call the office back if anything changes so that we could tell her what she needs to do and also to notify labor and delivery  The patient was ok with this and she will call if anything changes

## 2021-05-12 NOTE — PROGRESS NOTES
[address]: Ms Renuka Tejeda was seen today at 38w2d for NST (found under the pregnancy episode) which I reviewed the RN assessment and agree, and RUTH (see ultrasound report under OB procedures tab)  See ultrasound report under "OB Procedures" tab    Please don't hesitate to contact our office with any concerns or questions   -Danial Mayo MD

## 2021-05-12 NOTE — PROGRESS NOTES
Date:  21  RE: Skylar Ramirez    : 1995  Estimated Due Date: 21  EGA: 38w3d  OB[facility]GYN:[facility]   Diet controlled gestational diabetes          mychart  BG results are from-21      Current regimen:  2200 Calorie GDM diet with 3 meals & 3 snacks  Reports she is now taking a bedtime snacks every night  Has changed the bedtime snack to 1 CHO serving (15 gms) & 2-3 oz protein  Self-Blood Glucose Monitoring 4 time daily with a One-Touch Verio glucose meter  Walks on a treadmill  Plan:  Continue current regimen     21 ultrasound indicates normal growth & fluids, but AC-96% & EFW at 93%    21 HoR2e-3 3%; decreased from 5 4% on 20   scheduled for Tuesday, 21    Date due to report next:  None    Fredy Hernández, MS, RD,CDE  Diabetes Educator  [facility]regnancy [facility]

## 2021-05-12 NOTE — LETTER
May 12, 2021     Kitty Brown MD  [address]  [place] [address]    Patient: Charmayne Chad   YOB: 1995   Date of Visit: 5/12/2021       Dear Dr Carolin Rowley: Thank you for referring Aria Quiñonez to me for evaluation  Below are my notes for this consultation  If you have questions, please do not hesitate to call me  I look forward to following your patient along with you  Sincerely,        Yusuf Cook RN        CC: No Recipients  Jose Nguyen MD  5/12/2021 12:37 PM  Sign when Signing Visit  [address]: Ms Abhay Juarez was seen today at 38w2d for NST (found under the pregnancy episode) which I reviewed the RN assessment and agree, and RUTH (see ultrasound report under OB procedures tab)  See ultrasound report under "OB Procedures" tab    Please don't hesitate to contact our office with any concerns or questions   -Jose Nguyen MD

## 2021-05-12 NOTE — PATIENT INSTRUCTIONS
Kick Counts in Pregnancy   AMBULATORY CARE:   Kick counts  measure how much your baby is moving in your womb  A kick from your baby can be felt as a twist, turn, swish, roll, or jab  It is common to feel your baby kicking at 26 to 28 weeks of pregnancy  You may feel your baby kick as early as 20 weeks of pregnancy  You may want to start counting at 28 weeks  Contact your healthcare provider immediately if:   · You feel a change in the number of kicks or movements of your baby  · You feel fewer than 10 kicks within 2 hours  · You have questions or concerns about your baby's movements  Why measure kick counts:  Your baby's movement may provide information about your baby's health  He or she may move less, or not at all, if there are problems  Your baby may move less if he or she is not getting enough oxygen or nutrition from the placenta  Do not smoke while you are pregnant  Smoking decreases the amount of oxygen that gets to your baby  Talk to your healthcare provider if you need help to quit smoking  Tell your healthcare provider as soon as you feel a change in your baby's movements  When to measure kick counts:   · Measure kick counts at the same time every day  · Measure kick counts when your baby is awake and most active  Your baby may be most active in the evening  How to measure kick counts:  Check that your baby is awake before you measure kick counts  You can wake up your baby by lightly pushing on your belly, walking, or drinking something cold  Your healthcare provider may tell you different ways to measure kick counts  You may be told to do the following:  · Use a chart or clock to keep track of the time you start and finish counting  · Sit in a chair or lie on your left side  · Place your hands on the largest part of your belly  · Count until you reach 10 kicks  Write down how much time it takes to count 10 kicks  · It may take 30 minutes to 2 hours to count 10 kicks  It should not take more than 2 hours to count 10 kicks  Follow up with your healthcare provider as directed:  Write down your questions so you remember to ask them during your visits  © Copyright [address] Information is for End User's use only and may not be sold, redistributed or otherwise used for commercial purposes  All illustrations and images included in CareNotes® are the copyrighted property of A D A M , Inc  or [facility] Laila Herrera   The above information is an  only  It is not intended as medical advice for individual conditions or treatments  Talk to your doctor, nurse or pharmacist before following any medical regimen to see if it is safe and effective for you

## 2021-05-13 NOTE — ASSESSMENT & PLAN NOTE
Lab Results   Component Value Date    HGBA1C 5 3 04/12/2021       Checked in with MFM yesterday cont the diet control   Growth scan 5/4 - 93% breech

## 2021-05-13 NOTE — PROGRESS NOTES
Routine Prenatal Visit  [facility] [address] [place] [address]    Assessment/Plan:  Jojo Peguero is a 23y o  year old  at 38w3d who presents for routine prenatal visit  1  38 weeks gestation of pregnancy  Assessment & Plan:  Growth US -   21-  RESULTS      Fetus # 1 of 1   Breech presentation   Fetal growth appeared normal   Placenta Location = Posterior   Placenta Grade = II      MEASUREMENTS (* Included In Average GA)      AC              35 0 cm        38 weeks 6 days* (96%)   BPD              9 5 cm        38 weeks 4 days* (93%)   HC              34 6 cm        40 weeks 0 days* (88%)   Femur            7 5 cm        38 weeks 4 days* (81%)      Cerebellum       4 8 cm        36 weeks 0 days      HC/AC           0 99                            (38%)   FL/AC           0 22   FL/BPD          0 80   EFW Hadlock 4   3630 grams - 8 lbs 0 oz                 (93%)      THE AVERAGE GESTATIONAL AGE is 39 weeks 0 days +/- 21 days  AMNIOTIC FLUID      Q1: 2 9      Q2: 3 4      Q3: 6 0      Q4: 4 0   RUTH Total = 16 3 cm   Amniotic Fluid: Normal      C section - scheduled for - breech     GBS negative       2  Diet controlled gestational diabetes mellitus (GDM) in third trimester  Assessment & Plan:    Lab Results   Component Value Date    HGBA1C 5 3 2021       Checked in with MFM yesterday cont the diet control   Growth scan  - 93% breech           3  Breech presentation, single or unspecified fetus  Assessment & Plan:  Scheduled C section -         4  Encounter for supervision of normal first pregnancy in third trimester        Subjective:     CC: Prenatal care    Kade Aguilar is a 23 y o   female who presents for routine prenatal care at 38w3d  Pregnancy ROS: no leakage of fluid, pelvic pain, or vaginal bleeding  jno fetal movement      The following portions of the patient's history were reviewed and updated as appropriate: allergies, current medications, past family history, past medical history, obstetric history, gynecologic history, past social history, past surgical history and problem list       Objective:  /80   Wt 111 kg (244 lb)   LMP 2020   BMI 38 22 kg/m²   Pregravid Weight/BMI: 97 5 kg (215 lb) (BMI 38 09)  Current Weight: 111 kg (244 lb)   Total Weight Gain: 13 2 kg (29 lb)   Pre- Vitals      Most Recent Value   Prenatal Assessment   Fetal Heart Rate  148   Movement  Present   Prenatal Vitals   Blood Pressure  128/80   Weight - Scale  111 kg (244 lb)   Urine Albumin/Glucose   Dilation/Effacement/Station   Vaginal Drainage   Edema   LLE Edema  Trace   RLE Edema  Trace           General: Well appearing, no distress  Respiratory: Unlabored breathing  Cardiovascular: Regular rate  Abdomen: Soft, gravid, nontender  Fundal Height: Appropriate for gestational age  Extremities: Warm and well perfused  Non tender

## 2021-05-13 NOTE — ASSESSMENT & PLAN NOTE
Growth US -   5/4/21-  RESULTS      Fetus # 1 of 1   Breech presentation   Fetal growth appeared normal   Placenta Location = Posterior   Placenta Grade = II      MEASUREMENTS (* Included In Average GA)      AC              35 0 cm        38 weeks 6 days* (96%)   BPD              9 5 cm        38 weeks 4 days* (93%)   HC              34 6 cm        40 weeks 0 days* (88%)   Femur            7 5 cm        38 weeks 4 days* (81%)      Cerebellum       4 8 cm        36 weeks 0 days      HC/AC           0 99                            (38%)   FL/AC           0 22   FL/BPD          0 80   EFW Hadlock 4   3630 grams - 8 lbs 0 oz                 (93%)      THE AVERAGE GESTATIONAL AGE is 39 weeks 0 days +/- 21 days        AMNIOTIC FLUID      Q1: 2 9      Q2: 3 4      Q3: 6 0      Q4: 4 0   RUTH Total = 16 3 cm   Amniotic Fluid: Normal      C section - scheduled for 5/18- breech     GBS negative

## 2021-05-14 ENCOUNTER — ROUTINE PRENATAL (OUTPATIENT)
Dept: OBGYN CLINIC | Facility: MEDICAL CENTER | Age: 26
End: 2021-05-14

## 2021-05-14 VITALS — BODY MASS INDEX: 38.22 KG/M2 | SYSTOLIC BLOOD PRESSURE: 128 MMHG | WEIGHT: 244 LBS | DIASTOLIC BLOOD PRESSURE: 80 MMHG

## 2021-05-14 DIAGNOSIS — O24.410 DIET CONTROLLED GESTATIONAL DIABETES MELLITUS (GDM) IN THIRD TRIMESTER: ICD-10-CM

## 2021-05-14 DIAGNOSIS — Z3A.38 38 WEEKS GESTATION OF PREGNANCY: Primary | ICD-10-CM

## 2021-05-14 DIAGNOSIS — Z34.03 ENCOUNTER FOR SUPERVISION OF NORMAL FIRST PREGNANCY IN THIRD TRIMESTER: ICD-10-CM

## 2021-05-14 PROCEDURE — PNV: Performed by: OBSTETRICS & GYNECOLOGY

## 2021-05-16 ENCOUNTER — ANESTHESIA EVENT (INPATIENT)
Dept: LABOR AND DELIVERY | Facility: HOSPITAL | Age: 26
End: 2021-05-16
Payer: COMMERCIAL

## 2021-05-18 ENCOUNTER — HOSPITAL ENCOUNTER (INPATIENT)
Facility: HOSPITAL | Age: 26
LOS: 2 days | Discharge: HOME | End: 2021-05-20
Attending: OBSTETRICS & GYNECOLOGY | Admitting: OBSTETRICS & GYNECOLOGY
Payer: COMMERCIAL

## 2021-05-18 ENCOUNTER — ANESTHESIA (INPATIENT)
Dept: LABOR AND DELIVERY | Facility: HOSPITAL | Age: 26
End: 2021-05-18
Payer: COMMERCIAL

## 2021-05-18 DIAGNOSIS — Z98.891 S/P PRIMARY LOW TRANSVERSE C-SECTION: Primary | ICD-10-CM

## 2021-05-18 DIAGNOSIS — Z3A.38 38 WEEKS GESTATION OF PREGNANCY: ICD-10-CM

## 2021-05-18 PROBLEM — Z3A.39 39 WEEKS GESTATION OF PREGNANCY: Status: ACTIVE | Noted: 2021-04-01

## 2021-05-18 LAB
ABO GROUP BLD: NORMAL
BASE EXCESS BLDCOA CALC-SCNC: -5.2 MMOL/L (ref 3–11)
BASE EXCESS BLDCOV CALC-SCNC: -4.7 MMOL/L (ref 1–9)
BLD GP AB SCN SERPL QL: NEGATIVE
ERYTHROCYTE [DISTWIDTH] IN BLOOD BY AUTOMATED COUNT: 14.8 % (ref 11.6–15.1)
HCO3 BLDCOA-SCNC: 25.7 MMOL/L (ref 17.3–27.3)
HCO3 BLDCOV-SCNC: 23.5 MMOL/L (ref 12.2–28.6)
HCT VFR BLD AUTO: 41.7 % (ref 34.8–46.1)
HGB BLD-MCNC: 13.3 G/DL (ref 11.5–15.4)
MCH RBC QN AUTO: 29.6 PG (ref 26.8–34.3)
MCHC RBC AUTO-ENTMCNC: 31.9 G/DL (ref 31.4–37.4)
MCV RBC AUTO: 93 FL (ref 82–98)
O2 CT VFR BLDCOA CALC: 7.2 ML/DL
OXYHGB MFR BLDCOA: 31.5 %
OXYHGB MFR BLDCOV: 38.8 %
PCO2 BLDCOA: 74.8 MM[HG] (ref 30–60)
PCO2 BLDCOV: 55.4 MM HG (ref 27–43)
PH BLDCOA: 7.15 [PH] (ref 7.23–7.43)
PH BLDCOV: 7.25 [PH] (ref 7.19–7.49)
PLATELET # BLD AUTO: 220 THOUSANDS/UL (ref 149–390)
PMV BLD AUTO: 10.7 FL (ref 8.9–12.7)
PO2 BLDCOA: 19.3 MM HG (ref 5–25)
PO2 BLDCOV: 18.9 MM HG (ref 15–45)
RBC # BLD AUTO: 4.5 MILLION/UL (ref 3.81–5.12)
RH BLD: POSITIVE
RPR SER QL: NORMAL
SAO2 % BLDCOV: 9.1 ML/DL
SPECIMEN EXPIRATION DATE: NORMAL
WBC # BLD AUTO: 10.81 THOUSAND/UL (ref 4.31–10.16)

## 2021-05-18 PROCEDURE — 86901 BLOOD TYPING SEROLOGIC RH(D): CPT | Performed by: OBSTETRICS & GYNECOLOGY

## 2021-05-18 PROCEDURE — 99024 POSTOP FOLLOW-UP VISIT: CPT | Performed by: OBSTETRICS & GYNECOLOGY

## 2021-05-18 PROCEDURE — NC001 PR NO CHARGE: Performed by: OBSTETRICS & GYNECOLOGY

## 2021-05-18 PROCEDURE — 82805 BLOOD GASES W/O2 SATURATION: CPT | Performed by: OBSTETRICS & GYNECOLOGY

## 2021-05-18 PROCEDURE — 94762 N-INVAS EAR/PLS OXIMTRY CONT: CPT

## 2021-05-18 PROCEDURE — 59510 CESAREAN DELIVERY: CPT | Performed by: OBSTETRICS & GYNECOLOGY

## 2021-05-18 PROCEDURE — 86850 RBC ANTIBODY SCREEN: CPT | Performed by: OBSTETRICS & GYNECOLOGY

## 2021-05-18 PROCEDURE — 85027 COMPLETE CBC AUTOMATED: CPT | Performed by: OBSTETRICS & GYNECOLOGY

## 2021-05-18 PROCEDURE — 86592 SYPHILIS TEST NON-TREP QUAL: CPT | Performed by: OBSTETRICS & GYNECOLOGY

## 2021-05-18 PROCEDURE — 86900 BLOOD TYPING SEROLOGIC ABO: CPT | Performed by: OBSTETRICS & GYNECOLOGY

## 2021-05-18 RX ORDER — OXYTOCIN/RINGER'S LACTATE 30/500 ML
62.5 PLASTIC BAG, INJECTION (ML) INTRAVENOUS CONTINUOUS
Status: ACTIVE | OUTPATIENT
Start: 2021-05-18 | End: 2021-05-18

## 2021-05-18 RX ORDER — FENTANYL CITRATE/PF 50 MCG/ML
25 SYRINGE (ML) INJECTION
Status: DISCONTINUED | OUTPATIENT
Start: 2021-05-18 | End: 2021-05-18

## 2021-05-18 RX ORDER — KETAMINE HYDROCHLORIDE 50 MG/ML
INJECTION, SOLUTION, CONCENTRATE INTRAMUSCULAR; INTRAVENOUS AS NEEDED
Status: DISCONTINUED | OUTPATIENT
Start: 2021-05-18 | End: 2021-05-18

## 2021-05-18 RX ORDER — ONDANSETRON 2 MG/ML
4 INJECTION INTRAMUSCULAR; INTRAVENOUS EVERY 4 HOURS PRN
Status: ACTIVE | OUTPATIENT
Start: 2021-05-18 | End: 2021-05-19

## 2021-05-18 RX ORDER — ACETAMINOPHEN 325 MG/1
650 TABLET ORAL EVERY 6 HOURS
Status: COMPLETED | OUTPATIENT
Start: 2021-05-18 | End: 2021-05-19

## 2021-05-18 RX ORDER — ONDANSETRON 2 MG/ML
4 INJECTION INTRAMUSCULAR; INTRAVENOUS EVERY 8 HOURS PRN
Status: DISCONTINUED | OUTPATIENT
Start: 2021-05-18 | End: 2021-05-18

## 2021-05-18 RX ORDER — HYDROMORPHONE HCL/PF 1 MG/ML
1 SYRINGE (ML) INJECTION EVERY 2 HOUR PRN
Status: DISCONTINUED | OUTPATIENT
Start: 2021-05-19 | End: 2021-05-20 | Stop reason: HOSPADM

## 2021-05-18 RX ORDER — ALBUTEROL SULFATE 2.5 MG/3ML
2.5 SOLUTION RESPIRATORY (INHALATION) ONCE AS NEEDED
Status: DISCONTINUED | OUTPATIENT
Start: 2021-05-18 | End: 2021-05-20 | Stop reason: HOSPADM

## 2021-05-18 RX ORDER — NALOXONE HYDROCHLORIDE 0.4 MG/ML
0.1 INJECTION, SOLUTION INTRAMUSCULAR; INTRAVENOUS; SUBCUTANEOUS
Status: ACTIVE | OUTPATIENT
Start: 2021-05-18 | End: 2021-05-19

## 2021-05-18 RX ORDER — DEXAMETHASONE SODIUM PHOSPHATE 4 MG/ML
8 INJECTION, SOLUTION INTRA-ARTICULAR; INTRALESIONAL; INTRAMUSCULAR; INTRAVENOUS; SOFT TISSUE ONCE AS NEEDED
Status: DISPENSED | OUTPATIENT
Start: 2021-05-18 | End: 2021-05-19

## 2021-05-18 RX ORDER — MORPHINE SULFATE 0.5 MG/ML
INJECTION, SOLUTION EPIDURAL; INTRATHECAL; INTRAVENOUS
Status: COMPLETED
Start: 2021-05-18 | End: 2021-05-18

## 2021-05-18 RX ORDER — BUPIVACAINE HYDROCHLORIDE 7.5 MG/ML
INJECTION, SOLUTION INTRASPINAL AS NEEDED
Status: DISCONTINUED | OUTPATIENT
Start: 2021-05-18 | End: 2021-05-18

## 2021-05-18 RX ORDER — CEFAZOLIN SODIUM 2 G/50ML
2000 SOLUTION INTRAVENOUS ONCE
Status: COMPLETED | OUTPATIENT
Start: 2021-05-18 | End: 2021-05-18

## 2021-05-18 RX ORDER — OXYCODONE HYDROCHLORIDE 5 MG/1
10 TABLET ORAL EVERY 4 HOURS PRN
Status: DISCONTINUED | OUTPATIENT
Start: 2021-05-19 | End: 2021-05-20 | Stop reason: HOSPADM

## 2021-05-18 RX ORDER — ONDANSETRON 2 MG/ML
4 INJECTION INTRAMUSCULAR; INTRAVENOUS EVERY 8 HOURS PRN
Status: DISCONTINUED | OUTPATIENT
Start: 2021-05-19 | End: 2021-05-20 | Stop reason: HOSPADM

## 2021-05-18 RX ORDER — OXYCODONE HYDROCHLORIDE 5 MG/1
5 TABLET ORAL EVERY 4 HOURS PRN
Status: DISCONTINUED | OUTPATIENT
Start: 2021-05-19 | End: 2021-05-20 | Stop reason: HOSPADM

## 2021-05-18 RX ORDER — KETOROLAC TROMETHAMINE 30 MG/ML
INJECTION, SOLUTION INTRAMUSCULAR; INTRAVENOUS AS NEEDED
Status: DISCONTINUED | OUTPATIENT
Start: 2021-05-18 | End: 2021-05-18

## 2021-05-18 RX ORDER — ACETAMINOPHEN 325 MG/1
650 TABLET ORAL EVERY 6 HOURS PRN
Status: DISCONTINUED | OUTPATIENT
Start: 2021-05-19 | End: 2021-05-20 | Stop reason: HOSPADM

## 2021-05-18 RX ORDER — FENTANYL CITRATE/PF 50 MCG/ML
25 SYRINGE (ML) INJECTION
Status: DISCONTINUED | OUTPATIENT
Start: 2021-05-18 | End: 2021-05-20 | Stop reason: HOSPADM

## 2021-05-18 RX ORDER — METOCLOPRAMIDE HYDROCHLORIDE 5 MG/ML
INJECTION INTRAMUSCULAR; INTRAVENOUS AS NEEDED
Status: DISCONTINUED | OUTPATIENT
Start: 2021-05-18 | End: 2021-05-18

## 2021-05-18 RX ORDER — OXYCODONE HYDROCHLORIDE 5 MG/1
5 TABLET ORAL EVERY 4 HOURS PRN
Status: DISCONTINUED | OUTPATIENT
Start: 2021-05-18 | End: 2021-05-18 | Stop reason: SDUPTHER

## 2021-05-18 RX ORDER — CALCIUM CARBONATE 200(500)MG
1000 TABLET,CHEWABLE ORAL DAILY PRN
Status: DISCONTINUED | OUTPATIENT
Start: 2021-05-18 | End: 2021-05-20 | Stop reason: HOSPADM

## 2021-05-18 RX ORDER — DIPHENHYDRAMINE HYDROCHLORIDE 50 MG/ML
25 INJECTION INTRAMUSCULAR; INTRAVENOUS EVERY 6 HOURS PRN
Status: ACTIVE | OUTPATIENT
Start: 2021-05-18 | End: 2021-05-19

## 2021-05-18 RX ORDER — OXYTOCIN/RINGER'S LACTATE 30/500 ML
PLASTIC BAG, INJECTION (ML) INTRAVENOUS CONTINUOUS PRN
Status: DISCONTINUED | OUTPATIENT
Start: 2021-05-18 | End: 2021-05-18

## 2021-05-18 RX ORDER — KETOROLAC TROMETHAMINE 30 MG/ML
30 INJECTION, SOLUTION INTRAMUSCULAR; INTRAVENOUS EVERY 6 HOURS
Status: DISPENSED | OUTPATIENT
Start: 2021-05-18 | End: 2021-05-19

## 2021-05-18 RX ORDER — KETAMINE HCL IN NACL, ISO-OSM 100MG/10ML
SYRINGE (ML) INJECTION
Status: DISPENSED
Start: 2021-05-18 | End: 2021-05-18

## 2021-05-18 RX ORDER — METOCLOPRAMIDE HYDROCHLORIDE 5 MG/ML
5 INJECTION INTRAMUSCULAR; INTRAVENOUS EVERY 6 HOURS PRN
Status: ACTIVE | OUTPATIENT
Start: 2021-05-18 | End: 2021-05-19

## 2021-05-18 RX ORDER — ONDANSETRON 2 MG/ML
INJECTION INTRAMUSCULAR; INTRAVENOUS AS NEEDED
Status: DISCONTINUED | OUTPATIENT
Start: 2021-05-18 | End: 2021-05-18

## 2021-05-18 RX ORDER — OXYCODONE HYDROCHLORIDE 5 MG/1
5 TABLET ORAL EVERY 4 HOURS PRN
Status: ACTIVE | OUTPATIENT
Start: 2021-05-18 | End: 2021-05-19

## 2021-05-18 RX ORDER — ONDANSETRON 2 MG/ML
4 INJECTION INTRAMUSCULAR; INTRAVENOUS ONCE AS NEEDED
Status: DISCONTINUED | OUTPATIENT
Start: 2021-05-18 | End: 2021-05-18 | Stop reason: HOSPADM

## 2021-05-18 RX ORDER — MIDAZOLAM HYDROCHLORIDE 2 MG/2ML
INJECTION, SOLUTION INTRAMUSCULAR; INTRAVENOUS
Status: COMPLETED
Start: 2021-05-18 | End: 2021-05-18

## 2021-05-18 RX ORDER — DOCUSATE SODIUM 100 MG/1
100 CAPSULE, LIQUID FILLED ORAL 2 TIMES DAILY
Status: DISCONTINUED | OUTPATIENT
Start: 2021-05-18 | End: 2021-05-20 | Stop reason: HOSPADM

## 2021-05-18 RX ORDER — MORPHINE SULFATE 0.5 MG/ML
INJECTION, SOLUTION EPIDURAL; INTRATHECAL; INTRAVENOUS AS NEEDED
Status: DISCONTINUED | OUTPATIENT
Start: 2021-05-18 | End: 2021-05-18

## 2021-05-18 RX ORDER — MIDAZOLAM HYDROCHLORIDE 2 MG/2ML
INJECTION, SOLUTION INTRAMUSCULAR; INTRAVENOUS AS NEEDED
Status: DISCONTINUED | OUTPATIENT
Start: 2021-05-18 | End: 2021-05-18

## 2021-05-18 RX ORDER — DIPHENHYDRAMINE HYDROCHLORIDE 50 MG/ML
25 INJECTION INTRAMUSCULAR; INTRAVENOUS EVERY 6 HOURS PRN
Status: DISCONTINUED | OUTPATIENT
Start: 2021-05-19 | End: 2021-05-20 | Stop reason: HOSPADM

## 2021-05-18 RX ORDER — IBUPROFEN 600 MG/1
600 TABLET ORAL EVERY 6 HOURS PRN
Status: DISCONTINUED | OUTPATIENT
Start: 2021-05-19 | End: 2021-05-20 | Stop reason: HOSPADM

## 2021-05-18 RX ORDER — SODIUM CHLORIDE, SODIUM LACTATE, POTASSIUM CHLORIDE, CALCIUM CHLORIDE 600; 310; 30; 20 MG/100ML; MG/100ML; MG/100ML; MG/100ML
125 INJECTION, SOLUTION INTRAVENOUS CONTINUOUS
Status: DISCONTINUED | OUTPATIENT
Start: 2021-05-18 | End: 2021-05-19

## 2021-05-18 RX ADMIN — METOCLOPRAMIDE 10 MG: 5 INJECTION, SOLUTION INTRAMUSCULAR; INTRAVENOUS at 07:41

## 2021-05-18 RX ADMIN — KETAMINE HYDROCHLORIDE 20 MG: 50 INJECTION INTRAMUSCULAR; INTRAVENOUS at 08:37

## 2021-05-18 RX ADMIN — ONDANSETRON 8 MG: 2 INJECTION INTRAMUSCULAR; INTRAVENOUS at 07:41

## 2021-05-18 RX ADMIN — MORPHINE SULFATE 1 MG: 0.5 INJECTION, SOLUTION EPIDURAL; INTRATHECAL; INTRAVENOUS at 08:46

## 2021-05-18 RX ADMIN — SODIUM CHLORIDE, SODIUM LACTATE, POTASSIUM CHLORIDE, AND CALCIUM CHLORIDE 125 ML/HR: .6; .31; .03; .02 INJECTION, SOLUTION INTRAVENOUS at 06:27

## 2021-05-18 RX ADMIN — MIDAZOLAM 2 MG: 1 INJECTION INTRAMUSCULAR; INTRAVENOUS at 08:36

## 2021-05-18 RX ADMIN — ACETAMINOPHEN 650 MG: 325 TABLET, FILM COATED ORAL at 16:24

## 2021-05-18 RX ADMIN — MORPHINE SULFATE 2.4 MG: 0.5 INJECTION, SOLUTION EPIDURAL; INTRATHECAL; INTRAVENOUS at 08:28

## 2021-05-18 RX ADMIN — KETAMINE HYDROCHLORIDE 10 MG: 50 INJECTION INTRAMUSCULAR; INTRAVENOUS at 08:40

## 2021-05-18 RX ADMIN — KETAMINE HYDROCHLORIDE 20 MG: 50 INJECTION INTRAMUSCULAR; INTRAVENOUS at 08:36

## 2021-05-18 RX ADMIN — MIDAZOLAM 2 MG: 1 INJECTION INTRAMUSCULAR; INTRAVENOUS at 08:37

## 2021-05-18 RX ADMIN — BUPIVACAINE HYDROCHLORIDE IN DEXTROSE 1.6 ML: 7.5 INJECTION, SOLUTION SUBARACHNOID at 07:58

## 2021-05-18 RX ADMIN — ACETAMINOPHEN 650 MG: 325 TABLET, FILM COATED ORAL at 22:24

## 2021-05-18 RX ADMIN — CEFAZOLIN SODIUM 2000 MG: 2 SOLUTION INTRAVENOUS at 07:40

## 2021-05-18 RX ADMIN — DEXAMETHASONE SODIUM PHOSPHATE 8 MG: 4 INJECTION, SOLUTION INTRAMUSCULAR; INTRAVENOUS at 13:58

## 2021-05-18 RX ADMIN — KETOROLAC TROMETHAMINE 30 MG: 30 INJECTION, SOLUTION INTRAMUSCULAR; INTRAVENOUS at 22:23

## 2021-05-18 RX ADMIN — KETOROLAC TROMETHAMINE 30 MG: 30 INJECTION, SOLUTION INTRAMUSCULAR; INTRAVENOUS at 16:24

## 2021-05-18 RX ADMIN — Medication 62.5 MILLI-UNITS/MIN: at 09:38

## 2021-05-18 RX ADMIN — ACETAMINOPHEN 650 MG: 325 TABLET, FILM COATED ORAL at 09:48

## 2021-05-18 RX ADMIN — DOCUSATE SODIUM 100 MG: 100 CAPSULE ORAL at 17:25

## 2021-05-18 RX ADMIN — KETOROLAC TROMETHAMINE 30 MG: 30 INJECTION, SOLUTION INTRAMUSCULAR at 08:41

## 2021-05-18 RX ADMIN — SODIUM CHLORIDE, SODIUM LACTATE, POTASSIUM CHLORIDE, AND CALCIUM CHLORIDE 125 ML/HR: .6; .31; .03; .02 INJECTION, SOLUTION INTRAVENOUS at 20:14

## 2021-05-18 RX ADMIN — MORPHINE SULFATE 0.1 MG: 0.5 INJECTION, SOLUTION EPIDURAL; INTRATHECAL; INTRAVENOUS at 07:58

## 2021-05-18 RX ADMIN — SODIUM CHLORIDE, SODIUM LACTATE, POTASSIUM CHLORIDE, AND CALCIUM CHLORIDE: .6; .31; .03; .02 INJECTION, SOLUTION INTRAVENOUS at 08:01

## 2021-05-18 RX ADMIN — Medication 250 MILLI-UNITS/MIN: at 08:22

## 2021-05-18 NOTE — DISCHARGE INSTRUCTIONS
Section   WHAT YOU SHOULD KNOW:   A  delivery, or , is abdominal surgery to deliver your baby  There are many reasons you may need a   · A  may be scheduled before labor if you had a  with your last baby  It may be scheduled if your baby is not positioned normally, or you are pregnant with more than 1 baby  · Your caregiver may perform an emergency  during labor to prevent life-threatening complications for you or your baby  A  may be done if your cervix does not dilate after several hours of active labor  · Other reasons for a  include maternal infections and problems with the placenta  AFTER YOU LEAVE:   Medicines:   · Prescription pain medicine  may be given  Ask how to take this medicine safely  · Acetaminophen  decreases pain and fever  It is available without a doctor's order  Ask how much to take and how often to take it  Follow directions  Acetaminophen can cause liver damage if not taken correctly  · NSAIDs  help decrease swelling and pain or fever  This medicine is available with or without a doctor's order  NSAIDs can cause stomach bleeding or kidney problems in certain people  If you take blood thinner medicine, always ask your obstetrician if NSAIDs are safe for you  Always read the medicine label and follow directions  · Take your medicine as directed  Contact your obstetrician (OB) if you think your medicine is not helping or if you have side effects  Tell him if you are allergic to any medicine  Keep a list of the medicines, vitamins, and herbs you take  Include the amounts, and when and why you take them  Bring the list or the pill bottles to follow-up visits  Carry your medicine list with you in case of an emergency  Follow up with your OB as directed: You may need to return to have your stitches or staples removed  Write down your questions so you remember to ask them during your visits    Wound care:  Carefully wash your wound with soap and water every day  Keep your wound clean and dry  Wear loose, comfortable clothes that do not rub against your wound  Ask your OB about bathing and showering  Drink plenty of liquids: You can lower your risk for a blood clot if you drink plenty of liquids  Ask how much liquid to drink each day and which liquids are best for you  Limit activity until you have fully recovered from surgery:   · Ask when it is safe for you to drive, walk up stairs, lift heavy objects, and have sex  · Ask when it is okay to exercise, and what types of exercise to do  Start slowly and do more as you get stronger  Contact your OB if:   · You have heavy vaginal bleeding that fills 1 or more sanitary pads in 1 hour  · You have a fever  · Your incision is swollen, red, or draining pus  · You have questions or concerns about yourself or your baby  Seek care immediately or call 911 if:   · Blood soaks through your bandage  · Your stitches come apart  · You feel lightheaded, short of breath, and have chest pain  · You cough up blood  · Your arm or leg feels warm, tender, and painful  It may look swollen and red  © 2014 [address] is for End User's use only and may not be sold, redistributed or otherwise used for commercial purposes  All illustrations and images included in CareNotes® are the copyrighted property of A D A M , Inc  or Jong Liriano  The above information is an  only  It is not intended as medical advice for individual conditions or treatments  Talk to your doctor, nurse or pharmacist before following any medical regimen to see if it is safe and effective for you

## 2021-05-18 NOTE — PLAN OF CARE
Problem: Knowledge Deficit  Goal: Verbalizes understanding of labor plan  Description: Assess patient/family/caregiver's baseline knowledge level and ability to understand information  Provide education via patient/family/caregiver's preferred learning method at appropriate level of understanding  1  Provide teaching at level of understanding  2  Provide teaching via preferred learning method(s)  Outcome: Completed  Goal: Patient/family/caregiver demonstrates understanding of disease process, treatment plan, medications, and discharge instructions  Description: Complete learning assessment and assess knowledge base  Interventions:  - Provide teaching at level of understanding  - Provide teaching via preferred learning methods  Outcome: Completed     Problem: Labor & Delivery  Goal: Manages discomfort  Description: Assess and monitor for signs and symptoms of discomfort  Assess patient's pain level regularly and per hospital policy  Administer medications as ordered  Support use of nonpharmacological methods to help control pain such as distraction, imagery, relaxation, and application of heat and cold  Collaborate with interdisciplinary team and patient to determine appropriate pain management plan  1  Include patient in decisions related to comfort  2  Offer non-pharmacological pain management interventions  3  Report ineffective pain management to physician  Outcome: Completed  Goal: Patient vital signs are stable  Description: 1  Assess vital signs - vaginal delivery    Outcome: Completed     Problem: PAIN - ADULT  Goal: Verbalizes/displays adequate comfort level or baseline comfort level  Description: Interventions:  - Encourage patient to monitor pain and request assistance  - Assess pain using appropriate pain scale  - Administer analgesics based on type and severity of pain and evaluate response  - Implement non-pharmacological measures as appropriate and evaluate response  - Consider cultural and social influences on pain and pain management  - Notify physician/advanced practitioner if interventions unsuccessful or patient reports new pain  Outcome: Progressing     Problem: INFECTION - ADULT  Goal: Absence or prevention of progression during hospitalization  Description: INTERVENTIONS:  - Assess and monitor for signs and symptoms of infection  - Monitor lab/diagnostic results  - Monitor all insertion sites, i e  indwelling lines, tubes, and drains  - Monitor endotracheal if appropriate and nasal secretions for changes in amount and color  - [place] appropriate cooling/warming therapies per order  - Administer medications as ordered  - Instruct and encourage patient and family to use good hand hygiene technique  - Identify and instruct in appropriate isolation precautions for identified infection/condition  Outcome: Progressing  Goal: Absence of fever/infection during neutropenic period  Description: INTERVENTIONS:  - Monitor WBC    Outcome: Progressing     Problem: SAFETY ADULT  Goal: Patient will remain free of falls  Description: INTERVENTIONS:  - Assess patient frequently for physical needs  -  Identify cognitive and physical deficits and behaviors that affect risk of falls    -  [place] fall precautions as indicated by assessment   - Educate patient/family on patient safety including physical limitations  - Instruct patient to call for assistance with activity based on assessment  - Modify environment to reduce risk of injury  - Consider OT/PT consult to assist with strengthening/mobility  Outcome: Progressing  Goal: Maintain or return to baseline ADL function  Description: INTERVENTIONS:  -  Assess patient's ability to carry out ADLs; assess patient's baseline for ADL function and identify physical deficits which impact ability to perform ADLs (bathing, care of mouth/teeth, toileting, grooming, dressing, etc )  - Assess/evaluate cause of self-care deficits   - Assess range of motion  - Assess patient's mobility; develop plan if impaired  - Assess patient's need for assistive devices and provide as appropriate  - Encourage maximum independence but intervene and supervise when necessary  - Involve family in performance of ADLs  - Assess for home care needs following discharge   - Consider OT consult to assist with ADL evaluation and planning for discharge  - Provide patient education as appropriate  Outcome: Progressing  Goal: Maintain or return mobility status to optimal level  Description: INTERVENTIONS:  - Assess patient's baseline mobility status (ambulation, transfers, stairs, etc )    - Identify cognitive and physical deficits and behaviors that affect mobility  - Identify mobility aids required to assist with transfers and/or ambulation (gait belt, sit-to-stand, lift, walker, cane, etc )  - [place] fall precautions as indicated by assessment  - Record patient progress and toleration of activity level on Mobility SBAR; progress patient to next Phase/Stage  - Instruct patient to call for assistance with activity based on assessment  - Consider rehabilitation consult to assist with strengthening/weightbearing, etc   Outcome: Progressing     Problem: DISCHARGE PLANNING  Goal: Discharge to home or other facility with appropriate resources  Description: INTERVENTIONS:  - Identify barriers to discharge w/patient and caregiver  - Arrange for needed discharge resources and transportation as appropriate  - Identify discharge learning needs (meds, wound care, etc )  - Arrange for interpretive services to assist at discharge as needed  - Refer to Case Management Department for coordinating discharge planning if the patient needs post-hospital services based on physician/advanced practitioner order or complex needs related to functional status, cognitive ability, or social support system  Outcome: Progressing     Problem: Knowledge Deficit  Goal: Patient/family/caregiver demonstrates understanding of disease process, treatment plan, medications, and discharge instructions  Description: Complete learning assessment and assess knowledge base  Interventions:  - Provide teaching at level of understanding  - Provide teaching via preferred learning methods  Outcome: Progressing     Problem: Potential for Falls  Goal: Patient will remain free of falls  Description: INTERVENTIONS:  - Assess patient frequently for physical needs  -  Identify cognitive and physical deficits and behaviors that affect risk of falls    -  [place] fall precautions as indicated by assessment   - Educate patient/family on patient safety including physical limitations  - Instruct patient to call for assistance with activity based on assessment  - Modify environment to reduce risk of injury  - Consider OT/PT consult to assist with strengthening/mobility  Outcome: Progressing     Problem: POSTPARTUM  Goal: Experiences normal postpartum course  Description: INTERVENTIONS:  - Monitor maternal vital signs  - Assess uterine involution and lochia  Outcome: Progressing  Goal: Appropriate maternal -  bonding  Description: INTERVENTIONS:  - Identify family support  - Assess for appropriate maternal/infant bonding   -Encourage maternal/infant bonding opportunities  - Referral to  or  as needed  Outcome: Progressing  Goal: Establishment of infant feeding pattern  Description: INTERVENTIONS:  - Assess breast/bottle feeding  - Refer to lactation as needed  Outcome: Progressing  Goal: Incision(s), wounds(s) or drain site(s) healing without S/S of infection  Description: INTERVENTIONS  - Assess and document risk factors for skin impairment   - Assess and document dressing, incision, wound bed, drain sites and surrounding tissue  - Consider nutrition services referral as needed  - Oral mucous membranes remain intact  - Provide patient/ family education  Outcome: Progressing     Problem: ALTERATION IN THE BREAST  Goal: Optimize infant feeding at the breast  Description: INTERVENTIONS:  - Latch, breast and nipple assessment  - Assess prior breast feeding history  - Hand expression of breast milk  - For cracked, bleeding and or sore nipples reassess latch, treat damaged nipple  -Educate mother on feeding cues  -Positioning/latch techniques  Outcome: Progressing     Problem: INADEQUATE LATCH, SUCK OR SWALLOW  Goal: Demonstrate ability to latch and sustain latch, audible swallowing and satiety  Description: INTERVENTIONS:  - Assess oral anatomy, notify Physician/AP for abnormal findings  - Establish milk expression  - Maximize feeding opportunity (skin to skin, behavioral state)  - Position/latch techniques  - Discourage use of pacifier-artificial nipple  - Mechanical pumping  - Nipple Shield  - Supplemental formula feeding (Physician/AP order)  - Alternative feeding method  Outcome: Progressing

## 2021-05-18 NOTE — H&P
H&P Exam - Obstetrics   Anthony Flores 23 y o  female MRN: [id number]  Unit/Bed#: L&D 329-01 Encounter: [id number]      History of Present Illness     Chief Complaint: Elective  Section, Primary    HPI:  Anthony Flores is a 23 y o   female with an ZENA of 2021, by Ultrasound at 39w1d weeks gestation who is being admitted for primary low transverse  section for breech presentation  Contractions: some last night  Loss of fluid: denies  Vaginal bleeding: denies  Fetal movement: good    She is [facility] patient  PREGNANCY COMPLICATIONS:   1) Arcuate uterus  2) Breech presentation  3) Diet controlled gestational diabetes    OB History    Para Term  AB Living   1 0 0 0 0 0   SAB TAB Ectopic Multiple Live Births   0 0 0 0 0      # Outcome Date GA Lbr Rory/2nd Weight Sex Delivery Anes PTL Lv   1 Current              Historical Information   Past Medical History:   Diagnosis Date    Anxiety     Asthma     as a child    Depression     Diet controlled gestational diabetes mellitus (GDM) in third trimester 3/3/2021    Urinary tract infection      No past surgical history on file    Social History   Social History     Substance and Sexual Activity   Alcohol Use No     Social History     Substance and Sexual Activity   Drug Use Never     Social History     Tobacco Use   Smoking Status Never Smoker   Smokeless Tobacco Never Used     Meds/Allergies      Medications Prior to Admission   Medication    aspirin (ECOTRIN LOW STRENGTH) 81 mg EC tablet    Blood Glucose Monitoring Suppl (OneTouch Verio Flex System) w/Device KIT    Blood Glucose Monitoring Suppl (OneTouch Verio) w/Device KIT    cholecalciferol (VITAMIN D3) 1,000 units tablet    magnesium oxide (MAG-OX) 400 mg    OneTouch Delica Lancets 78C MISC    OneTouch Verio test strip    Prenatal Vit-Fe Fumarate-FA (PRENATAL 19 PO)    Riboflavin 400 MG CAPS      No Known Allergies    OBJECTIVE:    Vitals: Blood pressure 145/81, pulse 91, temperature 97 8 °F (36 6 °C), temperature source Oral, resp  rate 18, height 5' 3" (1 6 m), weight 111 kg (243 lb 15 7 oz), last menstrual period 08/08/2020, SpO2 99 %, not currently breastfeeding  Body mass index is 43 22 kg/m²  Physical Exam  Constitutional:       General: She is not in acute distress  Appearance: She is well-developed  She is not diaphoretic  HENT:      Head: Normocephalic and atraumatic  Cardiovascular:      Rate and Rhythm: Normal rate and regular rhythm  Heart sounds: Normal heart sounds  No murmur  No friction rub  No gallop  Pulmonary:      Effort: Pulmonary effort is normal  No respiratory distress  Breath sounds: Normal breath sounds  No wheezing or rales  Abdominal:      General: There is no distension  Palpations: Abdomen is soft  Tenderness: There is no abdominal tenderness  There is no guarding or rebound  Genitourinary:     Comments: Gravid uterus, nontender  Skin:     General: Skin is warm and dry  Neurological:      Mental Status: She is alert and oriented to person, place, and time  Psychiatric:         Behavior: Behavior normal          Thought Content:  Thought content normal          Judgment: Judgment normal        Fetal heart rate:    130/ moderate/ accelerations/ no decelerations    Moody:    None    EFW: 8lbs (93rd percentile on 5/4/21)    GBS: negative    Prenatal Labs:   Blood Type:   Lab Results   Component Value Date/Time    ABO Grouping A 11/06/2020 10:48 AM     , D (Rh type):   Lab Results   Component Value Date/Time    Rh Factor Positive 11/06/2020 10:48 AM     , 1 hour Glucola:   Lab Results   Component Value Date/Time    Glucose 175 (H) 01/07/2021 11:04 AM   , Rubella:   Lab Results   Component Value Date/Time    Rubella IgG Quant 45 3 11/06/2020 10:48 AM        , VDRL/RPR:   Lab Results   Component Value Date/Time    RPR Non-Reactive 11/06/2020 10:48 AM      , Hep B:   Lab Results   Component Value Date/Time    Hepatitis B Surface Ag Non-reactive 2020 10:48 AM     , HIV:   Lab Results   Component Value Date/Time    HIV-1/HIV-2 Ab Non-Reactive 2020 10:48 AM         Invasive Devices     Peripheral Intravenous Line            Peripheral IV 21 Left Forearm less than 1 day                  Assessment/Plan     ASSESSMENT:  25yo  at 39w1d weeks gestation who is being admitted for primary low transverse  section due to breech presentation  PLAN:   1) Admit   2) CBC, RPR, Blood Type   3) Proceed to the OR as scheduled      This patient will be an INPATIENT  and I certify the anticipated length of stay is >2 Midnights  Hubert Gabriel MD  2021  6:38 AM

## 2021-05-18 NOTE — ANESTHESIA PROCEDURE NOTES
Spinal Block    Patient location during procedure: OB  Start time: 5/18/2021 7:58 AM  Reason for block: primary anesthetic  Staffing  Anesthesiologist: Socorro Gonzalez DO  Performed: anesthesiologist   Preanesthetic Checklist  Completed: patient identified, site marked, surgical consent, pre-op evaluation, timeout performed, IV checked, risks and benefits discussed and monitors and equipment checked  Spinal Block  Patient position: sitting  Prep: Betadine  Patient monitoring: frequent blood pressure checks and continuous pulse ox  Approach: midline  Location: L2-3  Injection technique: single-shot  Needle  Needle type: pencil-tip   Needle gauge: 27 G  Needle length: 10 cm  Assessment  Injection Assessment:  negative aspiration for heme, no paresthesia on injection and positive aspiration for clear CSF    Post-procedure:  site cleaned  Additional Notes  Difficult placement, Dr Catherine Collins in to assist

## 2021-05-18 NOTE — OP NOTE
Section Procedure Note    Indications:   Breech presentation    Pre-operative Diagnosis:   Patient Active Problem List   Diagnosis    Vitamin D deficiency    Obesity in pregnancy    Encounter for supervision of normal first pregnancy in third trimester    Diet controlled gestational diabetes mellitus (GDM) in third trimester    Maternal obesity, antepartum, third trimester    39 weeks gestation of pregnancy    Arcuate uterus    Breech presentation     Post-operative Diagnosis:   Patient Active Problem List   Diagnosis    Vitamin D deficiency    Obesity in pregnancy    Encounter for supervision of normal first pregnancy in third trimester    Diet controlled gestational diabetes mellitus (GDM) in third trimester    Maternal obesity, antepartum, third trimester    39 weeks gestation of pregnancy    Arcuate uterus    Breech presentation    S/P primary low transverse        Attending: Isidro Diaz MD  Resident: Stefania Clark MD    Maternal Findings:  Normal uterus - mildly arcuate   Normal tubes and ovaries bilaterally  No adhesions  No difficulty noted from skin to delivery     Findings:  Viable female weighing 8lbs 15 2oz;  Apgar scores of 8 at one minute and 9 at five minutes     Clear amniotic fluid  Normal placenta with 3-vessel cord    Arterial and Venous Gases:  Umbilical Cord Venous Blood Gas:  Results from last 7 days   Lab Units 21  0823   PH COV  7 246   PCO2 COV mm HG 55 4*   HCO3 COV mmol/L 23 5   BASE EXC COV mmol/L -4 7*   O2 CT CD VB mL/dL 9 1   O2 HGB, VENOUS CORD % 28 7     Umbilical Cord Arterial Blood Gas:  Results from last 7 days   Lab Units 21  0823   PH COA  7 154*   PCO2 COA  74 8*   PO2 COA mm HG 19 3   HCO3 COA mmol/L 25 7   BASE EXC COA mmol/L -5 2*   O2 CONTENT CORD ART ml/dl 7 2   O2 HGB, ARTERIAL CORD % 31 5       Specimens: Arterial and venous cord gases, cord blood, segment of umbilical cord, placenta to storage    Quantitative Blood Loss: 474 mL    Drains: Bro catheter           Complications:  None; patient tolerated the procedure well  Disposition: postpartum           Condition: stable    Brief Labor Course:   Patient was admitted for primary low-transverse  section due to breech presentation  Procedure Details   The patient was seen prior to the procedure  Risks, benefits, possible complications, alternate treatment options, and expected outcomes were discussed with the patient  The patient agreed with the proposed plan and gave informed consent for a primary low-transverse  section  The patient was taken to the [facility] Operating Room where she received spinal anesthesia  For infection prophylaxis, she received Ancef 2g preoperatively  Fetal heart tones in the OR were assessed and noted to be within normal limits and a Bro catheter and SCDs were placed  The abdomen was prepped with Chloraprep, the vagina was prepped with Betadine, and following appropriate drying time, the patient was draped in the usual sterile manner  A Time Out was held and the above information confirmed  The patient was identified as Torie Borne and the procedure verified as a  Delivery  A Pfannenstiel incision was made and carried down through the underlying subcutaneous tissue to the fascia using a scalpel  The rectus fascia was then nicked in the midline and dissected laterally using Smith scissors  The superior edge of the  fascial incision was grasped with Kocher clamps bilaterally, tented upward and the underlying rectus muscles were dissected off sharply with Smith scissors  This was repeated on the inferior edge of the fascia and dissected down to the pubic rami  The rectus muscles were  and the peritoneum was identified, entered, and extended longitudinally with blunt dissection  The bladder blade was inserted    A low transverse uterine incision was made with the scalpel and extended laterally with blunt dissection  The amnion was entered bluntly  The fetal breech was palpated, elevated and was unable to be delivered through the incision  Penard maneuvers were utilized to delivery the fetal legs  Fetal feet were grasped and brought through the hysterotomy without difficulty  Fetal body was delivered to the level of the scapula and arms were swept midline and delivered  Fetal vertex was flexed and delivered through the hysterotomy  There was noted to be spontaneous cry and good tone  There was no apparent injury to the   The umbilical cord was doubly clamped and cut  The infant was handed off to the  providers  There was noted to be spontaneous cry and good tone  Arterial and venous cord gases, cord blood, and a segment of umbilical cord were obtained for evaluation  The placenta delivered spontaneously with uterine fundal massage and appeared normal  The uterus was exteriorized and cleaned out with a moist lap sponge  The uterine incision was closed with a running locked suture of 0 Vicryl  A second layer of the same suture was used to imbricate the first   Hemostasis was noted to be excellent  Posterior culdesac was suctioned  The uterus was returned to the abdomen  The paracolic gutters were inspected and cleared of all clots and debris with moist lap sponges  The muscle was reapproximated with 2 mattress sutures of 0 Chromic  The fascia was closed with a running suture of 0 Vicryl  Subcutaneous adipose tissue was closed with a running suture of 2-0 Vicryl  The skin was closed with a subcuticular running suture of 4-0 Monocryl  Exofin was applied  The patient appeared to tolerate the procedure very well  Lap sponge, needle, and instrument counts were correct x2  The patient was transferred to her postpartum recovery room in stable condition and her infant went to the  nursery      Attending Attestation: Dr Sourav Nix MD was present for the entire procedure  Kenya Fisher MD  OB/GYN  5/18/2021  9:24 AM  Pager: [phone]

## 2021-05-18 NOTE — DISCHARGE SUMMARY
CS Discharge Summary - Samantha Pearson 23 y o  female MRN: [id number]    Unit/Bed#: L&D 329-01 Encounter: [id number]    Admission Date: 2021     Discharge Date: 21    Admitting Diagnosis:   Patient Active Problem List   Diagnosis    Vitamin D deficiency    Obesity in pregnancy    Encounter for supervision of normal first pregnancy in third trimester    Diet controlled gestational diabetes mellitus (GDM) in third trimester    Maternal obesity, antepartum, third trimester    39 weeks gestation of pregnancy    Arcuate uterus    Breech presentation     Discharge Diagnosis:   Same, delivered    Procedures:   primary  section, low transverse incision    Admitting Attending: Dr Dinh Gomez  Delivery Attending: Dr Dinh Gomez  Discharge Attending: Dr Paul Hanna service: none  Consult attending: none    Hospital Course:     Samantha Pearson is a 23 y o   who was admitted for primary low transverse  section due to breech presentation  She then underwent an uncomplicated  section delivery and delivered a viable female  on 21 at [phone]  APGARS were 8, 9 at 1 and 5 minutes, respectively   weighed 8lb 15 2oz   was then transferred to  nursery  Patient tolerated the procedure well and was transferred to recovery in stable condition  The patient's post partum course was unremarkable    Preoperative hemaglobin was 13 3, postoperative was 12 1  Her postoperative pain was well controlled with oral analgesics  On day of discharge, she was ambulating and able to reasonably perform all ADLs  She was voiding and had appropriate bowel function  Pain was well controlled  She was discharged home on post-operative day #2 without complications  Patient was instructed to follow up with her OB as an outpatient and was given appropriate warnings to call provider if she develops signs of infection or uncontrolled pain       Complications: None    Condition at discharge:   good     Provisions for Follow-Up Care:  See after visit summary for information related to follow-up care and any pertinent home health orders  Disposition:   Home    Planned Readmission:   No    Discharge Medications:   Prenatal vitamin daily for 6 months or the duration of nursing whichever is longer  Motrin 600 mg orally every 6 hours as needed for pain  Tylenol (over the counter) per bottle directions as needed for pain  Hydrocortisone cream 1% (over the counter) applied 1-2x daily to hemorrhoids as needed  Witch hazel pads for hemorrhoidal discomfort as needed      Discharge instructions :   -Do not place anything (no partner, tampons or douche) in your vagina for 6 weeks  -You may walk for exercise for the first 6 weeks then gradually return to your usual activities    -Please do not drive for 1 week if you have no stitches and for 2 weeks if you have stitches or underwent a  delivery     -You may take baths or shower per your preference    -Please look at your bust (breasts) in the mirror daily and call provider for redness or tenderness or increased warmth  - If you have had a  please look at your incision daily as well and call provider for increasing redness or steady drainage from the incision    -Please call your provider if temperature > 100 4*F or 38* C, worsening pain or a foul discharge      Will follow up in 1 week for post-operative visit

## 2021-05-18 NOTE — ANESTHESIA PREPROCEDURE EVALUATION
Procedure:   SECTION () (N/A Uterus)    Relevant Problems   GYN   (+) 39 weeks gestation of pregnancy   (+) Encounter for supervision of normal first pregnancy in third trimester      Other   (+) Breech presentation   (+) Diet controlled gestational diabetes mellitus (GDM) in third trimester   (+) Obesity in pregnancy        Physical Exam    Airway    Mallampati score: II  TM Distance: >3 FB       Dental       Cardiovascular  Cardiovascular exam normal    Pulmonary  Pulmonary exam normal     Other Findings        Anesthesia Plan  ASA Score- 2     Anesthesia Type- spinal with ASA Monitors  Additional Monitors:   Airway Plan:           Plan Factors-    Chart reviewed  Patient is not a current smoker  Patient instructed to abstain from smoking on day of procedure  Patient did not smoke on day of surgery  Obstructive sleep apnea risk education given perioperatively  Induction-     Postoperative Plan-     Informed Consent- Anesthetic plan and risks discussed with patient

## 2021-05-18 NOTE — ANESTHESIA POSTPROCEDURE EVALUATION
Post-Op Assessment Note    CV Status:  Stable    Pain management: adequate     Mental Status:  Alert   PONV Controlled:  None   Airway Patency:  Patent      Post Op Vitals Reviewed: Yes      Staff: Anesthesiologist       Blood pressure 127/69, pulse 81, temperature 97 7 °F (36 5 °C), temperature source Oral, resp  rate 18, height 5' 3" (1 6 m), weight 111 kg (243 lb 15 7 oz), last menstrual period 08/08/2020, SpO2 96 %, not currently breastfeeding  No complications documented      BP      Temp      Pulse     Resp      SpO2

## 2021-05-18 NOTE — PLAN OF CARE
Problem: Knowledge Deficit  Goal: Verbalizes understanding of labor plan  Description: Assess patient/family/caregiver's baseline knowledge level and ability to understand information  Provide education via patient/family/caregiver's preferred learning method at appropriate level of understanding  1  Provide teaching at level of understanding  2  Provide teaching via preferred learning method(s)  Outcome: Progressing  Goal: Patient/family/caregiver demonstrates understanding of disease process, treatment plan, medications, and discharge instructions  Description: Complete learning assessment and assess knowledge base  Interventions:  - Provide teaching at level of understanding  - Provide teaching via preferred learning methods  Outcome: Progressing     Problem: Labor & Delivery  Goal: Manages discomfort  Description: Assess and monitor for signs and symptoms of discomfort  Assess patient's pain level regularly and per hospital policy  Administer medications as ordered  Support use of nonpharmacological methods to help control pain such as distraction, imagery, relaxation, and application of heat and cold  Collaborate with interdisciplinary team and patient to determine appropriate pain management plan  1  Include patient in decisions related to comfort  2  Offer non-pharmacological pain management interventions  3  Report ineffective pain management to physician  Outcome: Progressing  Goal: Patient vital signs are stable  Description: 1  Assess vital signs - vaginal delivery    Outcome: Progressing     Problem: PAIN - ADULT  Goal: Verbalizes/displays adequate comfort level or baseline comfort level  Description: Interventions:  - Encourage patient to monitor pain and request assistance  - Assess pain using appropriate pain scale  - Administer analgesics based on type and severity of pain and evaluate response  - Implement non-pharmacological measures as appropriate and evaluate response  - Consider cultural and social influences on pain and pain management  - Notify physician/advanced practitioner if interventions unsuccessful or patient reports new pain  Outcome: Progressing     Problem: INFECTION - ADULT  Goal: Absence or prevention of progression during hospitalization  Description: INTERVENTIONS:  - Assess and monitor for signs and symptoms of infection  - Monitor lab/diagnostic results  - Monitor all insertion sites, i e  indwelling lines, tubes, and drains  - Monitor endotracheal if appropriate and nasal secretions for changes in amount and color  - [place] appropriate cooling/warming therapies per order  - Administer medications as ordered  - Instruct and encourage patient and family to use good hand hygiene technique  - Identify and instruct in appropriate isolation precautions for identified infection/condition  Outcome: Progressing  Goal: Absence of fever/infection during neutropenic period  Description: INTERVENTIONS:  - Monitor WBC    Outcome: Progressing     Problem: SAFETY ADULT  Goal: Patient will remain free of falls  Description: INTERVENTIONS:  - Assess patient frequently for physical needs  -  Identify cognitive and physical deficits and behaviors that affect risk of falls    -  [place] fall precautions as indicated by assessment   - Educate patient/family on patient safety including physical limitations  - Instruct patient to call for assistance with activity based on assessment  - Modify environment to reduce risk of injury  - Consider OT/PT consult to assist with strengthening/mobility  Outcome: Progressing  Goal: Maintain or return to baseline ADL function  Description: INTERVENTIONS:  -  Assess patient's ability to carry out ADLs; assess patient's baseline for ADL function and identify physical deficits which impact ability to perform ADLs (bathing, care of mouth/teeth, toileting, grooming, dressing, etc )  - Assess/evaluate cause of self-care deficits   - Assess range of motion  - Assess patient's mobility; develop plan if impaired  - Assess patient's need for assistive devices and provide as appropriate  - Encourage maximum independence but intervene and supervise when necessary  - Involve family in performance of ADLs  - Assess for home care needs following discharge   - Consider OT consult to assist with ADL evaluation and planning for discharge  - Provide patient education as appropriate  Outcome: Progressing  Goal: Maintain or return mobility status to optimal level  Description: INTERVENTIONS:  - Assess patient's baseline mobility status (ambulation, transfers, stairs, etc )    - Identify cognitive and physical deficits and behaviors that affect mobility  - Identify mobility aids required to assist with transfers and/or ambulation (gait belt, sit-to-stand, lift, walker, cane, etc )  - [place] fall precautions as indicated by assessment  - Record patient progress and toleration of activity level on Mobility SBAR; progress patient to next Phase/Stage  - Instruct patient to call for assistance with activity based on assessment  - Consider rehabilitation consult to assist with strengthening/weightbearing, etc   Outcome: Progressing     Problem: DISCHARGE PLANNING  Goal: Discharge to home or other facility with appropriate resources  Description: INTERVENTIONS:  - Identify barriers to discharge w/patient and caregiver  - Arrange for needed discharge resources and transportation as appropriate  - Identify discharge learning needs (meds, wound care, etc )  - Arrange for interpretive services to assist at discharge as needed  - Refer to Case Management Department for coordinating discharge planning if the patient needs post-hospital services based on physician/advanced practitioner order or complex needs related to functional status, cognitive ability, or social support system  Outcome: Progressing

## 2021-05-19 LAB
ERYTHROCYTE [DISTWIDTH] IN BLOOD BY AUTOMATED COUNT: 14.8 % (ref 11.6–15.1)
HCT VFR BLD AUTO: 37.3 % (ref 34.8–46.1)
HGB BLD-MCNC: 12.1 G/DL (ref 11.5–15.4)
MCH RBC QN AUTO: 29.3 PG (ref 26.8–34.3)
MCHC RBC AUTO-ENTMCNC: 32.4 G/DL (ref 31.4–37.4)
MCV RBC AUTO: 90 FL (ref 82–98)
PLATELET # BLD AUTO: 196 THOUSANDS/UL (ref 149–390)
PMV BLD AUTO: 10.5 FL (ref 8.9–12.7)
RBC # BLD AUTO: 4.13 MILLION/UL (ref 3.81–5.12)
WBC # BLD AUTO: 16.55 THOUSAND/UL (ref 4.31–10.16)

## 2021-05-19 PROCEDURE — 99024 POSTOP FOLLOW-UP VISIT: CPT | Performed by: OBSTETRICS & GYNECOLOGY

## 2021-05-19 PROCEDURE — 85027 COMPLETE CBC AUTOMATED: CPT | Performed by: OBSTETRICS & GYNECOLOGY

## 2021-05-19 RX ADMIN — DOCUSATE SODIUM 100 MG: 100 CAPSULE ORAL at 17:41

## 2021-05-19 RX ADMIN — OXYCODONE HYDROCHLORIDE 10 MG: 5 TABLET ORAL at 15:56

## 2021-05-19 RX ADMIN — DOCUSATE SODIUM 100 MG: 100 CAPSULE ORAL at 08:25

## 2021-05-19 RX ADMIN — ACETAMINOPHEN 650 MG: 325 TABLET, FILM COATED ORAL at 03:56

## 2021-05-19 RX ADMIN — IBUPROFEN 600 MG: 600 TABLET ORAL at 17:41

## 2021-05-19 RX ADMIN — KETOROLAC TROMETHAMINE 30 MG: 30 INJECTION, SOLUTION INTRAMUSCULAR; INTRAVENOUS at 03:56

## 2021-05-19 RX ADMIN — ENOXAPARIN SODIUM 40 MG: 40 INJECTION SUBCUTANEOUS at 08:25

## 2021-05-19 NOTE — PLAN OF CARE
Problem: PAIN - ADULT  Goal: Verbalizes/displays adequate comfort level or baseline comfort level  Description: Interventions:  - Encourage patient to monitor pain and request assistance  - Assess pain using appropriate pain scale  - Administer analgesics based on type and severity of pain and evaluate response  - Implement non-pharmacological measures as appropriate and evaluate response  - Consider cultural and social influences on pain and pain management  - Notify physician/advanced practitioner if interventions unsuccessful or patient reports new pain  Outcome: Progressing     Problem: INFECTION - ADULT  Goal: Absence or prevention of progression during hospitalization  Description: INTERVENTIONS:  - Assess and monitor for signs and symptoms of infection  - Monitor lab/diagnostic results  - Monitor all insertion sites, i e  indwelling lines, tubes, and drains  - Monitor endotracheal if appropriate and nasal secretions for changes in amount and color  - [place] appropriate cooling/warming therapies per order  - Administer medications as ordered  - Instruct and encourage patient and family to use good hand hygiene technique  - Identify and instruct in appropriate isolation precautions for identified infection/condition  Outcome: Progressing  Goal: Absence of fever/infection during neutropenic period  Description: INTERVENTIONS:  - Monitor WBC    Outcome: Progressing     Problem: SAFETY ADULT  Goal: Patient will remain free of falls  Description: INTERVENTIONS:  - Assess patient frequently for physical needs  -  Identify cognitive and physical deficits and behaviors that affect risk of falls    -  [place] fall precautions as indicated by assessment   - Educate patient/family on patient safety including physical limitations  - Instruct patient to call for assistance with activity based on assessment  - Modify environment to reduce risk of injury  - Consider OT/PT consult to assist with strengthening/mobility  Outcome: Progressing  Goal: Maintain or return to baseline ADL function  Description: INTERVENTIONS:  -  Assess patient's ability to carry out ADLs; assess patient's baseline for ADL function and identify physical deficits which impact ability to perform ADLs (bathing, care of mouth/teeth, toileting, grooming, dressing, etc )  - Assess/evaluate cause of self-care deficits   - Assess range of motion  - Assess patient's mobility; develop plan if impaired  - Assess patient's need for assistive devices and provide as appropriate  - Encourage maximum independence but intervene and supervise when necessary  - Involve family in performance of ADLs  - Assess for home care needs following discharge   - Consider OT consult to assist with ADL evaluation and planning for discharge  - Provide patient education as appropriate  Outcome: Progressing  Goal: Maintain or return mobility status to optimal level  Description: INTERVENTIONS:  - Assess patient's baseline mobility status (ambulation, transfers, stairs, etc )    - Identify cognitive and physical deficits and behaviors that affect mobility  - Identify mobility aids required to assist with transfers and/or ambulation (gait belt, sit-to-stand, lift, walker, cane, etc )  - [place] fall precautions as indicated by assessment  - Record patient progress and toleration of activity level on Mobility SBAR; progress patient to next Phase/Stage  - Instruct patient to call for assistance with activity based on assessment  - Consider rehabilitation consult to assist with strengthening/weightbearing, etc   Outcome: Progressing     Problem: DISCHARGE PLANNING  Goal: Discharge to home or other facility with appropriate resources  Description: INTERVENTIONS:  - Identify barriers to discharge w/patient and caregiver  - Arrange for needed discharge resources and transportation as appropriate  - Identify discharge learning needs (meds, wound care, etc )  - Arrange for interpretive services to assist at discharge as needed  - Refer to Case Management Department for coordinating discharge planning if the patient needs post-hospital services based on physician/advanced practitioner order or complex needs related to functional status, cognitive ability, or social support system  Outcome: Progressing     Problem: Potential for Falls  Goal: Patient will remain free of falls  Description: INTERVENTIONS:  - Assess patient frequently for physical needs  -  Identify cognitive and physical deficits and behaviors that affect risk of falls  -  [place] fall precautions as indicated by assessment   - Educate patient/family on patient safety including physical limitations  - Instruct patient to call for assistance with activity based on assessment  - Modify environment to reduce risk of injury  - Consider OT/PT consult to assist with strengthening/mobility  Outcome: Progressing     Problem: Knowledge Deficit  Goal: Patient/family/caregiver demonstrates understanding of disease process, treatment plan, medications, and discharge instructions  Description: Complete learning assessment and assess knowledge base    Interventions:  - Provide teaching at level of understanding  - Provide teaching via preferred learning methods  Outcome: Progressing     Problem: POSTPARTUM  Goal: Experiences normal postpartum course  Description: INTERVENTIONS:  - Monitor maternal vital signs  - Assess uterine involution and lochia  Outcome: Progressing  Goal: Appropriate maternal -  bonding  Description: INTERVENTIONS:  - Identify family support  - Assess for appropriate maternal/infant bonding   -Encourage maternal/infant bonding opportunities  - Referral to  or  as needed  Outcome: Progressing  Goal: Establishment of infant feeding pattern  Description: INTERVENTIONS:  - Assess breast/bottle feeding  - Refer to lactation as needed  Outcome: Progressing  Goal: Incision(s), wounds(s) or drain site(s) healing without S/S of infection  Description: INTERVENTIONS  - Assess and document risk factors for skin impairment   - Assess and document dressing, incision, wound bed, drain sites and surrounding tissue  - Consider nutrition services referral as needed  - Oral mucous membranes remain intact  - Provide patient/ family education  Outcome: Progressing     Problem: ALTERATION IN THE BREAST  Goal: Optimize infant feeding at the breast  Description: INTERVENTIONS:  - Latch, breast and nipple assessment  - Assess prior breast feeding history  - Hand expression of breast milk  - For cracked, bleeding and or sore nipples reassess latch, treat damaged nipple  -Educate mother on feeding cues  -Positioning/latch techniques  Outcome: Progressing     Problem: INADEQUATE LATCH, SUCK OR SWALLOW  Goal: Demonstrate ability to latch and sustain latch, audible swallowing and satiety  Description: INTERVENTIONS:  - Assess oral anatomy, notify Physician/AP for abnormal findings  - Establish milk expression  - Maximize feeding opportunity (skin to skin, behavioral state)  - Position/latch techniques  - Discourage use of pacifier-artificial nipple  - Mechanical pumping  - Nipple Shield  - Supplemental formula feeding (Physician/AP order)  - Alternative feeding method  Outcome: Progressing

## 2021-05-19 NOTE — PROGRESS NOTES
Progress Note - OB/GYN   Samantha Pearson 23 y o  female MRN: [id number]  Unit/Bed#: L&D 310-01 Encounter: [id number]    Assessment:  Post partum Day #1 s/p1LTCS, stable, baby in room with mom and dad    Plan:  1  Post partum   - Continue routine post partum care  - Encourage ambulation  - Encourage breastfeeding   - QBL: 474cc, Hgb 13 3 --> 12 1  - UOP: 158cc/hr; s/p Bro removal; Anticipate voiding trial    2  A1GDM: Patient will need 2hr GTT postpartum    3  DVT PPx: Lovenox 40mg QD starting today        Subjective/Objective   Chief Complaint:     Post delivery  Patient is doing well  Lochia WNL  Pain well controlled  Subjective: This morning, she has no complaints  Pain: yes, minimal overnight, improved with meds  Tolerating PO: yes  Voiding: s/p Bro removal  Flatus: yes  BM: no  Ambulating: yes  Breastfeeding:  yes  Chest pain: no  Shortness of breath: no  Leg pain: no  Lochia: minimal    Objective:     Vitals: /73 (BP Location: Right arm)   Pulse 73   Temp 97 8 °F (36 6 °C) (Temporal)   Resp 18   Ht 5' 3" (1 6 m)   Wt 111 kg (243 lb 15 7 oz)   LMP 08/08/2020   SpO2 94%   Breastfeeding Yes   BMI 43 22 kg/m²       Intake/Output Summary (Last 24 hours) at 5/19/2021 0623  Last data filed at 5/19/2021 0600  Gross per 24 hour   Intake 1000 ml   Output 3574 ml   Net -2574 ml       Lab Results   Component Value Date    WBC 16 55 (H) 05/19/2021    HGB 12 1 05/19/2021    HCT 37 3 05/19/2021    MCV 90 05/19/2021     05/19/2021       Physical Exam:   Physical Exam  Vitals signs reviewed  Constitutional:       Appearance: She is well-developed  Cardiovascular:      Rate and Rhythm: Normal rate and regular rhythm  Heart sounds: Normal heart sounds  No murmur  No friction rub  No gallop  Pulmonary:      Effort: Pulmonary effort is normal  No respiratory distress  Breath sounds: Normal breath sounds  No stridor  No wheezing     Abdominal:      General: There is no distension  Palpations: Abdomen is soft  Tenderness: There is no abdominal tenderness  There is no guarding  Comments: Incision clean, dry, and intact without evidence of infection, erythema, or bleeding   Genitourinary:     Comments: Uterus nontender, firm at the umbilicus  Skin:     General: Skin is warm and dry     Psychiatric:         Behavior: Behavior normal            Darío Lucas MD  5/19/2021  6:23 AM

## 2021-05-19 NOTE — LACTATION NOTE
This note was copied from a baby's chart  CONSULT - LACTATION  Baby Girl Manan Aviles) Madi Im 1 days female MRN: [id number]    [facility] NURSERY Room / Bed: L&D 310(N)/L&D 310(N) Encounter: [id number]    Maternal Information     MOTHER:  Ray Corcoran  Maternal Age: 23 y o    OB History: # 1 - Date: 21, Sex: Female, Weight: 4060 g (8 lb 15 2 oz), GA: 39w1d, Delivery: , Low Transverse, Apgar1: 8, Apgar5: 9, Living: Living, Birth Comments: None   Previouse breast reduction surgery? No    Lactation history:   Has patient previously breast fed: No   How long had patient previously breast fed:     Previous breast feeding complications:       Past Surgical History:   Procedure Laterality Date    [place]  DELIVERY ONLY N/A 2021    Procedure:  SECTION (); Surgeon: Em Fischer MD;  Location: [facility];  Service: Obstetrics        Birth information:  YOB: 2019   Time of birth: 8:22 AM   Sex: female   Delivery type: , Low Transverse   Birth Weight: 4060 g (8 lb 15 2 oz)   Percent of Weight Change: -2%     Gestational Age: 36w3d   [unfilled]    Assessment     Breast and nipple assessment: denies need for assistance at this time    [place] Assessment: getting bath at this time    Feeding assessment: feeding well as per mom    LATCH:  Latch: Repeated attempts, hold nipple in mouth, stimulate to suck   Audible Swallowing: A few with stimulation   Type of Nipple: Everted (After stimulation)   Comfort (Breast/Nipple): Soft/non-tender   Hold (Positioning): No assist from staff, mother able to position/hold infant   LATCH Score: 8          Feeding recommendations:  breast feed on demand       Met with mother  Provided mother with Ready, Set, Baby booklet  Discussed Skin to Skin contact an benefits to mom and baby  Talked about the delay of the first bath until baby has adjusted  Spoke about the benefits of rooming in   Feeding on cue and what that means for recognizing infant's hunger  Avoidance of pacifiers for the first month discussed  Talked about exclusive breastfeeding for the first 6 months  Positioning and latch reviewed as well as showing images of other feeding positions  Discussed the properties of a good latch in any position  Reviewed hand/manual expression  Discussed s/s that baby is getting enough milk and some s/s that breastfeeding dyad may need further help  Gave information on common concerns, what to expect the first few weeks after delivery, preparing for other caregivers, and how partners can help  Resources for support also provided  Dad at bedside  [employer] [facility]  to call for assistance, questions and concerns  Extension number for inpatient lactation support provided                  Reji Guzman RN 5/19/2021 9:47 AM

## 2021-05-20 VITALS
RESPIRATION RATE: 18 BRPM | HEART RATE: 72 BPM | WEIGHT: 243.98 LBS | SYSTOLIC BLOOD PRESSURE: 118 MMHG | DIASTOLIC BLOOD PRESSURE: 77 MMHG | HEIGHT: 63 IN | TEMPERATURE: 97.6 F | BODY MASS INDEX: 43.23 KG/M2 | OXYGEN SATURATION: 97 %

## 2021-05-20 PROCEDURE — 99024 POSTOP FOLLOW-UP VISIT: CPT | Performed by: OBSTETRICS & GYNECOLOGY

## 2021-05-20 RX ORDER — ACETAMINOPHEN 325 MG/1
650 TABLET ORAL EVERY 6 HOURS PRN
Refills: 0
Start: 2021-05-20 | End: 2022-06-20

## 2021-05-20 RX ORDER — OXYCODONE HYDROCHLORIDE 5 MG/1
5 TABLET ORAL EVERY 6 HOURS PRN
Qty: 10 TABLET | Refills: 0 | Status: SHIPPED | OUTPATIENT
Start: 2021-05-20 | End: 2021-05-30

## 2021-05-20 RX ORDER — IBUPROFEN 600 MG/1
600 TABLET ORAL EVERY 6 HOURS PRN
Qty: 50 TABLET | Refills: 1 | Status: SHIPPED | OUTPATIENT
Start: 2021-05-20 | End: 2022-06-20

## 2021-05-20 RX ADMIN — DOCUSATE SODIUM 100 MG: 100 CAPSULE ORAL at 08:41

## 2021-05-20 RX ADMIN — ENOXAPARIN SODIUM 40 MG: 40 INJECTION SUBCUTANEOUS at 08:41

## 2021-05-20 RX ADMIN — ACETAMINOPHEN 650 MG: 325 TABLET, FILM COATED ORAL at 02:40

## 2021-05-20 RX ADMIN — IBUPROFEN 600 MG: 600 TABLET ORAL at 08:46

## 2021-05-20 NOTE — LACTATION NOTE
This note was copied from a baby's chart  Met with mother to go over discharge breastfeeding booklet including the feeding log  Emphasized 8 or more (12) feedings in a 24 hour period, what to expect for the number of diapers per day of life and the progression of properties of the  stooling pattern  Reviewed breastfeeding and your lifestyle, storage and preparation of breast milk, how to keep you breast pump clean, the employed breastfeeding mother and paced bottle feeding handouts  Booklet included Breastfeeding Resources for after discharge including access to the number for the [id number]  Mother has been supplementing with formula, but verbalized she plans to start breastfeeding again when she gets home  I enc her to start asap because it will affect her milk supply  She verb she will  Enc to call for assistance if needed,phone # given

## 2021-05-20 NOTE — PROGRESS NOTES
Progress Note - OB/GYN  Refugio iJm 23 y o  female MRN: [id number]  Unit/Bed#: L&D 310-01 Encounter: [id number]      Refugio Jim is a patient of [facility]    Subjective/Objective     Chief Complaint: Postoperative State     Subjective:  Patient is s/p 1LTCS  for breech presentation   She is POD# 2  Her pain is well controlled  She is voiding appropriately  Her incision is C/D/I  She is recovering well and is stable  Possible discharge today       Pain: no  Tolerating Oral Intake: yes  Voiding: yes  Flatus: yes  Bowel Movement: no  Ambulating: yes  Breastfeeding: Breastfeeding and Bottle feeding  Chest Pain: no  Shortness of Breath: no  Leg Pain/Discomfort: no  Lochia: minimal    Vitals:   /52 (BP Location: Right arm)   Pulse 74   Temp 97 6 °F (36 4 °C) (Temporal)   Resp 18   Ht 5' 3" (1 6 m)   Wt 111 kg (243 lb 15 7 oz)   LMP 08/08/2020   SpO2 97%   Breastfeeding Yes   BMI 43 22 kg/m²       Intake/Output Summary (Last 24 hours) at 5/20/2021 0600  Last data filed at 5/19/2021 1600  Gross per 24 hour   Intake --   Output 850 ml   Net -850 ml       Invasive Devices     Peripheral Intravenous Line            Peripheral IV 05/18/21 Left Forearm 1 day                Physical Exam:   GEN: Refugio Jim appears well, alert and oriented x 3, pleasant and cooperative   CARDIO: RRR, no murmurs or rubs  RESP:  CTAB, no wheezes or rales  ABDOMEN: soft, no tenderness, no distention, fundus U-2, Incision C/D/I  EXTREMITIES: SCDs on, Negative Bright's sign bilaterally      Labs:   Admission on 05/18/2021   Component Date Value    WBC 05/18/2021 10 81*    RBC 05/18/2021 4 50     Hemoglobin 05/18/2021 13 3     Hematocrit 05/18/2021 41 7     MCV 05/18/2021 93     MCH 05/18/2021 29 6     MCHC 05/18/2021 31 9     RDW 05/18/2021 14 8     Platelets 67/65/8897 220     MPV 05/18/2021 10 7     RPR 05/18/2021 Non-Reactive     ABO Grouping 05/18/2021 A     Rh Factor 05/18/2021 Positive     Antibody Screen 2021 Negative     Specimen Expiration Date 2021 [id number]     pH, Cord Robert 2021 7  246     pCO2, Cord Robert 2021 55 4*    pO2, Cord Robert 2021 18 9     HCO3, Cord Robert 2021 23 5    [facility] Exc, Cord Robert 2021 -4 7*    O2 Cont, Cord Robert 2021 9 1     O2 HGB,VENOUS CORD 2021 38 8     pH, Cord Art 2021 7 154*    pCO2, Cord Art 2021 74 8*    pO2, Cord Art 2021 19 3     HCO3, Cord Art 2021 25 7     Base Exc, Cord Art 2021 -5 2*    O2 Content, Cord Art 2021 7 2     O2 Hgb, Arterial Cord 2021 31 5     WBC 2021 16 55*    RBC 2021 4 13     Hemoglobin 2021 12 1     Hematocrit 2021 37 3     MCV 2021 90     MCH 2021 29 3     MCHC 2021 32 4     RDW 2021 14 8     Platelets  196     MPV 2021 10 5          Patient Active Problem List   Diagnosis    Vitamin D deficiency    Obesity in pregnancy    Encounter for supervision of normal first pregnancy in third trimester    Diet controlled gestational diabetes mellitus (GDM) in third trimester    Maternal obesity, antepartum, third trimester    39 weeks gestation of pregnancy    Arcuate uterus    Breech presentation    S/P primary low transverse         Assessment and Plan     Cyndie Lauren is POD# 2 s/p 1LTCS   She is recovering well and is stable     POD# 2   - Preop Hgb 13 3 --> post op Hgb 12 1    - On Lovenox 40 mg daily for DVT prophylaxis    - Continue current medications for pain management    - Encourage ambulation    - Encourage breast feeding     Disposition    - Anticipate discharge home on POD# Ul  Dyllan Whitley MD  2021  6:00 AM

## 2021-05-26 LAB — PLACENTA IN STORAGE: NORMAL

## 2021-05-28 ENCOUNTER — OFFICE VISIT (OUTPATIENT)
Dept: OBGYN CLINIC | Facility: CLINIC | Age: 26
End: 2021-05-28

## 2021-05-28 VITALS — SYSTOLIC BLOOD PRESSURE: 120 MMHG | DIASTOLIC BLOOD PRESSURE: 80 MMHG | WEIGHT: 217 LBS | BODY MASS INDEX: 38.44 KG/M2

## 2021-05-28 DIAGNOSIS — Z98.891 S/P PRIMARY LOW TRANSVERSE C-SECTION: Primary | ICD-10-CM

## 2021-05-28 PROCEDURE — 99024 POSTOP FOLLOW-UP VISIT: CPT | Performed by: OBSTETRICS & GYNECOLOGY

## 2021-05-28 NOTE — PROGRESS NOTES
Yoan Miller is a 23 y o   female who presents to the office 1 weeks status post primary     Eating a regular diet without difficulty  Bowel movements are normal  The patient is not having any pain  The following portions of the patient's history were reviewed and updated as appropriate: allergies, current medications, past family history, past medical history, past social history, past surgical history and problem list     Review of Systems  Pertinent items are noted in HPI  Objective  /80   Wt 98 4 kg (217 lb)   BMI 38 44 kg/m²     General:  alert and oriented, in no acute distress   Abdomen: soft, bowel sounds active, non-tender   Incision:   healing well, no drainage, no erythema, no hernia, no seroma, no swelling, no dehiscence, incision well approximated         Assessment      Doing well postoperatively  Operative findings again reviewed  Pathology report discussed  emotionally has support and at times feels overwhelmed and cries but overall well   Plan     1  Continue any current medications  2  Wound care discussed  3  Activity restrictions: no sports  5   Follow up: 5 weeks

## 2021-07-02 ENCOUNTER — POSTPARTUM VISIT (OUTPATIENT)
Dept: OBGYN CLINIC | Facility: CLINIC | Age: 26
End: 2021-07-02

## 2021-07-02 VITALS — SYSTOLIC BLOOD PRESSURE: 120 MMHG | DIASTOLIC BLOOD PRESSURE: 70 MMHG | BODY MASS INDEX: 39.86 KG/M2 | WEIGHT: 225 LBS

## 2021-07-02 PROBLEM — Z3A.39 39 WEEKS GESTATION OF PREGNANCY: Status: RESOLVED | Noted: 2021-04-01 | Resolved: 2021-07-02

## 2021-07-02 PROBLEM — Z34.03 ENCOUNTER FOR SUPERVISION OF NORMAL FIRST PREGNANCY IN THIRD TRIMESTER: Status: RESOLVED | Noted: 2021-01-06 | Resolved: 2021-07-02

## 2021-07-02 PROBLEM — O24.410 DIET CONTROLLED GESTATIONAL DIABETES MELLITUS (GDM) IN THIRD TRIMESTER: Status: RESOLVED | Noted: 2021-03-03 | Resolved: 2021-07-02

## 2021-07-02 PROCEDURE — 99024 POSTOP FOLLOW-UP VISIT: CPT | Performed by: OBSTETRICS & GYNECOLOGY

## 2021-07-02 NOTE — PROGRESS NOTES
Postpartum Visit   [facility]  [address], [address], [place], [place]    Assessment/Plan:  Theresa Garner is a 23y o  year old  who presents for postpartum visit  Routine Postpartum Care  - Normal postpartum exam  - Contraception: none discussed OCPs and IUD  Was on Depo Provera in the past, does not want due to weight gain  - Depression Screen: 4  - Feeding: formula  - Psychosocial support: good support  - Patient Education: Postpartum resources including Pelvic Health PT and Baby & Me  - Cervical cancer screening Up to Date, next due   - GDM, check 2 hour glucola      Additional Problems:  1  Gestational diabetes mellitus (GDM), delivered  -     Glucose JANEL 2HR 75GM Nonpreg; Future      Subjective:     CC: Postpartum visit    Gerold Bosworth is a 23 y o  female  who presents for a postpartum visit  She is approximately 6 weeks postpartum following a 1LTCS on 2021 at 39 weeks for breech presentation  Postpartum course has been normal  Bleeding thin lochia  Bowel function is normal  Bladder function is normal  Patient is not sexually active  Contraception method is none  Postpartum depression screening: negative  Baby's course has been uncomplicated  Baby is feeding by formula  The following portions of the patient's history were reviewed and updated as appropriate: allergies, current medications, past family history, past medical history, obstetric history, gynecologic history, past social history, past surgical history and problem list       Objective:  /70   Wt 102 kg (225 lb)   LMP 2021   BMI 39 86 kg/m²   Pregravid Weight/BMI: No episode found (BMI Could not be calculated)  Current Weight: 102 kg (225 lb)   Total Weight Gain: Not found     Pre-Yesenia Vitals      Most Recent Value   Prenatal Assessment   Prenatal Vitals   Blood Pressure  120/70   Weight - Scale  102 kg (225 lb)   Urine Albumin/Glucose   Dilation/Effacement/Station Vaginal Drainage   Edema           General: Well appearing, no distress  Mood and affect: Appropriate  Respiratory: Unlabored breathing  Clear to auscultate  Cardiovascular: Regular rate and rhythm  Abdomen: Soft, nontender  Incision: intact, good healing  Vulva: no lesions  Vagina: no discharge  Urethra: normal meatus  Cervix: normal  Uterus: mobile, nontender  Adnexa: nontender  Extremities: Warm and well perfused  Non tender

## 2021-07-16 ENCOUNTER — OFFICE VISIT (OUTPATIENT)
Dept: FAMILY MEDICINE CLINIC | Facility: CLINIC | Age: 26
End: 2021-07-16
Payer: COMMERCIAL

## 2021-07-16 VITALS
TEMPERATURE: 97.8 F | DIASTOLIC BLOOD PRESSURE: 74 MMHG | OXYGEN SATURATION: 97 % | WEIGHT: 225 LBS | HEART RATE: 75 BPM | BODY MASS INDEX: 39.87 KG/M2 | SYSTOLIC BLOOD PRESSURE: 118 MMHG | HEIGHT: 63 IN

## 2021-07-16 PROBLEM — O99.213 MATERNAL OBESITY, ANTEPARTUM, THIRD TRIMESTER: Status: RESOLVED | Noted: 2021-03-03 | Resolved: 2021-07-16

## 2021-07-16 PROBLEM — Z98.891 S/P PRIMARY LOW TRANSVERSE C-SECTION: Status: RESOLVED | Noted: 2021-05-18 | Resolved: 2021-07-16

## 2021-07-16 PROBLEM — O99.210 OBESITY IN PREGNANCY: Status: RESOLVED | Noted: 2020-11-12 | Resolved: 2021-07-16

## 2021-07-16 PROCEDURE — 3008F BODY MASS INDEX DOCD: CPT | Performed by: OBSTETRICS & GYNECOLOGY

## 2021-07-16 PROCEDURE — 99213 OFFICE O/P EST LOW 20 MIN: CPT | Performed by: PHYSICIAN ASSISTANT

## 2021-07-16 NOTE — PROGRESS NOTES
Assessment/Plan:    Problem List Items Addressed This Visit     None      Visit Diagnoses     BMI 39 0-39 9,adult    -  Primary           Diagnoses and all orders for this visit:    BMI 39 0-39 9,adult    Other orders  -     Prenatal Vit-Fe Fumarate-FA (PRENATAL VITAMIN PO); Take by mouth        Continue supportive care, try donut pillow  Subjective:      Patient ID: Sania Wise is a 23 y o  female  Isabel Peoples is here today to get established  Was at [facility] on  and hurt her tailbone while riding the [employer]  Has been using heat/ice with some relief  Also, 2 months post-partum  Pt is not breastfeeding  The following portions of the patient's history were reviewed and updated as appropriate:   She has a past medical history of Anxiety, Asthma, Depression, Diet controlled gestational diabetes mellitus (GDM) in third trimester (3/3/2021), and Urinary tract infection  ,  does not have any pertinent problems on file  ,   has a past surgical history that includes pr  delivery only (N/A, 2021)  ,  family history includes Asthma in her mother; Diabetes in her maternal grandmother; Hypertension in her maternal grandmother; Thyroid disease in her maternal grandmother  ,   reports that she has never smoked  She has never used smokeless tobacco  She reports that she does not drink alcohol and does not use drugs  ,  has No Known Allergies     Current Outpatient Medications   Medication Sig Dispense Refill    acetaminophen (TYLENOL) 325 mg tablet Take 2 tablets (650 mg total) by mouth every 6 (six) hours as needed for headaches or fever  0    Prenatal Vit-Fe Fumarate-FA (PRENATAL VITAMIN PO) Take by mouth      Blood Glucose Monitoring Suppl (OneTouch Verio Flex System) w/Device KIT Test 4 times daily  Gestational Diabetes   (Patient not taking: Reported on 2021) 1 kit 0    Blood Glucose Monitoring Suppl (OneTouch Verio) w/Device KIT Use daily to test blood sugar 4 times per day, fasting and 2 hours after the start of each meal  (Patient not taking: Reported on 5/28/2021) 1 kit 0    cholecalciferol (VITAMIN D3) 1,000 units tablet Take 2 tablets (2,000 Units total) by mouth daily (Patient not taking: Reported on 5/12/2021) 180 tablet 3    ibuprofen (MOTRIN) 600 mg tablet Take 1 tablet (600 mg total) by mouth every 6 (six) hours as needed (cramping) (Patient not taking: Reported on 7/16/2021) 50 tablet 1    magnesium oxide (MAG-OX) 400 mg Take 1 tablet (400 mg total) by mouth daily (Patient not taking: Reported on 5/4/2021) 30 tablet 9    OneTouch Delica Lancets 80Y MISC Use 4 a day or as directed  (Patient not taking: Reported on 7/16/2021) 100 each 3    Riboflavin 400 MG CAPS Take 1 capsule (400 mg total) by mouth daily (Patient not taking: Reported on 5/4/2021) 30 capsule 9     No current facility-administered medications for this visit  Review of Systems   Constitutional: Negative for activity change, appetite change, chills, diaphoresis, fatigue, fever and unexpected weight change  HENT: Negative for congestion, ear pain, postnasal drip, rhinorrhea, sinus pressure, sinus pain, sneezing, sore throat, tinnitus and voice change  Eyes: Negative for pain, redness and visual disturbance  Respiratory: Negative for cough, chest tightness, shortness of breath and wheezing  Cardiovascular: Negative for chest pain, palpitations and leg swelling  Gastrointestinal: Negative for abdominal pain, blood in stool, constipation, diarrhea, nausea and vomiting  Genitourinary: Negative for difficulty urinating, dysuria, frequency, hematuria and urgency  Musculoskeletal: Negative for arthralgias, back pain, gait problem, joint swelling, myalgias, neck pain and neck stiffness  Skin: Negative for color change, pallor, rash and wound  Neurological: Negative for dizziness, tremors, weakness, light-headedness and headaches     Psychiatric/Behavioral: Negative for dysphoric mood, self-injury, sleep disturbance and suicidal ideas  The patient is not nervous/anxious  Objective:  Vitals:    07/16/21 1221   BP: 118/74   Pulse: 75   Temp: 97 8 °F (36 6 °C)   SpO2: 97%   Weight: 102 kg (225 lb)   Height: 5' 3" (1 6 m)     Body mass index is 39 86 kg/m²  Physical Exam  Vitals reviewed  Constitutional:       General: She is not in acute distress  Appearance: She is well-developed  She is not diaphoretic  HENT:      Head: Normocephalic and atraumatic  Right Ear: Hearing, tympanic membrane, ear canal and external ear normal       Left Ear: Hearing, tympanic membrane, ear canal and external ear normal       Mouth/Throat:      Pharynx: Uvula midline  No oropharyngeal exudate  Eyes:      General: No scleral icterus  Right eye: No discharge  Left eye: No discharge  Conjunctiva/sclera: Conjunctivae normal    Neck:      Thyroid: No thyromegaly  Vascular: No carotid bruit  Cardiovascular:      Rate and Rhythm: Normal rate and regular rhythm  Heart sounds: Normal heart sounds  No murmur heard  Pulmonary:      Effort: Pulmonary effort is normal  No respiratory distress  Breath sounds: Normal breath sounds  No wheezing  Abdominal:      General: Bowel sounds are normal  There is no distension  Palpations: Abdomen is soft  There is no mass  Tenderness: There is no abdominal tenderness  There is no guarding or rebound  Musculoskeletal:         General: Tenderness (has coccygeal tenderness) present  Normal range of motion  Cervical back: Neck supple  Lymphadenopathy:      Cervical: No cervical adenopathy  Skin:     General: Skin is warm and dry  Findings: No erythema or rash  Neurological:      Mental Status: She is alert and oriented to person, place, and time  Psychiatric:         Behavior: Behavior normal          Thought Content:  Thought content normal          Judgment: Judgment normal          BMI Counseling: Body mass index is 39 86 kg/m²  The BMI is above normal  Nutrition recommendations include 3-5 servings of fruits/vegetables daily

## 2021-07-19 ENCOUNTER — LAB (OUTPATIENT)
Dept: LAB | Facility: HOSPITAL | Age: 26
End: 2021-07-19
Attending: OBSTETRICS & GYNECOLOGY
Payer: COMMERCIAL

## 2021-07-19 LAB
GLUCOSE 2H P 75 G GLC PO SERPL-MCNC: 90 MG/DL (ref 65–139)
GLUCOSE P FAST SERPL-MCNC: 89 MG/DL (ref 65–99)

## 2021-07-19 PROCEDURE — 82947 ASSAY GLUCOSE BLOOD QUANT: CPT

## 2021-07-19 PROCEDURE — 82950 GLUCOSE TEST: CPT

## 2021-07-19 PROCEDURE — 36415 COLL VENOUS BLD VENIPUNCTURE: CPT

## 2021-07-28 ENCOUNTER — OFFICE VISIT (OUTPATIENT)
Dept: OBGYN CLINIC | Facility: MEDICAL CENTER | Age: 26
End: 2021-07-28
Payer: COMMERCIAL

## 2021-07-28 VITALS — SYSTOLIC BLOOD PRESSURE: 120 MMHG | WEIGHT: 223 LBS | DIASTOLIC BLOOD PRESSURE: 70 MMHG | BODY MASS INDEX: 39.5 KG/M2

## 2021-07-28 DIAGNOSIS — R42 DIZZINESS: Primary | ICD-10-CM

## 2021-07-28 DIAGNOSIS — E55.9 VITAMIN D DEFICIENCY: ICD-10-CM

## 2021-07-28 PROCEDURE — 99213 OFFICE O/P EST LOW 20 MIN: CPT | Performed by: OBSTETRICS & GYNECOLOGY

## 2021-07-28 PROCEDURE — 1036F TOBACCO NON-USER: CPT | Performed by: OBSTETRICS & GYNECOLOGY

## 2021-07-28 NOTE — PROGRESS NOTES
[facility] [address] [place] [address]    Assessment/Plan:  No problem-specific Assessment & Plan notes found for this encounter  There are no diagnoses linked to this encounter  Subjective:   Niru Murguia is a 23 y o   female  CC: dizziness    HPI: HPI  Patient presents with complaints of dizziness  She states her symptoms started earlier this month  Not associated with lying or standing  No association with eating  She states she gets sufficient sleep, 6-7 hours a night  Denies headaches, palpitations, SOB, syncope  Had normal 2 hour GTT postparum     ROS: Review of Systems   Constitutional: Negative  HENT: Negative  Eyes: Negative  Respiratory: Negative  Cardiovascular: Negative  Gastrointestinal: Negative  Genitourinary: Negative  Musculoskeletal: Negative  All other systems reviewed and are negative  PFSH: The following portions of the patient's history were reviewed and updated as appropriate: allergies, current medications, past family history, past medical history, obstetric history, gynecologic history, past social history, past surgical history and problem list        Objective:  /70   Wt 101 kg (223 lb)   LMP 2021   BMI 39 50 kg/m²    Physical Exam  Vitals reviewed  Constitutional:       Appearance: Normal appearance  Cardiovascular:      Rate and Rhythm: Normal rate  Pulmonary:      Effort: Pulmonary effort is normal  No respiratory distress  Neurological:      Mental Status: She is alert     Psychiatric:         Mood and Affect: Mood normal          Behavior: Behavior normal

## 2021-08-23 ENCOUNTER — TELEPHONE (OUTPATIENT)
Dept: OBGYN CLINIC | Facility: MEDICAL CENTER | Age: 26
End: 2021-08-23

## 2021-08-23 NOTE — TELEPHONE ENCOUNTER
Pt lmovm stating she faxed in a fmla form to be filled out by Dr Graciela Negron but her employer has not received it  I looked in the folder and her chart and do not see it  Can you please follow up? Thanks!

## 2021-10-27 ENCOUNTER — RA CDI HCC (OUTPATIENT)
Dept: OTHER | Facility: HOSPITAL | Age: 26
End: 2021-10-27

## 2021-11-03 ENCOUNTER — OFFICE VISIT (OUTPATIENT)
Dept: FAMILY MEDICINE CLINIC | Facility: CLINIC | Age: 26
End: 2021-11-03
Payer: COMMERCIAL

## 2021-11-03 VITALS
HEIGHT: 63 IN | OXYGEN SATURATION: 97 % | TEMPERATURE: 98.1 F | DIASTOLIC BLOOD PRESSURE: 84 MMHG | SYSTOLIC BLOOD PRESSURE: 130 MMHG | HEART RATE: 96 BPM | WEIGHT: 237 LBS | BODY MASS INDEX: 41.99 KG/M2

## 2021-11-03 DIAGNOSIS — F41.9 ANXIETY AND DEPRESSION: ICD-10-CM

## 2021-11-03 DIAGNOSIS — F32.A ANXIETY AND DEPRESSION: ICD-10-CM

## 2021-11-03 DIAGNOSIS — M53.3 COCCYGEAL PAIN: Primary | ICD-10-CM

## 2021-11-03 PROCEDURE — 1036F TOBACCO NON-USER: CPT | Performed by: PHYSICIAN ASSISTANT

## 2021-11-03 PROCEDURE — 99214 OFFICE O/P EST MOD 30 MIN: CPT | Performed by: PHYSICIAN ASSISTANT

## 2021-11-03 PROCEDURE — 3725F SCREEN DEPRESSION PERFORMED: CPT | Performed by: PHYSICIAN ASSISTANT

## 2021-11-03 PROCEDURE — 3008F BODY MASS INDEX DOCD: CPT | Performed by: PHYSICIAN ASSISTANT

## 2021-11-05 ENCOUNTER — TELEPHONE (OUTPATIENT)
Dept: FAMILY MEDICINE CLINIC | Facility: CLINIC | Age: 26
End: 2021-11-05

## 2021-11-09 ENCOUNTER — HOSPITAL ENCOUNTER (OUTPATIENT)
Dept: RADIOLOGY | Facility: HOSPITAL | Age: 26
Discharge: HOME | End: 2021-11-09
Payer: COMMERCIAL

## 2021-11-09 DIAGNOSIS — M53.3 COCCYGEAL PAIN: ICD-10-CM

## 2021-11-09 PROCEDURE — 72220 X-RAY EXAM SACRUM TAILBONE: CPT

## 2021-11-15 DIAGNOSIS — S33.2XXD CLOSED DISLOCATION OF COCCYX, SUBSEQUENT ENCOUNTER: Primary | ICD-10-CM

## 2021-11-19 ENCOUNTER — TELEPHONE (OUTPATIENT)
Dept: FAMILY MEDICINE CLINIC | Facility: CLINIC | Age: 26
End: 2021-11-19

## 2021-11-19 DIAGNOSIS — S33.2XXD CLOSED DISLOCATION OF COCCYX, SUBSEQUENT ENCOUNTER: Primary | ICD-10-CM

## 2021-11-19 DIAGNOSIS — M53.3 COCCYGEAL PAIN: ICD-10-CM

## 2021-12-02 ENCOUNTER — APPOINTMENT (OUTPATIENT)
Dept: RADIOLOGY | Facility: MEDICAL CENTER | Age: 26
End: 2021-12-02
Payer: COMMERCIAL

## 2021-12-02 ENCOUNTER — OFFICE VISIT (OUTPATIENT)
Dept: FAMILY MEDICINE CLINIC | Facility: CLINIC | Age: 26
End: 2021-12-02
Payer: COMMERCIAL

## 2021-12-02 ENCOUNTER — OFFICE VISIT (OUTPATIENT)
Dept: OBGYN CLINIC | Facility: CLINIC | Age: 26
End: 2021-12-02
Payer: COMMERCIAL

## 2021-12-02 VITALS
DIASTOLIC BLOOD PRESSURE: 83 MMHG | HEART RATE: 108 BPM | SYSTOLIC BLOOD PRESSURE: 131 MMHG | BODY MASS INDEX: 42.35 KG/M2 | HEIGHT: 63 IN | WEIGHT: 239 LBS

## 2021-12-02 VITALS
BODY MASS INDEX: 42.35 KG/M2 | TEMPERATURE: 97.4 F | WEIGHT: 239 LBS | HEIGHT: 63 IN | DIASTOLIC BLOOD PRESSURE: 94 MMHG | OXYGEN SATURATION: 97 % | HEART RATE: 91 BPM | SYSTOLIC BLOOD PRESSURE: 128 MMHG

## 2021-12-02 DIAGNOSIS — S33.2XXD CLOSED DISLOCATION OF COCCYX, SUBSEQUENT ENCOUNTER: ICD-10-CM

## 2021-12-02 DIAGNOSIS — F32.A ANXIETY AND DEPRESSION: ICD-10-CM

## 2021-12-02 DIAGNOSIS — M53.3 COCCYGEAL PAIN: ICD-10-CM

## 2021-12-02 DIAGNOSIS — F41.9 ANXIETY AND DEPRESSION: ICD-10-CM

## 2021-12-02 PROCEDURE — 3725F SCREEN DEPRESSION PERFORMED: CPT | Performed by: PHYSICIAN ASSISTANT

## 2021-12-02 PROCEDURE — 99213 OFFICE O/P EST LOW 20 MIN: CPT | Performed by: PHYSICIAN ASSISTANT

## 2021-12-02 PROCEDURE — 99203 OFFICE O/P NEW LOW 30 MIN: CPT | Performed by: ORTHOPAEDIC SURGERY

## 2021-12-02 PROCEDURE — 72220 X-RAY EXAM SACRUM TAILBONE: CPT

## 2021-12-02 PROCEDURE — 1036F TOBACCO NON-USER: CPT | Performed by: ORTHOPAEDIC SURGERY

## 2021-12-02 PROCEDURE — 3008F BODY MASS INDEX DOCD: CPT | Performed by: ORTHOPAEDIC SURGERY

## 2021-12-02 RX ORDER — SERTRALINE HYDROCHLORIDE 100 MG/1
100 TABLET, FILM COATED ORAL DAILY
Qty: 30 TABLET | Refills: 0 | Status: SHIPPED | OUTPATIENT
Start: 2021-12-02 | End: 2022-01-13 | Stop reason: SDUPTHER

## 2021-12-21 NOTE — TELEPHONE ENCOUNTER
LCSW called and spoke to client  Client reported she was not interested in services at this time and would outreach LCSW if she would like services 
unknown

## 2021-12-28 ENCOUNTER — TELEPHONE (OUTPATIENT)
Dept: FAMILY MEDICINE CLINIC | Facility: CLINIC | Age: 26
End: 2021-12-28

## 2021-12-28 DIAGNOSIS — B34.9 VIRAL INFECTION, UNSPECIFIED: Primary | ICD-10-CM

## 2021-12-29 PROCEDURE — U0005 INFEC AGEN DETEC AMPLI PROBE: HCPCS | Performed by: FAMILY MEDICINE

## 2021-12-29 PROCEDURE — U0003 INFECTIOUS AGENT DETECTION BY NUCLEIC ACID (DNA OR RNA); SEVERE ACUTE RESPIRATORY SYNDROME CORONAVIRUS 2 (SARS-COV-2) (CORONAVIRUS DISEASE [COVID-19]), AMPLIFIED PROBE TECHNIQUE, MAKING USE OF HIGH THROUGHPUT TECHNOLOGIES AS DESCRIBED BY CMS-2020-01-R: HCPCS | Performed by: FAMILY MEDICINE

## 2022-01-04 ENCOUNTER — TELEPHONE (OUTPATIENT)
Dept: FAMILY MEDICINE CLINIC | Facility: CLINIC | Age: 27
End: 2022-01-04

## 2022-01-04 NOTE — TELEPHONE ENCOUNTER
Pt had covid test on 12/29 and was negative  She is still not feeling well and took a home test today that came up positive  Asking for recommendations?

## 2022-01-04 NOTE — TELEPHONE ENCOUNTER
Pt asking if there is anything you suggest she can take for cough  Said she feels like she coughs so hard that she runs out of breath

## 2022-01-06 ENCOUNTER — RA CDI HCC (OUTPATIENT)
Dept: OTHER | Facility: HOSPITAL | Age: 27
End: 2022-01-06

## 2022-01-06 NOTE — PROGRESS NOTES
[facility]  coding opportunities          [facility]  coding opportunities             Chart Reviewed * (Number of) Inbasket suggestions sent to Provider: 1            Number of suggestions used: 0      Number of suggestions NOT actually used: 1     Patients insurance company: [employer] (Medicare Advantage and Commercial)     Visit status: Patient arrived for their scheduled appointment     Provider never responded to [facility]  coding request                       Patients insurance company: [employer] (Medicare Advantage and [employer] [place] [employer])           Based on clinical documentation indicated in your record, it appears that the patient may have the following conditions     BMI>40      E66 01 Morbid (severe) obesity due to excess calories ([facility] )

## 2022-01-13 ENCOUNTER — OFFICE VISIT (OUTPATIENT)
Dept: FAMILY MEDICINE CLINIC | Facility: CLINIC | Age: 27
End: 2022-01-13
Payer: COMMERCIAL

## 2022-01-13 VITALS
HEART RATE: 102 BPM | TEMPERATURE: 97 F | DIASTOLIC BLOOD PRESSURE: 78 MMHG | WEIGHT: 240.2 LBS | BODY MASS INDEX: 42.56 KG/M2 | HEIGHT: 63 IN | OXYGEN SATURATION: 95 % | SYSTOLIC BLOOD PRESSURE: 108 MMHG

## 2022-01-13 DIAGNOSIS — F41.9 ANXIETY AND DEPRESSION: Primary | ICD-10-CM

## 2022-01-13 DIAGNOSIS — S33.2XXD CLOSED DISLOCATION OF COCCYX, SUBSEQUENT ENCOUNTER: ICD-10-CM

## 2022-01-13 DIAGNOSIS — F32.A ANXIETY AND DEPRESSION: Primary | ICD-10-CM

## 2022-01-13 PROCEDURE — 1036F TOBACCO NON-USER: CPT | Performed by: PHYSICIAN ASSISTANT

## 2022-01-13 PROCEDURE — 3008F BODY MASS INDEX DOCD: CPT | Performed by: PHYSICIAN ASSISTANT

## 2022-01-13 PROCEDURE — 99213 OFFICE O/P EST LOW 20 MIN: CPT | Performed by: PHYSICIAN ASSISTANT

## 2022-01-13 PROCEDURE — 3725F SCREEN DEPRESSION PERFORMED: CPT | Performed by: PHYSICIAN ASSISTANT

## 2022-01-13 RX ORDER — SERTRALINE HYDROCHLORIDE 100 MG/1
100 TABLET, FILM COATED ORAL DAILY
Qty: 90 TABLET | Refills: 0 | Status: SHIPPED | OUTPATIENT
Start: 2022-01-13

## 2022-01-13 NOTE — PROGRESS NOTES
Assessment/Plan:    Problem List Items Addressed This Visit        Other    Anxiety and depression - Primary    Relevant Medications    sertraline (ZOLOFT) 100 mg tablet      Other Visit Diagnoses     Closed dislocation of coccyx, subsequent encounter               Diagnoses and all orders for this visit:    Anxiety and depression  -     sertraline (ZOLOFT) 100 mg tablet; Take 1 tablet (100 mg total) by mouth daily    Closed dislocation of coccyx, subsequent encounter        No problem-specific Assessment & Plan notes found for this encounter  Subjective:      Patient ID: Wendi Solo is a 27 y o  female  Suzette Mccainankur is here today for 1 month follow up  She is doing much better with anxiety and depression since increasing sertraline to 100 mg last month  Would like to continue at this dose, will refill a 90 day supply today  Since last OV with me, pt evaluated by ortho regarding dislocated coccyx, told there is no intervention needed  The following portions of the patient's history were reviewed and updated as appropriate:   She has a past medical history of Anxiety, Asthma, Depression, Diet controlled gestational diabetes mellitus (GDM) in third trimester (3/3/2021), and Urinary tract infection  ,  does not have any pertinent problems on file  ,   has a past surgical history that includes pr  delivery only (N/A, 2021)  ,  family history includes Asthma in her mother; Diabetes in her maternal grandmother; Hypertension in her maternal grandmother; Thyroid disease in her maternal grandmother  ,   reports that she has never smoked  She has never used smokeless tobacco  She reports previous alcohol use  She reports that she does not use drugs  ,  has No Known Allergies     Current Outpatient Medications   Medication Sig Dispense Refill    sertraline (ZOLOFT) 100 mg tablet Take 1 tablet (100 mg total) by mouth daily 90 tablet 0    acetaminophen (TYLENOL) 325 mg tablet Take 2 tablets (650 mg total) by mouth every 6 (six) hours as needed for headaches or fever (Patient not taking: Reported on 11/3/2021)  0    Blood Glucose Monitoring Suppl (OneTouch Verio Flex System) w/Device KIT Test 4 times daily  Gestational Diabetes  (Patient not taking: Reported on 5/28/2021) 1 kit 0    Blood Glucose Monitoring Suppl (OneTouch Verio) w/Device KIT Use daily to test blood sugar 4 times per day, fasting and 2 hours after the start of each meal  (Patient not taking: Reported on 5/28/2021) 1 kit 0    cholecalciferol (VITAMIN D3) 1,000 units tablet Take 2 tablets (2,000 Units total) by mouth daily (Patient not taking: Reported on 5/12/2021) 180 tablet 3    ibuprofen (MOTRIN) 600 mg tablet Take 1 tablet (600 mg total) by mouth every 6 (six) hours as needed (cramping) (Patient not taking: Reported on 7/16/2021) 50 tablet 1    magnesium oxide (MAG-OX) 400 mg Take 1 tablet (400 mg total) by mouth daily (Patient not taking: Reported on 5/4/2021) 30 tablet 9    OneTouch Delica Lancets 55I MISC Use 4 a day or as directed  (Patient not taking: Reported on 7/16/2021) 100 each 3    Prenatal Vit-Fe Fumarate-FA (PRENATAL VITAMIN PO) Take by mouth (Patient not taking: Reported on 7/28/2021)      Riboflavin 400 MG CAPS Take 1 capsule (400 mg total) by mouth daily (Patient not taking: Reported on 5/4/2021) 30 capsule 9     No current facility-administered medications for this visit  Review of Systems   Constitutional: Negative for activity change, appetite change, chills, diaphoresis, fatigue, fever and unexpected weight change  HENT: Negative for congestion, ear pain, postnasal drip, rhinorrhea, sinus pressure, sinus pain, sneezing, sore throat, tinnitus and voice change  Eyes: Negative for pain, redness and visual disturbance  Respiratory: Negative for cough, chest tightness, shortness of breath and wheezing  Cardiovascular: Negative for chest pain, palpitations and leg swelling     Gastrointestinal: Negative for abdominal pain, blood in stool, constipation, diarrhea, nausea and vomiting  Genitourinary: Negative for difficulty urinating, dysuria, frequency, hematuria and urgency  Musculoskeletal: Negative for arthralgias, back pain, gait problem, joint swelling, myalgias, neck pain and neck stiffness  Skin: Negative for color change, pallor, rash and wound  Neurological: Negative for dizziness, tremors, weakness, light-headedness and headaches  Psychiatric/Behavioral: Negative for dysphoric mood, self-injury, sleep disturbance and suicidal ideas  The patient is not nervous/anxious  Objective:  Vitals:    01/13/22 0910   BP: 108/78   Pulse: 102   Temp: (!) 97 °F (36 1 °C)   SpO2: 95%   Weight: 109 kg (240 lb 3 2 oz)   Height: 5' 3" (1 6 m)     Body mass index is 42 55 kg/m²  Physical Exam  Vitals reviewed  Constitutional:       General: She is not in acute distress  Appearance: She is well-developed  She is not diaphoretic  HENT:      Head: Normocephalic and atraumatic  Right Ear: Hearing, tympanic membrane, ear canal and external ear normal       Left Ear: Hearing, tympanic membrane, ear canal and external ear normal       Mouth/Throat:      Pharynx: Uvula midline  No oropharyngeal exudate  Eyes:      General: No scleral icterus  Right eye: No discharge  Left eye: No discharge  Conjunctiva/sclera: Conjunctivae normal    Neck:      Thyroid: No thyromegaly  Vascular: No carotid bruit  Cardiovascular:      Rate and Rhythm: Normal rate and regular rhythm  Heart sounds: Normal heart sounds  No murmur heard  Pulmonary:      Effort: Pulmonary effort is normal  No respiratory distress  Breath sounds: Normal breath sounds  No wheezing  Abdominal:      General: Bowel sounds are normal  There is no distension  Palpations: Abdomen is soft  There is no mass  Tenderness: There is no abdominal tenderness  There is no guarding or rebound  Musculoskeletal:         General: No tenderness  Normal range of motion  Cervical back: Neck supple  Lymphadenopathy:      Cervical: No cervical adenopathy  Skin:     General: Skin is warm and dry  Findings: No erythema or rash  Neurological:      Mental Status: She is alert and oriented to person, place, and time  Psychiatric:         Behavior: Behavior normal          Thought Content:  Thought content normal          Judgment: Judgment normal

## 2022-01-25 ENCOUNTER — TELEPHONE (OUTPATIENT)
Dept: FAMILY MEDICINE CLINIC | Facility: CLINIC | Age: 27
End: 2022-01-25

## 2022-01-25 NOTE — TELEPHONE ENCOUNTER
Pt to call insurance, likely has a deductible         ----- Message from Singh Umaña sent at 1/25/2022  9:50 AM EST -----  Regarding: FW: Zoloft Medication    ----- Message -----  From: Manohar Sahni  Sent: 1/25/2022   9:45 AM EST  To: [facility] Clinical  Subject: Zoloft Medication                                Goodmorning, I picked up my medication yesterday and they charged me $70 for a 3 month supply  When I would get a 1 month supply it was free  Should I have been charged? I wasn't sure if I should contact you or my health insurance   Thank you

## 2022-03-21 ENCOUNTER — TELEPHONE (OUTPATIENT)
Dept: FAMILY MEDICINE CLINIC | Facility: CLINIC | Age: 27
End: 2022-03-21

## 2022-03-21 NOTE — TELEPHONE ENCOUNTER
----- Message from Sonja Del Cid sent at 3/21/2022  6:47 AM EDT -----  Regarding: FW: Numbness/tingling     ----- Message -----  From: Phani Vail  Sent: 3/20/2022   9:49 PM EDT  To: [facility]  Subject: Numbness/tingling                                Hello, Ive noticed lately that my hands and feet are getting tingly  More so my feet as well as my big toe going numb here and there  Its definitely not because Im losing circulation  It will happen whether Im laying down, standing, or walking  I did have gestational diabetes when I was pregnant and I do get tested for diabetes every year I believe, but I think I should get tested again  I do also have all the symptoms of Fibromyalgia and I didnt know if this is something common due to being postpartum  It has me concerned  If you can get back to me when you have the chance I would appreciate it   Thanks

## 2022-03-21 NOTE — TELEPHONE ENCOUNTER
Please schedule OV          ----- Message from Mamadou Mayo sent at 3/21/2022  6:47 AM EDT -----  Regarding: FW: Numbness/tingling     ----- Message -----  From: Sonali Lee  Sent: 3/20/2022   9:49 PM EDT  To: REESE PAREDES [facility]  Subject: Numbness/tingling                                Hello, Ive noticed lately that my hands and feet are getting tingly  More so my feet as well as my big toe going numb here and there  Its definitely not because Im losing circulation  It will happen whether Im laying down, standing, or walking  I did have gestational diabetes when I was pregnant and I do get tested for diabetes every year I believe, but I think I should get tested again  I do also have all the symptoms of Fibromyalgia and I didnt know if this is something common due to being postpartum  It has me concerned  If you can get back to me when you have the chance I would appreciate it   Thanks

## 2022-03-23 ENCOUNTER — OFFICE VISIT (OUTPATIENT)
Dept: FAMILY MEDICINE CLINIC | Facility: CLINIC | Age: 27
End: 2022-03-23
Payer: COMMERCIAL

## 2022-03-23 VITALS
BODY MASS INDEX: 43.45 KG/M2 | SYSTOLIC BLOOD PRESSURE: 124 MMHG | TEMPERATURE: 97.7 F | HEIGHT: 63 IN | WEIGHT: 245.2 LBS | HEART RATE: 97 BPM | OXYGEN SATURATION: 96 % | DIASTOLIC BLOOD PRESSURE: 72 MMHG

## 2022-03-23 DIAGNOSIS — R20.2 PARESTHESIA OF BOTH FEET: Primary | ICD-10-CM

## 2022-03-23 DIAGNOSIS — R20.2 PARESTHESIA OF BOTH HANDS: ICD-10-CM

## 2022-03-23 DIAGNOSIS — Z13.89 SCREENING FOR MULTIPLE CONDITIONS: ICD-10-CM

## 2022-03-23 PROCEDURE — 3008F BODY MASS INDEX DOCD: CPT | Performed by: PHYSICIAN ASSISTANT

## 2022-03-23 PROCEDURE — 1036F TOBACCO NON-USER: CPT | Performed by: PHYSICIAN ASSISTANT

## 2022-03-23 PROCEDURE — 99214 OFFICE O/P EST MOD 30 MIN: CPT | Performed by: PHYSICIAN ASSISTANT

## 2022-03-23 NOTE — PROGRESS NOTES
Assessment/Plan:    Problem List Items Addressed This Visit     None      Visit Diagnoses     Paresthesia of both feet    -  Primary    Relevant Orders    CBC    Comprehensive metabolic panel    Lyme Total Antibody Profile with reflex to WB    Vitamin B12    Hemoglobin A1C    TSH, 3rd generation with Free T4 reflex    MRI brain w wo contrast    MRI cervical spine w wo contrast    Paresthesia of both hands        Relevant Orders    CBC    Comprehensive metabolic panel    Lyme Total Antibody Profile with reflex to WB    Vitamin B12    Hemoglobin A1C    TSH, 3rd generation with Free T4 reflex    MRI brain w wo contrast    MRI cervical spine w wo contrast    Screening for multiple conditions        Relevant Orders    Hemoglobin A1C    TSH, 3rd generation with Free T4 reflex           Diagnoses and all orders for this visit:    Paresthesia of both feet  -     CBC; Future  -     Comprehensive metabolic panel; Future  -     Lyme Total Antibody Profile with reflex to WB; Future  -     Vitamin B12; Future  -     Hemoglobin A1C; Future  -     TSH, 3rd generation with Free T4 reflex; Future  -     MRI brain w wo contrast; Future  -     MRI cervical spine w wo contrast; Future    Paresthesia of both hands  -     CBC; Future  -     Comprehensive metabolic panel; Future  -     Lyme Total Antibody Profile with reflex to WB; Future  -     Vitamin B12; Future  -     Hemoglobin A1C; Future  -     TSH, 3rd generation with Free T4 reflex; Future  -     MRI brain w wo contrast; Future  -     MRI cervical spine w wo contrast; Future    Screening for multiple conditions  -     Hemoglobin A1C; Future  -     TSH, 3rd generation with Free T4 reflex; Future        No problem-specific Assessment & Plan notes found for this encounter  Subjective:      Patient ID: José Manuel Siddiqi is a 27 y o  female  Pt here today after developing tingling sensation in bilateral hands and feet 2 weeks ago   Symptoms come and go, but were consistently present over the past 2-3 days  Currently is asymptomatic  Denies weakness or vision changes, no new headaches  At times, toes will feel numb  The following portions of the patient's history were reviewed and updated as appropriate:   She has a past medical history of Anxiety, Asthma, Depression, Diet controlled gestational diabetes mellitus (GDM) in third trimester (3/3/2021), and Urinary tract infection  ,  does not have any pertinent problems on file  ,   has a past surgical history that includes pr  delivery only (N/A, 2021)  ,  family history includes Asthma in her mother; Diabetes in her maternal grandmother; Hypertension in her maternal grandmother; Thyroid disease in her maternal grandmother  ,   reports that she has never smoked  She has never used smokeless tobacco  She reports previous alcohol use  She reports that she does not use drugs  ,  has No Known Allergies     Current Outpatient Medications   Medication Sig Dispense Refill    acetaminophen (TYLENOL) 325 mg tablet Take 2 tablets (650 mg total) by mouth every 6 (six) hours as needed for headaches or fever (Patient not taking: Reported on 11/3/2021)  0    Blood Glucose Monitoring Suppl (OneTouch Verio Flex System) w/Device KIT Test 4 times daily  Gestational Diabetes   (Patient not taking: Reported on 2021) 1 kit 0    Blood Glucose Monitoring Suppl (OneTouch Verio) w/Device KIT Use daily to test blood sugar 4 times per day, fasting and 2 hours after the start of each meal  (Patient not taking: Reported on 2021) 1 kit 0    cholecalciferol (VITAMIN D3) 1,000 units tablet Take 2 tablets (2,000 Units total) by mouth daily (Patient not taking: Reported on 2021) 180 tablet 3    ibuprofen (MOTRIN) 600 mg tablet Take 1 tablet (600 mg total) by mouth every 6 (six) hours as needed (cramping) (Patient not taking: Reported on 2021) 50 tablet 1    magnesium oxide (MAG-OX) 400 mg Take 1 tablet (400 mg total) by mouth daily (Patient not taking: Reported on 5/4/2021) 30 tablet 9    OneTouch Delica Lancets 92O MISC Use 4 a day or as directed  (Patient not taking: Reported on 7/16/2021) 100 each 3    Prenatal Vit-Fe Fumarate-FA (PRENATAL VITAMIN PO) Take by mouth (Patient not taking: Reported on 7/28/2021)      Riboflavin 400 MG CAPS Take 1 capsule (400 mg total) by mouth daily (Patient not taking: Reported on 5/4/2021) 30 capsule 9    sertraline (ZOLOFT) 100 mg tablet Take 1 tablet (100 mg total) by mouth daily (Patient not taking: Reported on 3/23/2022 ) 90 tablet 0     No current facility-administered medications for this visit  Review of Systems   Constitutional: Negative for activity change, appetite change, chills, diaphoresis, fatigue, fever and unexpected weight change  HENT: Negative for congestion, ear pain, postnasal drip, rhinorrhea, sinus pressure, sinus pain, sneezing, sore throat, tinnitus and voice change  Eyes: Negative for pain, redness and visual disturbance  Respiratory: Negative for cough, chest tightness, shortness of breath and wheezing  Cardiovascular: Negative for chest pain, palpitations and leg swelling  Gastrointestinal: Negative for abdominal pain, blood in stool, constipation, diarrhea, nausea and vomiting  Genitourinary: Negative for difficulty urinating, dysuria, frequency, hematuria and urgency  Musculoskeletal: Negative for arthralgias, back pain, gait problem, joint swelling, myalgias, neck pain and neck stiffness  Skin: Negative for color change, pallor, rash and wound  Neurological: Positive for numbness (numb sensation in toes at times)  Negative for dizziness, tremors, weakness, light-headedness and headaches  Psychiatric/Behavioral: Negative for dysphoric mood, self-injury, sleep disturbance and suicidal ideas  The patient is not nervous/anxious            Objective:  Vitals:    03/23/22 0844   BP: 124/72   Pulse: 97   Temp: 97 7 °F (36 5 °C)   SpO2: 96% Weight: 111 kg (245 lb 3 2 oz)   Height: 5' 3" (1 6 m)     Body mass index is 43 44 kg/m²  Physical Exam  Vitals reviewed  Constitutional:       General: She is not in acute distress  Appearance: She is well-developed  She is not diaphoretic  HENT:      Head: Normocephalic and atraumatic  Right Ear: Hearing, tympanic membrane, ear canal and external ear normal       Left Ear: Hearing, tympanic membrane, ear canal and external ear normal       Mouth/Throat:      Pharynx: Uvula midline  No oropharyngeal exudate  Eyes:      General: No scleral icterus  Right eye: No discharge  Left eye: No discharge  Conjunctiva/sclera: Conjunctivae normal    Neck:      Thyroid: No thyromegaly  Vascular: No carotid bruit  Cardiovascular:      Rate and Rhythm: Normal rate and regular rhythm  Heart sounds: Normal heart sounds  No murmur heard  Pulmonary:      Effort: Pulmonary effort is normal  No respiratory distress  Breath sounds: Normal breath sounds  No wheezing  Abdominal:      General: Bowel sounds are normal  There is no distension  Palpations: Abdomen is soft  There is no mass  Tenderness: There is no abdominal tenderness  There is no guarding or rebound  Musculoskeletal:         General: No tenderness  Normal range of motion  Cervical back: Neck supple  Lymphadenopathy:      Cervical: No cervical adenopathy  Skin:     General: Skin is warm and dry  Findings: No erythema or rash  Neurological:      Mental Status: She is alert and oriented to person, place, and time  Motor: No weakness  Psychiatric:         Behavior: Behavior normal          Thought Content:  Thought content normal          Judgment: Judgment normal

## 2022-03-30 ENCOUNTER — APPOINTMENT (OUTPATIENT)
Dept: LAB | Facility: MEDICAL CENTER | Age: 27
End: 2022-03-30
Payer: COMMERCIAL

## 2022-03-30 DIAGNOSIS — Z13.89 SCREENING FOR MULTIPLE CONDITIONS: ICD-10-CM

## 2022-03-30 DIAGNOSIS — R20.2 PARESTHESIA OF BOTH HANDS: ICD-10-CM

## 2022-03-30 DIAGNOSIS — R20.2 PARESTHESIA OF BOTH FEET: ICD-10-CM

## 2022-03-30 LAB
ALBUMIN SERPL BCP-MCNC: 3.8 G/DL (ref 3.5–5)
ALP SERPL-CCNC: 66 U/L (ref 46–116)
ALT SERPL W P-5'-P-CCNC: 19 U/L (ref 12–78)
ANION GAP SERPL CALCULATED.3IONS-SCNC: 6 MMOL/L (ref 4–13)
AST SERPL W P-5'-P-CCNC: 10 U/L (ref 5–45)
BILIRUB SERPL-MCNC: 0.4 MG/DL (ref 0.2–1)
BUN SERPL-MCNC: 16 MG/DL (ref 5–25)
CALCIUM SERPL-MCNC: 9 MG/DL (ref 8.3–10.1)
CHLORIDE SERPL-SCNC: 111 MMOL/L (ref 100–108)
CO2 SERPL-SCNC: 24 MMOL/L (ref 21–32)
CREAT SERPL-MCNC: 0.73 MG/DL (ref 0.6–1.3)
ERYTHROCYTE [DISTWIDTH] IN BLOOD BY AUTOMATED COUNT: 13.8 % (ref 11.6–15.1)
EST. AVERAGE GLUCOSE BLD GHB EST-MCNC: 114 MG/DL
GFR SERPL CREATININE-BSD FRML MDRD: 115 ML/MIN/1.73SQ M
GLUCOSE P FAST SERPL-MCNC: 102 MG/DL (ref 65–99)
HBA1C MFR BLD: 5.6 %
HCT VFR BLD AUTO: 41.7 % (ref 34.8–46.1)
HGB BLD-MCNC: 14 G/DL (ref 11.5–15.4)
MCH RBC QN AUTO: 30.7 PG (ref 26.8–34.3)
MCHC RBC AUTO-ENTMCNC: 33.6 G/DL (ref 31.4–37.4)
MCV RBC AUTO: 91 FL (ref 82–98)
PLATELET # BLD AUTO: 269 THOUSANDS/UL (ref 149–390)
PMV BLD AUTO: 10.7 FL (ref 8.9–12.7)
POTASSIUM SERPL-SCNC: 4 MMOL/L (ref 3.5–5.3)
PROT SERPL-MCNC: 7.4 G/DL (ref 6.4–8.2)
RBC # BLD AUTO: 4.56 MILLION/UL (ref 3.81–5.12)
SODIUM SERPL-SCNC: 141 MMOL/L (ref 136–145)
TSH SERPL DL<=0.05 MIU/L-ACNC: 1.84 UIU/ML (ref 0.36–3.74)
VIT B12 SERPL-MCNC: 301 PG/ML (ref 100–900)
WBC # BLD AUTO: 7.94 THOUSAND/UL (ref 4.31–10.16)

## 2022-03-30 PROCEDURE — 85027 COMPLETE CBC AUTOMATED: CPT

## 2022-03-30 PROCEDURE — 36415 COLL VENOUS BLD VENIPUNCTURE: CPT

## 2022-03-30 PROCEDURE — 82607 VITAMIN B-12: CPT

## 2022-03-30 PROCEDURE — 84443 ASSAY THYROID STIM HORMONE: CPT

## 2022-03-30 PROCEDURE — 80053 COMPREHEN METABOLIC PANEL: CPT

## 2022-03-30 PROCEDURE — 83036 HEMOGLOBIN GLYCOSYLATED A1C: CPT

## 2022-03-30 PROCEDURE — 86618 LYME DISEASE ANTIBODY: CPT

## 2022-03-31 LAB — B BURGDOR IGG+IGM SER-ACNC: 1

## 2022-04-01 DIAGNOSIS — R20.2 PARESTHESIA OF BOTH HANDS: ICD-10-CM

## 2022-04-01 DIAGNOSIS — E53.8 VITAMIN B12 DEFICIENCY: Primary | ICD-10-CM

## 2022-04-01 DIAGNOSIS — R20.2 PARESTHESIA OF BOTH FEET: ICD-10-CM

## 2022-04-01 RX ORDER — CYANOCOBALAMIN 1000 UG/ML
INJECTION INTRAMUSCULAR; SUBCUTANEOUS
Qty: 6 ML | Refills: 0 | Status: SHIPPED | OUTPATIENT
Start: 2022-04-01 | End: 2022-05-04 | Stop reason: SDUPTHER

## 2022-04-06 ENCOUNTER — CLINICAL SUPPORT (OUTPATIENT)
Dept: FAMILY MEDICINE CLINIC | Facility: CLINIC | Age: 27
End: 2022-04-06
Payer: COMMERCIAL

## 2022-04-06 DIAGNOSIS — E53.8 VITAMIN B12 DEFICIENCY: Primary | ICD-10-CM

## 2022-04-06 PROCEDURE — 96372 THER/PROPH/DIAG INJ SC/IM: CPT

## 2022-04-06 RX ORDER — CYANOCOBALAMIN 1000 UG/ML
1000 INJECTION INTRAMUSCULAR; SUBCUTANEOUS
Status: SHIPPED | OUTPATIENT
Start: 2022-04-06

## 2022-04-06 RX ADMIN — CYANOCOBALAMIN 1000 MCG: 1000 INJECTION INTRAMUSCULAR; SUBCUTANEOUS at 15:44

## 2022-04-13 ENCOUNTER — CLINICAL SUPPORT (OUTPATIENT)
Dept: FAMILY MEDICINE CLINIC | Facility: CLINIC | Age: 27
End: 2022-04-13
Payer: COMMERCIAL

## 2022-04-13 DIAGNOSIS — E53.8 VITAMIN B12 DEFICIENCY: Primary | ICD-10-CM

## 2022-04-13 PROCEDURE — 96372 THER/PROPH/DIAG INJ SC/IM: CPT | Performed by: PHYSICIAN ASSISTANT

## 2022-04-13 RX ADMIN — CYANOCOBALAMIN 1000 MCG: 1000 INJECTION INTRAMUSCULAR; SUBCUTANEOUS at 10:14

## 2022-04-20 ENCOUNTER — CLINICAL SUPPORT (OUTPATIENT)
Dept: FAMILY MEDICINE CLINIC | Facility: CLINIC | Age: 27
End: 2022-04-20
Payer: COMMERCIAL

## 2022-04-20 DIAGNOSIS — E53.8 VITAMIN B12 DEFICIENCY: Primary | ICD-10-CM

## 2022-04-20 PROCEDURE — 96372 THER/PROPH/DIAG INJ SC/IM: CPT | Performed by: PHYSICIAN ASSISTANT

## 2022-04-20 RX ADMIN — CYANOCOBALAMIN 1000 MCG: 1000 INJECTION INTRAMUSCULAR; SUBCUTANEOUS at 08:28

## 2022-04-27 ENCOUNTER — CLINICAL SUPPORT (OUTPATIENT)
Dept: FAMILY MEDICINE CLINIC | Facility: CLINIC | Age: 27
End: 2022-04-27
Payer: COMMERCIAL

## 2022-04-27 DIAGNOSIS — E53.8 VITAMIN B12 DEFICIENCY: Primary | ICD-10-CM

## 2022-04-27 PROCEDURE — 96372 THER/PROPH/DIAG INJ SC/IM: CPT

## 2022-04-27 RX ADMIN — CYANOCOBALAMIN 1000 MCG: 1000 INJECTION INTRAMUSCULAR; SUBCUTANEOUS at 08:21

## 2022-05-04 ENCOUNTER — TELEPHONE (OUTPATIENT)
Dept: FAMILY MEDICINE CLINIC | Facility: CLINIC | Age: 27
End: 2022-05-04

## 2022-05-04 ENCOUNTER — CLINICAL SUPPORT (OUTPATIENT)
Dept: FAMILY MEDICINE CLINIC | Facility: CLINIC | Age: 27
End: 2022-05-04
Payer: COMMERCIAL

## 2022-05-04 DIAGNOSIS — E53.8 VITAMIN B12 DEFICIENCY: ICD-10-CM

## 2022-05-04 DIAGNOSIS — R20.2 PARESTHESIA OF BOTH FEET: ICD-10-CM

## 2022-05-04 DIAGNOSIS — E53.8 VITAMIN B12 DEFICIENCY: Primary | ICD-10-CM

## 2022-05-04 DIAGNOSIS — R20.2 PARESTHESIA OF BOTH HANDS: ICD-10-CM

## 2022-05-04 PROCEDURE — 96372 THER/PROPH/DIAG INJ SC/IM: CPT | Performed by: PHYSICIAN ASSISTANT

## 2022-05-04 RX ORDER — CYANOCOBALAMIN 1000 UG/ML
INJECTION INTRAMUSCULAR; SUBCUTANEOUS
Qty: 6 ML | Refills: 0 | Status: SHIPPED | OUTPATIENT
Start: 2022-05-04 | End: 2022-07-11 | Stop reason: SDUPTHER

## 2022-05-04 RX ADMIN — CYANOCOBALAMIN 1000 MCG: 1000 INJECTION INTRAMUSCULAR; SUBCUTANEOUS at 11:41

## 2022-05-04 NOTE — TELEPHONE ENCOUNTER
Pt aware  She stated she feels more energized, no light headedness  She is having some numbness in her toes but not frequently and it doesn't last too long

## 2022-05-04 NOTE — TELEPHONE ENCOUNTER
Was here for B 12 injection today, it was her fifth one  She was to get them for 6 weeks and then monthly  She will need a new script if you want her to continue or do you want to recheck blood work?

## 2022-05-04 NOTE — TELEPHONE ENCOUNTER
Ok to get blood draw now  Will call in more B12 for her  How are her symptoms? Is there any improvement yet?

## 2022-05-11 ENCOUNTER — CLINICAL SUPPORT (OUTPATIENT)
Dept: FAMILY MEDICINE CLINIC | Facility: CLINIC | Age: 27
End: 2022-05-11
Payer: COMMERCIAL

## 2022-05-11 DIAGNOSIS — E53.8 VITAMIN B12 DEFICIENCY: Primary | ICD-10-CM

## 2022-05-11 PROCEDURE — 96372 THER/PROPH/DIAG INJ SC/IM: CPT | Performed by: PHYSICIAN ASSISTANT

## 2022-05-11 RX ADMIN — CYANOCOBALAMIN 1000 MCG: 1000 INJECTION INTRAMUSCULAR; SUBCUTANEOUS at 11:28

## 2022-06-07 ENCOUNTER — CLINICAL SUPPORT (OUTPATIENT)
Dept: FAMILY MEDICINE CLINIC | Facility: CLINIC | Age: 27
End: 2022-06-07
Payer: COMMERCIAL

## 2022-06-07 DIAGNOSIS — E53.8 VITAMIN B12 DEFICIENCY: Primary | ICD-10-CM

## 2022-06-07 PROCEDURE — 96372 THER/PROPH/DIAG INJ SC/IM: CPT | Performed by: PHYSICIAN ASSISTANT

## 2022-06-07 RX ADMIN — CYANOCOBALAMIN 1000 MCG: 1000 INJECTION INTRAMUSCULAR; SUBCUTANEOUS at 10:46

## 2022-06-20 ENCOUNTER — OFFICE VISIT (OUTPATIENT)
Dept: OBGYN CLINIC | Facility: CLINIC | Age: 27
End: 2022-06-20
Payer: COMMERCIAL

## 2022-06-20 VITALS
WEIGHT: 255.4 LBS | BODY MASS INDEX: 45.25 KG/M2 | HEIGHT: 63 IN | DIASTOLIC BLOOD PRESSURE: 72 MMHG | SYSTOLIC BLOOD PRESSURE: 112 MMHG

## 2022-06-20 DIAGNOSIS — Z01.419 ROUTINE GYNECOLOGICAL EXAMINATION: ICD-10-CM

## 2022-06-20 DIAGNOSIS — Z01.419 ENCOUNTER FOR WELL WOMAN EXAM WITH ROUTINE GYNECOLOGICAL EXAM: Primary | ICD-10-CM

## 2022-06-20 PROCEDURE — G0145 SCR C/V CYTO,THINLAYER,RESCR: HCPCS | Performed by: OBSTETRICS & GYNECOLOGY

## 2022-06-20 PROCEDURE — S0612 ANNUAL GYNECOLOGICAL EXAMINA: HCPCS | Performed by: OBSTETRICS & GYNECOLOGY

## 2022-06-20 NOTE — PROGRESS NOTES
[facility]  [address], [place]    ASSESSMENT/PLAN: Milli Elam is a 27 y o  Alesharlene Said who presents for annual gynecologic exam     Encounter for routine gynecologic examination  - Routine well woman exam completed today  - Cervical Cancer Screening: Current ASCCP Guidelines reviewed  Last Pap: 2019  Next Pap Due: today  - Contraceptive counseling discussed  Current contraception: none     Additional problems addressed during this visit:  1  Encounter for well woman exam with routine gynecological exam    2  Routine gynecological examination  -     Liquid-based pap, screening      CC: Annual Gynecologic Examination    HPI: Milli Elam is a 27 y o  Andrew Said who presents for annual gynecologic examination  HPI  She reports  no new changes to her health  She reports no breast concerns  She gets regular periods  She has no vaginal discharge, vulvar or vaginal lesions, pelvic pain, or abnormal bleeding  She has no sexual health concerns and is currently sexually active with one male partner  She is considering another pregnancy this year  Will likely want a RLTCS at term  Consider anesthesia consult prior  The following portions of the patient's history were reviewed and updated as appropriate: allergies, current medications, past family history, past medical history, obstetric history, gynecologic history, past social history, past surgical history and problem list     Review of Systems   Constitutional: Negative  HENT: Negative  Eyes: Negative  Respiratory: Negative  Cardiovascular: Negative  Gastrointestinal: Negative  Genitourinary: Negative  Musculoskeletal: Negative  All other systems reviewed and are negative  Objective:  /72   Ht 5' 3" (1 6 m)   Wt 116 kg (255 lb 6 4 oz)   LMP 06/02/2022 (Approximate)   BMI 45 24 kg/m²    Physical Exam  Vitals reviewed     Constitutional:       General: She is not in acute distress  Appearance: She is well-developed  HENT:      Head: Normocephalic and atraumatic  Nose: Nose normal    Cardiovascular:      Rate and Rhythm: Normal rate  Pulmonary:      Effort: Pulmonary effort is normal  No respiratory distress  Chest:   Breasts: Breasts are symmetrical       Right: Normal  No mass, nipple discharge, skin change, tenderness, axillary adenopathy or supraclavicular adenopathy  Left: Normal  No mass, nipple discharge, skin change, tenderness, axillary adenopathy or supraclavicular adenopathy  Abdominal:      General: There is no distension  Palpations: Abdomen is soft  There is no mass  Tenderness: There is no abdominal tenderness  There is no guarding or rebound  Genitourinary:     General: Normal vulva  Exam position: Lithotomy position  Labia:         Right: No lesion  Left: No lesion  Urethra: No prolapse (urethral meatus normal)  Vagina: Normal  No vaginal discharge, erythema or bleeding  Cervix: Normal       Uterus: Normal        Adnexa: Right adnexa normal and left adnexa normal    Musculoskeletal:         General: Normal range of motion  Cervical back: Normal range of motion  Lymphadenopathy:      Upper Body:      Right upper body: No supraclavicular, axillary or pectoral adenopathy  Left upper body: No supraclavicular, axillary or pectoral adenopathy  Lower Body: No right inguinal adenopathy  No left inguinal adenopathy  Skin:     General: Skin is warm and dry  Neurological:      Mental Status: She is alert and oriented to person, place, and time  Psychiatric:         Behavior: Behavior normal          Thought Content:  Thought content normal          Judgment: Judgment normal

## 2022-06-27 LAB
LAB AP GYN PRIMARY INTERPRETATION: NORMAL
Lab: NORMAL
PATH INTERP SPEC-IMP: NORMAL

## 2022-06-28 DIAGNOSIS — B96.89 BV (BACTERIAL VAGINOSIS): Primary | ICD-10-CM

## 2022-06-28 DIAGNOSIS — N76.0 BV (BACTERIAL VAGINOSIS): Primary | ICD-10-CM

## 2022-06-28 RX ORDER — METRONIDAZOLE 500 MG/1
500 TABLET ORAL EVERY 12 HOURS SCHEDULED
Qty: 14 TABLET | Refills: 0 | Status: SHIPPED | OUTPATIENT
Start: 2022-06-28 | End: 2022-07-05

## 2022-07-08 ENCOUNTER — APPOINTMENT (OUTPATIENT)
Dept: LAB | Facility: MEDICAL CENTER | Age: 27
End: 2022-07-08
Payer: COMMERCIAL

## 2022-07-08 DIAGNOSIS — E53.8 VITAMIN B12 DEFICIENCY: ICD-10-CM

## 2022-07-08 LAB — VIT B12 SERPL-MCNC: 530 PG/ML (ref 100–900)

## 2022-07-08 PROCEDURE — 36415 COLL VENOUS BLD VENIPUNCTURE: CPT

## 2022-07-08 PROCEDURE — 82607 VITAMIN B-12: CPT

## 2022-07-11 DIAGNOSIS — E53.8 VITAMIN B12 DEFICIENCY: ICD-10-CM

## 2022-07-11 DIAGNOSIS — R20.2 PARESTHESIA OF BOTH HANDS: ICD-10-CM

## 2022-07-11 DIAGNOSIS — R20.2 PARESTHESIA OF BOTH FEET: ICD-10-CM

## 2022-07-11 RX ORDER — CYANOCOBALAMIN 1000 UG/ML
INJECTION, SOLUTION INTRAMUSCULAR; SUBCUTANEOUS
Qty: 6 ML | Refills: 0 | Status: SHIPPED | OUTPATIENT
Start: 2022-07-11 | End: 2022-09-21 | Stop reason: SDUPTHER

## 2022-07-20 ENCOUNTER — CLINICAL SUPPORT (OUTPATIENT)
Dept: FAMILY MEDICINE CLINIC | Facility: CLINIC | Age: 27
End: 2022-07-20
Payer: COMMERCIAL

## 2022-07-20 DIAGNOSIS — E53.8 VITAMIN B12 DEFICIENCY: Primary | ICD-10-CM

## 2022-07-20 PROCEDURE — 96372 THER/PROPH/DIAG INJ SC/IM: CPT | Performed by: PHYSICIAN ASSISTANT

## 2022-07-20 RX ADMIN — CYANOCOBALAMIN 1000 MCG: 1000 INJECTION, SOLUTION INTRAMUSCULAR; SUBCUTANEOUS at 11:47

## 2022-08-24 ENCOUNTER — CLINICAL SUPPORT (OUTPATIENT)
Dept: FAMILY MEDICINE CLINIC | Facility: CLINIC | Age: 27
End: 2022-08-24
Payer: COMMERCIAL

## 2022-08-24 DIAGNOSIS — E53.8 VITAMIN B12 DEFICIENCY: Primary | ICD-10-CM

## 2022-08-24 PROCEDURE — 96372 THER/PROPH/DIAG INJ SC/IM: CPT | Performed by: PHYSICIAN ASSISTANT

## 2022-08-24 RX ADMIN — CYANOCOBALAMIN 1000 MCG: 1000 INJECTION, SOLUTION INTRAMUSCULAR; SUBCUTANEOUS at 10:04

## 2022-09-08 ENCOUNTER — TELEPHONE (OUTPATIENT)
Dept: FAMILY MEDICINE CLINIC | Facility: CLINIC | Age: 27
End: 2022-09-08

## 2022-09-08 DIAGNOSIS — F32.A ANXIETY AND DEPRESSION: ICD-10-CM

## 2022-09-08 DIAGNOSIS — F41.9 ANXIETY AND DEPRESSION: ICD-10-CM

## 2022-09-08 RX ORDER — SERTRALINE HYDROCHLORIDE 100 MG/1
100 TABLET, FILM COATED ORAL DAILY
Qty: 90 TABLET | Refills: 0 | Status: SHIPPED | OUTPATIENT
Start: 2022-09-08

## 2022-09-08 RX ORDER — SERTRALINE HYDROCHLORIDE 25 MG/1
25 TABLET, FILM COATED ORAL DAILY
Qty: 90 TABLET | Refills: 0 | Status: SHIPPED | OUTPATIENT
Start: 2022-09-08

## 2022-09-08 NOTE — TELEPHONE ENCOUNTER
Will put in referral to weight mgmt  Also, sertraline does not come in 125 mg so will call in 100 mg and 25 mg tabs and she is to take one of each daily  When is her next appt?      ----- Message from Elizabeth Velasquez sent at 9/6/2022  7:02 AM EDT -----  Regarding: FW: Weight Dr    ----- Message -----  From: Allison Juarez  Sent: 9/4/2022   5:52 PM EDT  To: REESE PAREDES [facility]  Subject: Weight Dr Magali Bowie, I'm concerned about my weight and I really think I need to talk to a professional about it    about maybe how I can control it, what changes to make, etc  I wasn't sure if this is something Shaq Godwin can help me with or if maybe she can refer me to someone  Also, I am running low on my Zoloft medication  I was wondering if the dosage could be upped to 125 mg if possible  I think that would be perfect for me   Thank you so much!!

## 2022-09-21 ENCOUNTER — CLINICAL SUPPORT (OUTPATIENT)
Dept: FAMILY MEDICINE CLINIC | Facility: CLINIC | Age: 27
End: 2022-09-21

## 2022-09-21 DIAGNOSIS — R20.2 PARESTHESIA OF BOTH FEET: ICD-10-CM

## 2022-09-21 DIAGNOSIS — E55.9 VITAMIN D DEFICIENCY: Primary | ICD-10-CM

## 2022-09-21 DIAGNOSIS — E53.8 VITAMIN B12 DEFICIENCY: ICD-10-CM

## 2022-09-21 DIAGNOSIS — R20.2 PARESTHESIA OF BOTH HANDS: ICD-10-CM

## 2022-09-21 RX ORDER — CYANOCOBALAMIN 1000 UG/ML
INJECTION, SOLUTION INTRAMUSCULAR; SUBCUTANEOUS
Qty: 6 ML | Refills: 0 | Status: SHIPPED | OUTPATIENT
Start: 2022-09-21 | End: 2022-10-12 | Stop reason: SDUPTHER

## 2022-09-21 RX ADMIN — CYANOCOBALAMIN 1000 MCG: 1000 INJECTION, SOLUTION INTRAMUSCULAR; SUBCUTANEOUS at 09:43

## 2022-10-12 DIAGNOSIS — R20.2 PARESTHESIA OF BOTH HANDS: ICD-10-CM

## 2022-10-12 DIAGNOSIS — E53.8 VITAMIN B12 DEFICIENCY: ICD-10-CM

## 2022-10-12 DIAGNOSIS — R20.2 PARESTHESIA OF BOTH FEET: ICD-10-CM

## 2022-10-12 RX ORDER — CYANOCOBALAMIN 1000 UG/ML
INJECTION, SOLUTION INTRAMUSCULAR; SUBCUTANEOUS
Qty: 6 ML | Refills: 0 | Status: SHIPPED | OUTPATIENT
Start: 2022-10-12

## 2022-10-19 ENCOUNTER — CLINICAL SUPPORT (OUTPATIENT)
Dept: FAMILY MEDICINE CLINIC | Facility: CLINIC | Age: 27
End: 2022-10-19
Payer: COMMERCIAL

## 2022-10-19 DIAGNOSIS — E53.8 VITAMIN B12 DEFICIENCY: Primary | ICD-10-CM

## 2022-10-19 PROCEDURE — 96372 THER/PROPH/DIAG INJ SC/IM: CPT | Performed by: PHYSICIAN ASSISTANT

## 2022-10-19 RX ADMIN — CYANOCOBALAMIN 1000 MCG: 1000 INJECTION, SOLUTION INTRAMUSCULAR; SUBCUTANEOUS at 10:21

## 2022-10-24 ENCOUNTER — TELEPHONE (OUTPATIENT)
Dept: FAMILY MEDICINE CLINIC | Facility: CLINIC | Age: 27
End: 2022-10-24

## 2022-10-24 NOTE — TELEPHONE ENCOUNTER
This is not typical with b12 shot, would recommend OV if it continues  ----- Message from Elisa Granados sent at 10/24/2022  6:48 AM EDT -----  Regarding: FW: B12 shot     ----- Message -----  From: Kiki Guadarrama  Sent: 10/23/2022  12:43 AM EDT  To: REESE FLORESREGGIE [facility]  Subject: B12 shot                                         I recently got a b12 shot on Wednesday  Ever since then Nitesh been feeling sick  I feel nauseous all day and I have diarrhea  Is this normal? Nitesh been getting the shots for awhile now and this is my first time having any side effects

## 2022-10-26 ENCOUNTER — APPOINTMENT (OUTPATIENT)
Dept: LAB | Facility: MEDICAL CENTER | Age: 27
End: 2022-10-26

## 2022-10-26 ENCOUNTER — OFFICE VISIT (OUTPATIENT)
Dept: FAMILY MEDICINE CLINIC | Facility: CLINIC | Age: 27
End: 2022-10-26
Payer: COMMERCIAL

## 2022-10-26 VITALS
SYSTOLIC BLOOD PRESSURE: 108 MMHG | BODY MASS INDEX: 47.13 KG/M2 | WEIGHT: 266 LBS | HEART RATE: 86 BPM | TEMPERATURE: 97.7 F | OXYGEN SATURATION: 97 % | HEIGHT: 63 IN | DIASTOLIC BLOOD PRESSURE: 80 MMHG

## 2022-10-26 DIAGNOSIS — R19.7 DIARRHEA, UNSPECIFIED TYPE: ICD-10-CM

## 2022-10-26 DIAGNOSIS — R11.0 NAUSEA: Primary | ICD-10-CM

## 2022-10-26 DIAGNOSIS — R11.0 NAUSEA: ICD-10-CM

## 2022-10-26 DIAGNOSIS — E53.8 VITAMIN B12 DEFICIENCY: ICD-10-CM

## 2022-10-26 LAB
ALBUMIN SERPL BCP-MCNC: 3.5 G/DL (ref 3.5–5)
ALP SERPL-CCNC: 74 U/L (ref 46–116)
ALT SERPL W P-5'-P-CCNC: 31 U/L (ref 12–78)
ANION GAP SERPL CALCULATED.3IONS-SCNC: 6 MMOL/L (ref 4–13)
AST SERPL W P-5'-P-CCNC: 11 U/L (ref 5–45)
B-HCG SERPL-ACNC: <2 MIU/ML
BILIRUB SERPL-MCNC: 0.22 MG/DL (ref 0.2–1)
BUN SERPL-MCNC: 15 MG/DL (ref 5–25)
CALCIUM SERPL-MCNC: 9.2 MG/DL (ref 8.3–10.1)
CHLORIDE SERPL-SCNC: 110 MMOL/L (ref 96–108)
CO2 SERPL-SCNC: 22 MMOL/L (ref 21–32)
CREAT SERPL-MCNC: 0.77 MG/DL (ref 0.6–1.3)
ERYTHROCYTE [DISTWIDTH] IN BLOOD BY AUTOMATED COUNT: 14.5 % (ref 11.6–15.1)
GFR SERPL CREATININE-BSD FRML MDRD: 108 ML/MIN/1.73SQ M
GLUCOSE SERPL-MCNC: 85 MG/DL (ref 65–140)
HCT VFR BLD AUTO: 41.4 % (ref 34.8–46.1)
HGB BLD-MCNC: 13 G/DL (ref 11.5–15.4)
MCH RBC QN AUTO: 29.1 PG (ref 26.8–34.3)
MCHC RBC AUTO-ENTMCNC: 31.4 G/DL (ref 31.4–37.4)
MCV RBC AUTO: 93 FL (ref 82–98)
PLATELET # BLD AUTO: 278 THOUSANDS/UL (ref 149–390)
PMV BLD AUTO: 10.6 FL (ref 8.9–12.7)
POTASSIUM SERPL-SCNC: 4.1 MMOL/L (ref 3.5–5.3)
PROT SERPL-MCNC: 7.6 G/DL (ref 6.4–8.4)
RBC # BLD AUTO: 4.47 MILLION/UL (ref 3.81–5.12)
SODIUM SERPL-SCNC: 138 MMOL/L (ref 135–147)
VIT B12 SERPL-MCNC: 519 PG/ML (ref 100–900)
WBC # BLD AUTO: 10.64 THOUSAND/UL (ref 4.31–10.16)

## 2022-10-26 PROCEDURE — 99214 OFFICE O/P EST MOD 30 MIN: CPT | Performed by: PHYSICIAN ASSISTANT

## 2022-10-26 NOTE — PROGRESS NOTES
Assessment/Plan:    Problem List Items Addressed This Visit    None     Visit Diagnoses     Nausea    -  Primary    Check labs, check US gallbladder  Relevant Orders    CBC    Comprehensive metabolic panel    hCG, quantitative    US right upper quadrant    Diarrhea, unspecified type        Check labs, check US gallbladder  Relevant Orders    CBC    Comprehensive metabolic panel    hCG, quantitative    US right upper quadrant           Diagnoses and all orders for this visit:    Nausea  Comments:  Check labs, check US gallbladder  Orders:  -     CBC; Future  -     Comprehensive metabolic panel; Future  -     hCG, quantitative; Future  -     US right upper quadrant; Future    Diarrhea, unspecified type  Comments:  Check labs, check US gallbladder  Orders:  -     CBC; Future  -     Comprehensive metabolic panel; Future  -     hCG, quantitative; Future  -     US right upper quadrant; Future      If US negative, would consider HIDA scan to check gallbladder function  Subjective:      Patient ID: Carolina Smith is a 27 y o  female  Pt here today complaining of nausea/diarrhea since 10/19/22  That day, got her Vit B12 shot  She's never had that reaction to a B12 shot before  She says symptoms worsen with food  The following portions of the patient's history were reviewed and updated as appropriate:   She has a past medical history of Anxiety, Asthma, Depression, Diet controlled gestational diabetes mellitus (GDM) in third trimester (3/3/2021), and Urinary tract infection  ,  does not have any pertinent problems on file  ,   has a past surgical history that includes pr  delivery only (N/A, 2021)  ,  family history includes Asthma in her mother; Diabetes in her maternal grandmother; Hypertension in her maternal grandmother; Thyroid disease in her maternal grandmother  ,   reports that she has never smoked  She has never used smokeless tobacco  She reports previous alcohol use   She reports that she does not use drugs  ,  has No Known Allergies     Current Outpatient Medications   Medication Sig Dispense Refill   • cyanocobalamin 1,000 mcg/mL Inject 1 mL IM once monthly  6 mL 0   • sertraline (ZOLOFT) 100 mg tablet Take 1 tablet (100 mg total) by mouth daily 90 tablet 0   • sertraline (Zoloft) 25 mg tablet Take 1 tablet (25 mg total) by mouth daily 90 tablet 0     Current Facility-Administered Medications   Medication Dose Route Frequency Provider Last Rate Last Admin   • cyanocobalamin injection 1,000 mcg  1,000 mcg Intramuscular Q7 Days Amanda Johnson PA-C   1,000 mcg at 10/19/22 1021       Review of Systems   Constitutional: Negative for activity change, appetite change, chills, diaphoresis, fatigue, fever and unexpected weight change  HENT: Negative for congestion, ear pain, postnasal drip, rhinorrhea, sinus pressure, sinus pain, sneezing, sore throat, tinnitus and voice change  Eyes: Negative for pain, redness and visual disturbance  Respiratory: Negative for cough, chest tightness, shortness of breath and wheezing  Cardiovascular: Negative for chest pain, palpitations and leg swelling  Gastrointestinal: Positive for diarrhea and nausea  Negative for abdominal pain, blood in stool, constipation and vomiting  Genitourinary: Negative for difficulty urinating, dysuria, frequency, hematuria and urgency  Musculoskeletal: Negative for arthralgias, back pain, gait problem, joint swelling, myalgias, neck pain and neck stiffness  Skin: Negative for color change, pallor, rash and wound  Neurological: Negative for dizziness, tremors, weakness, light-headedness and headaches  Psychiatric/Behavioral: Negative for dysphoric mood, self-injury, sleep disturbance and suicidal ideas  The patient is not nervous/anxious            Objective:  Vitals:    10/26/22 1308   BP: 108/80   BP Location: Left arm   Patient Position: Sitting   Cuff Size: Large   Pulse: 86   Temp: 97 7 °F (36 5 °C) TempSrc: Temporal   SpO2: 97%   Weight: 121 kg (266 lb)   Height: 5' 3" (1 6 m)     Body mass index is 47 12 kg/m²  Physical Exam  Vitals reviewed  Constitutional:       General: She is not in acute distress  Appearance: She is well-developed  She is not diaphoretic  HENT:      Head: Normocephalic and atraumatic  Right Ear: Hearing, tympanic membrane, ear canal and external ear normal       Left Ear: Hearing, tympanic membrane, ear canal and external ear normal       Mouth/Throat:      Pharynx: Uvula midline  No oropharyngeal exudate  Eyes:      General: No scleral icterus  Right eye: No discharge  Left eye: No discharge  Conjunctiva/sclera: Conjunctivae normal    Neck:      Thyroid: No thyromegaly  Vascular: No carotid bruit  Cardiovascular:      Rate and Rhythm: Normal rate and regular rhythm  Heart sounds: Normal heart sounds  No murmur heard  Pulmonary:      Effort: Pulmonary effort is normal  No respiratory distress  Breath sounds: Normal breath sounds  No wheezing  Abdominal:      General: Bowel sounds are normal  There is no distension  Palpations: Abdomen is soft  There is no mass  Tenderness: There is no abdominal tenderness  There is no guarding or rebound  Musculoskeletal:         General: No tenderness  Normal range of motion  Cervical back: Neck supple  Lymphadenopathy:      Cervical: No cervical adenopathy  Skin:     General: Skin is warm and dry  Findings: No erythema or rash  Neurological:      Mental Status: She is alert and oriented to person, place, and time  Psychiatric:         Behavior: Behavior normal          Thought Content:  Thought content normal          Judgment: Judgment normal

## 2022-12-02 DIAGNOSIS — F41.9 ANXIETY AND DEPRESSION: ICD-10-CM

## 2022-12-02 DIAGNOSIS — F32.A ANXIETY AND DEPRESSION: ICD-10-CM

## 2022-12-02 RX ORDER — SERTRALINE HYDROCHLORIDE 25 MG/1
25 TABLET, FILM COATED ORAL DAILY
Qty: 90 TABLET | Refills: 0 | Status: SHIPPED | OUTPATIENT
Start: 2022-12-02

## 2022-12-02 RX ORDER — SERTRALINE HYDROCHLORIDE 100 MG/1
100 TABLET, FILM COATED ORAL DAILY
Qty: 90 TABLET | Refills: 0 | Status: SHIPPED | OUTPATIENT
Start: 2022-12-02

## 2022-12-06 ENCOUNTER — RA CDI HCC (OUTPATIENT)
Dept: OTHER | Facility: HOSPITAL | Age: 27
End: 2022-12-06

## 2022-12-06 NOTE — PROGRESS NOTES
[facility]  coding opportunities          Chart Reviewed number of suggestions sent to Provider: 1   [id number]    Patients Insurance        Commercial Insurance: [employer]

## 2022-12-13 ENCOUNTER — OFFICE VISIT (OUTPATIENT)
Dept: FAMILY MEDICINE CLINIC | Facility: CLINIC | Age: 27
End: 2022-12-13

## 2022-12-13 VITALS
HEART RATE: 98 BPM | OXYGEN SATURATION: 97 % | WEIGHT: 261.6 LBS | SYSTOLIC BLOOD PRESSURE: 110 MMHG | TEMPERATURE: 97.1 F | HEIGHT: 63 IN | DIASTOLIC BLOOD PRESSURE: 78 MMHG | BODY MASS INDEX: 46.35 KG/M2

## 2022-12-13 DIAGNOSIS — E53.8 VITAMIN B12 DEFICIENCY: ICD-10-CM

## 2022-12-13 DIAGNOSIS — S93.401A SPRAIN OF RIGHT ANKLE, UNSPECIFIED LIGAMENT, INITIAL ENCOUNTER: Primary | ICD-10-CM

## 2022-12-13 DIAGNOSIS — F41.9 ANXIETY AND DEPRESSION: ICD-10-CM

## 2022-12-13 DIAGNOSIS — F32.A ANXIETY AND DEPRESSION: ICD-10-CM

## 2022-12-13 RX ORDER — SERTRALINE HYDROCHLORIDE 25 MG/1
25 TABLET, FILM COATED ORAL DAILY
Qty: 90 TABLET | Refills: 0 | Status: SHIPPED | OUTPATIENT
Start: 2022-12-13

## 2022-12-13 RX ORDER — SERTRALINE HYDROCHLORIDE 100 MG/1
100 TABLET, FILM COATED ORAL DAILY
Qty: 90 TABLET | Refills: 0 | Status: SHIPPED | OUTPATIENT
Start: 2022-12-13

## 2022-12-13 RX ORDER — CYANOCOBALAMIN 1000 UG/ML
1000 INJECTION, SOLUTION INTRAMUSCULAR; SUBCUTANEOUS
Qty: 3 ML | Refills: 0 | Status: SHIPPED | OUTPATIENT
Start: 2022-12-13

## 2022-12-13 NOTE — PROGRESS NOTES
Assessment/Plan:    Problem List Items Addressed This Visit        Other    Anxiety and depression    Relevant Medications    sertraline (ZOLOFT) 100 mg tablet    sertraline (Zoloft) 25 mg tablet   Other Visit Diagnoses     Sprain of right ankle, unspecified ligament, initial encounter    -  Primary    Recommend RICE  Vitamin B12 deficiency        Continue injections, symptoms have resolved  Repeat level in 3 months  Relevant Medications    cyanocobalamin 1,000 mcg/mL           Diagnoses and all orders for this visit:    Sprain of right ankle, unspecified ligament, initial encounter  Comments:  Recommend RICE  Vitamin B12 deficiency  Comments:  Continue injections, symptoms have resolved  Repeat level in 3 months  Orders:  -     cyanocobalamin 1,000 mcg/mL; Inject 1 mL (1,000 mcg total) into a muscle every 30 (thirty) days    Anxiety and depression  Comments:  Refill sertrlaine, pt stable on current medication  Orders:  -     sertraline (ZOLOFT) 100 mg tablet; Take 1 tablet (100 mg total) by mouth daily  -     sertraline (Zoloft) 25 mg tablet; Take 1 tablet (25 mg total) by mouth daily    Other orders  -     Multiple Vitamin (MULTIVITAMIN ADULT PO); Take by mouth            Subjective:      Patient ID: Nneka Rodriguez is a 27 y o  female  Theoplis Ripa is here today for routine appointment  Doing well, symptoms of B12 deficiency have resolved, would like to get her level up to 700-800  She got a new job as a  at [employer]  She was walking and had slight inversion injury of R ankle 1 week ago, hurts more by end of day  The following portions of the patient's history were reviewed and updated as appropriate:   She has a past medical history of Anxiety, Asthma, Depression, Diet controlled gestational diabetes mellitus (GDM) in third trimester (3/3/2021), and Urinary tract infection  ,  does not have any pertinent problems on file  ,   has a past surgical history that includes pr  delivery only (N/A, 05/18/2021)  ,  family history includes Asthma in her mother; Diabetes in her maternal grandmother; Hypertension in her maternal grandmother; Thyroid disease in her maternal grandmother  ,   reports that she has never smoked  She has never used smokeless tobacco  She reports that she does not currently use alcohol  She reports that she does not use drugs  ,  has No Known Allergies     Current Outpatient Medications   Medication Sig Dispense Refill   • cyanocobalamin 1,000 mcg/mL Inject 1 mL (1,000 mcg total) into a muscle every 30 (thirty) days 3 mL 0   • Multiple Vitamin (MULTIVITAMIN ADULT PO) Take by mouth     • sertraline (ZOLOFT) 100 mg tablet Take 1 tablet (100 mg total) by mouth daily 90 tablet 0   • sertraline (Zoloft) 25 mg tablet Take 1 tablet (25 mg total) by mouth daily 90 tablet 0     No current facility-administered medications for this visit  Review of Systems   Constitutional: Negative for activity change, appetite change, chills, diaphoresis, fatigue, fever and unexpected weight change  HENT: Negative for congestion, ear pain, postnasal drip, rhinorrhea, sinus pressure, sinus pain, sneezing, sore throat, tinnitus and voice change  Eyes: Negative for pain, redness and visual disturbance  Respiratory: Negative for cough, chest tightness, shortness of breath and wheezing  Cardiovascular: Negative for chest pain, palpitations and leg swelling  Gastrointestinal: Negative for abdominal pain, blood in stool, constipation, diarrhea, nausea and vomiting  Genitourinary: Negative for difficulty urinating, dysuria, frequency, hematuria and urgency  Musculoskeletal: Positive for arthralgias (R ankle)  Negative for back pain, gait problem, joint swelling, myalgias, neck pain and neck stiffness  Skin: Negative for color change, pallor, rash and wound  Neurological: Negative for dizziness, tremors, weakness, light-headedness and headaches     Psychiatric/Behavioral: Negative for dysphoric mood, self-injury, sleep disturbance and suicidal ideas  The patient is not nervous/anxious  Objective:  Vitals:    12/13/22 1019   BP: 110/78   Pulse: 98   Temp: (!) 97 1 °F (36 2 °C)   SpO2: 97%   Weight: 119 kg (261 lb 9 6 oz)   Height: 5' 3" (1 6 m)     Body mass index is 46 34 kg/m²  Physical Exam  Vitals reviewed  Constitutional:       General: She is not in acute distress  Appearance: She is well-developed  She is not diaphoretic  HENT:      Head: Normocephalic and atraumatic  Right Ear: Hearing, tympanic membrane, ear canal and external ear normal       Left Ear: Hearing, tympanic membrane, ear canal and external ear normal       Mouth/Throat:      Pharynx: Uvula midline  No oropharyngeal exudate  Eyes:      General: No scleral icterus  Right eye: No discharge  Left eye: No discharge  Conjunctiva/sclera: Conjunctivae normal    Neck:      Thyroid: No thyromegaly  Vascular: No carotid bruit  Cardiovascular:      Rate and Rhythm: Normal rate and regular rhythm  Heart sounds: Normal heart sounds  No murmur heard  Pulmonary:      Effort: Pulmonary effort is normal  No respiratory distress  Breath sounds: Normal breath sounds  No wheezing  Abdominal:      General: Bowel sounds are normal  There is no distension  Palpations: Abdomen is soft  There is no mass  Tenderness: There is no abdominal tenderness  There is no guarding or rebound  Musculoskeletal:         General: Tenderness (R lateral ankle) present  Normal range of motion  Cervical back: Neck supple  Lymphadenopathy:      Cervical: No cervical adenopathy  Skin:     General: Skin is warm and dry  Findings: No erythema or rash  Neurological:      Mental Status: She is alert and oriented to person, place, and time  Psychiatric:         Behavior: Behavior normal          Thought Content:  Thought content normal          Judgment: Judgment normal

## 2022-12-26 ENCOUNTER — TELEPHONE (OUTPATIENT)
Dept: OTHER | Facility: OTHER | Age: 27
End: 2022-12-26

## 2022-12-27 ENCOUNTER — APPOINTMENT (OUTPATIENT)
Dept: RADIOLOGY | Facility: MEDICAL CENTER | Age: 27
End: 2022-12-27

## 2022-12-27 ENCOUNTER — OFFICE VISIT (OUTPATIENT)
Dept: FAMILY MEDICINE CLINIC | Facility: CLINIC | Age: 27
End: 2022-12-27

## 2022-12-27 VITALS
HEART RATE: 85 BPM | BODY MASS INDEX: 46.92 KG/M2 | OXYGEN SATURATION: 98 % | HEIGHT: 63 IN | SYSTOLIC BLOOD PRESSURE: 116 MMHG | DIASTOLIC BLOOD PRESSURE: 68 MMHG | WEIGHT: 264.8 LBS

## 2022-12-27 DIAGNOSIS — M25.571 CHRONIC PAIN OF RIGHT ANKLE: ICD-10-CM

## 2022-12-27 DIAGNOSIS — G89.29 CHRONIC PAIN OF RIGHT ANKLE: Primary | ICD-10-CM

## 2022-12-27 DIAGNOSIS — E53.8 VITAMIN B12 DEFICIENCY: ICD-10-CM

## 2022-12-27 DIAGNOSIS — M25.571 CHRONIC PAIN OF RIGHT ANKLE: Primary | ICD-10-CM

## 2022-12-27 DIAGNOSIS — G89.29 CHRONIC PAIN OF RIGHT ANKLE: ICD-10-CM

## 2022-12-27 RX ORDER — IBUPROFEN 600 MG/1
600 TABLET ORAL EVERY 8 HOURS PRN
Qty: 30 TABLET | Refills: 1 | Status: SHIPPED | OUTPATIENT
Start: 2022-12-27

## 2022-12-27 RX ORDER — CYANOCOBALAMIN 1000 UG/ML
1000 INJECTION, SOLUTION INTRAMUSCULAR; SUBCUTANEOUS
Status: SHIPPED | OUTPATIENT
Start: 2022-12-27 | End: 2023-03-27

## 2022-12-27 RX ADMIN — CYANOCOBALAMIN 1000 MCG: 1000 INJECTION, SOLUTION INTRAMUSCULAR; SUBCUTANEOUS at 15:27

## 2022-12-27 NOTE — TELEPHONE ENCOUNTER
patient has a sprained ankle, she is requesting a work note to cover for Wednesday, Thursday, and friday December 12/28, 12/29, and 12/30  she states her ankle is worsening and is barely able to work on it by the end of her shifts  Also, she would like an appointment for her b12 shot   Please call to schedule

## 2022-12-27 NOTE — LETTER
December 27, 2022     Patient: Gt Iglesias  YOB: 1995  Date of Visit: 12/27/2022      To Whom it May Concern:    Fili Torres is under my professional care  Elana Martines was seen in my office on 12/27/2022  Elana Martines may return to work on 01/01/2023  If you have any questions or concerns, please don't hesitate to call           Sincerely,            Kalyan Aden PA-C

## 2022-12-27 NOTE — PROGRESS NOTES
Assessment/Plan:    Problem List Items Addressed This Visit    None  Visit Diagnoses     Chronic pain of right ankle    -  Primary    Relevant Medications    ibuprofen (MOTRIN) 600 mg tablet    Other Relevant Orders    XR ankle 3+ vw right    Ambulatory Referral to Orthopedic Surgery    Vitamin B12 deficiency        Relevant Medications    cyanocobalamin injection 1,000 mcg           Diagnoses and all orders for this visit:    Chronic pain of right ankle  -     XR ankle 3+ vw right; Future  -     ibuprofen (MOTRIN) 600 mg tablet; Take 1 tablet (600 mg total) by mouth every 8 (eight) hours as needed for mild pain  -     Ambulatory Referral to Orthopedic Surgery; Future    Vitamin B12 deficiency  -     cyanocobalamin injection 1,000 mcg      Will get xray to rule out any bony abnormalities  Recommended rest, ice, elevation, and compression  Script for ibuprofen  Agreed to give the rest of the week off from work  Referral to orthopedics  Subjective:      Patient ID: Meghana Coleman is a 24 y o  female  Vicky Diop is a pleasant 24year old female who is here today complaining of right ankle pain for the past month  She twisted her ankle at work about a month ago  She works as a  at [employer]  After being on her feet all day she is having a lot of pain and swelling in her ankle  She has tried ice and a brace  She denies any numbness or tingling  The following portions of the patient's history were reviewed and updated as appropriate:   She has a past medical history of Anxiety, Asthma, Depression, Diet controlled gestational diabetes mellitus (GDM) in third trimester (3/3/2021), and Urinary tract infection  ,  does not have any pertinent problems on file  ,   has a past surgical history that includes pr  delivery only (N/A, 2021)  ,  family history includes Asthma in her mother; Diabetes in her maternal grandmother; Hypertension in her maternal grandmother;  Thyroid disease in her maternal grandmother  ,   reports that she has never smoked  She has never used smokeless tobacco  She reports that she does not currently use alcohol  She reports that she does not use drugs  ,  has No Known Allergies     Current Outpatient Medications   Medication Sig Dispense Refill   • cyanocobalamin 1,000 mcg/mL Inject 1 mL (1,000 mcg total) into a muscle every 30 (thirty) days 3 mL 0   • ibuprofen (MOTRIN) 600 mg tablet Take 1 tablet (600 mg total) by mouth every 8 (eight) hours as needed for mild pain 30 tablet 1   • Multiple Vitamin (MULTIVITAMIN ADULT PO) Take by mouth     • sertraline (ZOLOFT) 100 mg tablet Take 1 tablet (100 mg total) by mouth daily 90 tablet 0   • sertraline (Zoloft) 25 mg tablet Take 1 tablet (25 mg total) by mouth daily 90 tablet 0     Current Facility-Administered Medications   Medication Dose Route Frequency Provider Last Rate Last Admin   • cyanocobalamin injection 1,000 mcg  1,000 mcg Intramuscular Q30 Days Arleen Jones PA-C   1,000 mcg at 12/27/22 1527       Review of Systems   Constitutional: Negative for chills, diaphoresis, fatigue and fever  HENT: Negative for congestion, ear pain, postnasal drip, rhinorrhea, sneezing, sore throat and trouble swallowing  Eyes: Negative for pain and visual disturbance  Respiratory: Negative for apnea, cough, shortness of breath and wheezing  Cardiovascular: Negative for chest pain and palpitations  Gastrointestinal: Negative for abdominal pain, constipation, diarrhea, nausea and vomiting  Genitourinary: Negative for dysuria  Musculoskeletal: Positive for arthralgias (right ankle) and gait problem  Negative for myalgias  Neurological: Negative for dizziness, syncope, weakness, light-headedness, numbness and headaches  Psychiatric/Behavioral: Negative for suicidal ideas  The patient is not nervous/anxious            Objective:  Vitals:    12/27/22 1426   BP: 116/68   Pulse: 85   SpO2: 98%   Weight: 120 kg (264 lb 12 8 oz)   Height: 5' 3" (1 6 m)     Body mass index is 46 91 kg/m²  Physical Exam  Vitals and nursing note reviewed  Constitutional:       Appearance: She is well-developed  HENT:      Head: Normocephalic and atraumatic  Right Ear: External ear normal       Left Ear: External ear normal       Nose: Nose normal       Mouth/Throat:      Pharynx: No oropharyngeal exudate or posterior oropharyngeal erythema  Eyes:      Extraocular Movements: Extraocular movements intact  Musculoskeletal:      Right ankle: No swelling or deformity  Tenderness present over the lateral malleolus and medial malleolus  Decreased range of motion  Right Achilles Tendon: Normal    Skin:     General: Skin is warm and dry  Neurological:      Mental Status: She is alert and oriented to person, place, and time  Psychiatric:         Behavior: Behavior normal          Thought Content:  Thought content normal          Judgment: Judgment normal

## 2023-01-10 DIAGNOSIS — E53.8 VITAMIN B12 DEFICIENCY: ICD-10-CM

## 2023-01-10 DIAGNOSIS — F41.9 ANXIETY AND DEPRESSION: ICD-10-CM

## 2023-01-10 DIAGNOSIS — F32.A ANXIETY AND DEPRESSION: ICD-10-CM

## 2023-01-10 RX ORDER — SERTRALINE HYDROCHLORIDE 100 MG/1
100 TABLET, FILM COATED ORAL DAILY
Qty: 90 TABLET | Refills: 0 | Status: SHIPPED | OUTPATIENT
Start: 2023-01-10

## 2023-01-10 RX ORDER — SERTRALINE HYDROCHLORIDE 25 MG/1
25 TABLET, FILM COATED ORAL DAILY
Qty: 90 TABLET | Refills: 0 | Status: SHIPPED | OUTPATIENT
Start: 2023-01-10

## 2023-01-10 RX ORDER — CYANOCOBALAMIN 1000 UG/ML
1000 INJECTION, SOLUTION INTRAMUSCULAR; SUBCUTANEOUS
Qty: 3 ML | Refills: 0 | Status: SHIPPED | OUTPATIENT
Start: 2023-01-10

## 2023-01-17 ENCOUNTER — CLINICAL SUPPORT (OUTPATIENT)
Dept: FAMILY MEDICINE CLINIC | Facility: CLINIC | Age: 28
End: 2023-01-17

## 2023-01-17 DIAGNOSIS — E53.8 VITAMIN B12 DEFICIENCY: Primary | ICD-10-CM

## 2023-01-17 RX ADMIN — CYANOCOBALAMIN 1000 MCG: 1000 INJECTION, SOLUTION INTRAMUSCULAR; SUBCUTANEOUS at 10:38

## 2023-01-30 ENCOUNTER — TELEPHONE (OUTPATIENT)
Dept: FAMILY MEDICINE CLINIC | Facility: CLINIC | Age: 28
End: 2023-01-30

## 2023-01-30 DIAGNOSIS — Z32.00 ENCOUNTER FOR PREGNANCY TEST, RESULT UNKNOWN: Primary | ICD-10-CM

## 2023-01-31 ENCOUNTER — APPOINTMENT (OUTPATIENT)
Dept: LAB | Facility: MEDICAL CENTER | Age: 28
End: 2023-01-31

## 2023-01-31 DIAGNOSIS — Z32.00 ENCOUNTER FOR PREGNANCY TEST, RESULT UNKNOWN: ICD-10-CM

## 2023-01-31 LAB — B-HCG SERPL-ACNC: <2 MIU/ML

## 2023-02-08 ENCOUNTER — CLINICAL SUPPORT (OUTPATIENT)
Dept: FAMILY MEDICINE CLINIC | Facility: CLINIC | Age: 28
End: 2023-02-08

## 2023-02-08 DIAGNOSIS — E55.9 VITAMIN D DEFICIENCY: Primary | ICD-10-CM

## 2023-02-08 DIAGNOSIS — E53.8 VITAMIN B12 DEFICIENCY: ICD-10-CM

## 2023-02-08 RX ADMIN — CYANOCOBALAMIN 1000 MCG: 1000 INJECTION, SOLUTION INTRAMUSCULAR; SUBCUTANEOUS at 10:12

## 2023-02-14 DIAGNOSIS — E55.9 VITAMIN D DEFICIENCY: Primary | ICD-10-CM

## 2023-02-14 DIAGNOSIS — F32.A ANXIETY AND DEPRESSION: ICD-10-CM

## 2023-02-14 DIAGNOSIS — F41.9 ANXIETY AND DEPRESSION: ICD-10-CM

## 2023-02-14 DIAGNOSIS — Z13.89 SCREENING FOR MULTIPLE CONDITIONS: ICD-10-CM

## 2023-02-14 DIAGNOSIS — E53.8 VITAMIN B12 DEFICIENCY: ICD-10-CM

## 2023-02-14 RX ORDER — SERTRALINE HYDROCHLORIDE 25 MG/1
25 TABLET, FILM COATED ORAL DAILY
Qty: 90 TABLET | Refills: 0 | Status: SHIPPED | OUTPATIENT
Start: 2023-02-14

## 2023-02-14 RX ORDER — SERTRALINE HYDROCHLORIDE 100 MG/1
100 TABLET, FILM COATED ORAL DAILY
Qty: 90 TABLET | Refills: 0 | Status: SHIPPED | OUTPATIENT
Start: 2023-02-14

## 2023-02-20 ENCOUNTER — TELEPHONE (OUTPATIENT)
Dept: BARIATRICS | Facility: CLINIC | Age: 28
End: 2023-02-20

## 2023-02-20 NOTE — TELEPHONE ENCOUNTER
TOM that we need to cancel appt for today (2/20/23)  Asked to call [phone] to reschedule consult with MAYCO

## 2023-02-24 DIAGNOSIS — Z32.00 ENCOUNTER FOR PREGNANCY TEST, RESULT UNKNOWN: Primary | ICD-10-CM

## 2023-02-25 ENCOUNTER — APPOINTMENT (OUTPATIENT)
Dept: LAB | Facility: MEDICAL CENTER | Age: 28
End: 2023-02-25

## 2023-02-25 DIAGNOSIS — Z32.00 ENCOUNTER FOR PREGNANCY TEST, RESULT UNKNOWN: ICD-10-CM

## 2023-02-25 LAB — B-HCG SERPL-ACNC: <2 MIU/ML

## 2023-02-27 DIAGNOSIS — E55.9 VITAMIN D DEFICIENCY: Primary | ICD-10-CM

## 2023-02-27 RX ORDER — ERGOCALCIFEROL 1.25 MG/1
50000 CAPSULE ORAL WEEKLY
Qty: 12 CAPSULE | Refills: 0 | Status: SHIPPED | OUTPATIENT
Start: 2023-02-27 | End: 2023-03-22 | Stop reason: SDUPTHER

## 2023-03-06 ENCOUNTER — TELEPHONE (OUTPATIENT)
Dept: FAMILY MEDICINE CLINIC | Facility: CLINIC | Age: 28
End: 2023-03-06

## 2023-03-06 DIAGNOSIS — Z32.00 ENCOUNTER FOR PREGNANCY TEST, RESULT UNKNOWN: Primary | ICD-10-CM

## 2023-03-06 NOTE — TELEPHONE ENCOUNTER
ordered    ----- Message from Roni Falcon sent at 3/6/2023  6:46 AM EST -----  Regarding: FW: Pregnancy test   Contact: [phone]    ----- Message -----  From: Suzette Magallon  Sent: 3/5/2023   1:07 PM EST  To: [facility]  Subject: Pregnancy test                                   Good afternoon, I finally got a positive test result  I took 3 and they were all positive so I was wondering if I can get blood work done again   Thank you

## 2023-03-08 ENCOUNTER — APPOINTMENT (OUTPATIENT)
Dept: LAB | Facility: MEDICAL CENTER | Age: 28
End: 2023-03-08

## 2023-03-08 ENCOUNTER — CLINICAL SUPPORT (OUTPATIENT)
Dept: FAMILY MEDICINE CLINIC | Facility: CLINIC | Age: 28
End: 2023-03-08

## 2023-03-08 DIAGNOSIS — E53.8 VITAMIN B12 DEFICIENCY: Primary | ICD-10-CM

## 2023-03-08 DIAGNOSIS — E53.8 VITAMIN B12 DEFICIENCY: ICD-10-CM

## 2023-03-08 DIAGNOSIS — Z32.01 POSITIVE BLOOD PREGNANCY TEST: Primary | ICD-10-CM

## 2023-03-08 DIAGNOSIS — Z32.00 ENCOUNTER FOR PREGNANCY TEST, RESULT UNKNOWN: ICD-10-CM

## 2023-03-08 DIAGNOSIS — E55.9 VITAMIN D DEFICIENCY: ICD-10-CM

## 2023-03-08 LAB — B-HCG SERPL-ACNC: 30 MIU/ML

## 2023-03-08 RX ORDER — CYANOCOBALAMIN 1000 UG/ML
1000 INJECTION, SOLUTION INTRAMUSCULAR; SUBCUTANEOUS
Status: SHIPPED | OUTPATIENT
Start: 2023-03-08

## 2023-03-08 RX ADMIN — CYANOCOBALAMIN 1000 MCG: 1000 INJECTION, SOLUTION INTRAMUSCULAR; SUBCUTANEOUS at 10:05

## 2023-03-15 ENCOUNTER — RA CDI HCC (OUTPATIENT)
Dept: OTHER | Facility: HOSPITAL | Age: 28
End: 2023-03-15

## 2023-03-15 NOTE — PROGRESS NOTES
[facility]  coding opportunities          Chart Reviewed number of suggestions sent to Provider: 2   [id number]  Depression    Patients Insurance        Commercial Insurance: [employer]

## 2023-03-21 NOTE — PROGRESS NOTES
Date:  21  RE: Renard Solorzano    : 1995  Estimated Due Date: 21  EGA: 36w2d  OB/GYN:[facility]   Diet controlled gestational diabetes          mychart  BG results are from -        Current regimen:  2200 Calorie GDM diet with 3 meals & 3 snacks  Reports she is now taking a bedtime snacks every night  Has changed the bedtime snack to 1 CHO serving (15 gms) & 2-3 oz protein  Self-Blood Glucose Monitoring 4 time daily with a One-Touch Verio glucose meter  Walks on a treadmill  Plan:  Continue current regimen      ultrasound indicates normal growth & fluids    Next US 21 [id number] 3%; decreased from 5 4% on 20    Date due to report next:  Weekly on Monday    Divya Zaman  Diabetes Educator  [facility] None

## 2023-03-22 ENCOUNTER — OFFICE VISIT (OUTPATIENT)
Dept: FAMILY MEDICINE CLINIC | Facility: CLINIC | Age: 28
End: 2023-03-22

## 2023-03-22 VITALS
OXYGEN SATURATION: 98 % | WEIGHT: 269.2 LBS | HEIGHT: 63 IN | DIASTOLIC BLOOD PRESSURE: 72 MMHG | HEART RATE: 88 BPM | BODY MASS INDEX: 47.7 KG/M2 | TEMPERATURE: 97.6 F | SYSTOLIC BLOOD PRESSURE: 130 MMHG

## 2023-03-22 DIAGNOSIS — F32.A ANXIETY AND DEPRESSION: ICD-10-CM

## 2023-03-22 DIAGNOSIS — E55.9 VITAMIN D DEFICIENCY: ICD-10-CM

## 2023-03-22 DIAGNOSIS — F41.9 ANXIETY AND DEPRESSION: ICD-10-CM

## 2023-03-22 DIAGNOSIS — Z00.00 WELL ADULT EXAM: Primary | ICD-10-CM

## 2023-03-22 RX ORDER — SERTRALINE HYDROCHLORIDE 25 MG/1
25 TABLET, FILM COATED ORAL DAILY
Qty: 90 TABLET | Refills: 0 | Status: SHIPPED | OUTPATIENT
Start: 2023-03-22

## 2023-03-22 RX ORDER — ERGOCALCIFEROL 1.25 MG/1
50000 CAPSULE ORAL WEEKLY
Qty: 12 CAPSULE | Refills: 0 | Status: SHIPPED | OUTPATIENT
Start: 2023-03-22

## 2023-03-22 RX ORDER — SERTRALINE HYDROCHLORIDE 100 MG/1
100 TABLET, FILM COATED ORAL DAILY
Qty: 90 TABLET | Refills: 0 | Status: SHIPPED | OUTPATIENT
Start: 2023-03-22

## 2023-03-22 NOTE — PROGRESS NOTES
ADULT ANNUAL [address] PRIMARY CARE    NAME: Ray Valenzuela  AGE: 24 y o  SEX: female  : 1995     DATE: 3/22/2023     Assessment and Plan:     Problem List Items Addressed This Visit        Other    Vitamin D deficiency    Relevant Medications    ergocalciferol (VITAMIN D2) 50,000 units    Anxiety and depression    Relevant Medications    sertraline (ZOLOFT) 100 mg tablet    sertraline (Zoloft) 25 mg tablet   Other Visit Diagnoses     Well adult exam    -  Primary          Immunizations and preventive care screenings were discussed with patient today  Appropriate education was printed on patient's after visit summary  Counseling:  Dental Health: discussed importance of regular tooth brushing, flossing, and dental visits  BMI Counseling: Body mass index is 47 69 kg/m²  The BMI is above normal  Nutrition recommendations include decreasing portion sizes, encouraging healthy choices of fruits and vegetables, decreasing fast food intake, consuming healthier snacks, limiting drinks that contain sugar, moderation in carbohydrate intake, increasing intake of lean protein, reducing intake of saturated and trans fat and reducing intake of cholesterol  Exercise recommendations include moderate physical activity 150 minutes/week  Rationale for BMI follow-up plan is due to patient being overweight or obese  Return in about 3 months (around 2023)  Chief Complaint:     Chief Complaint   Patient presents with   • Annual Exam      History of Present Illness:     Adult Annual Physical   Patient here for a comprehensive physical exam  The patient reports some tingling in bilateral hands, L worse than R, tingling is sporadic and only started over the past 2 weeks  Explained that this can happen with pregnancy eventhough she is very early she states she has noted some increased swelling in her hands since being pregnant   Explained that this will hopefully resolve after delivery but we will have to wait until that time       Diet and Physical Activity  Diet/Nutrition: well balanced diet  Exercise: no formal exercise  Depression Screening  PHQ-2/9 Depression Screening    Little interest or pleasure in doing things: 0 - not at all  Feeling down, depressed, or hopeless: 0 - not at all  Trouble falling or staying asleep, or sleeping too much: 0 - not at all  Feeling tired or having little energy: 0 - not at all  Poor appetite or overeatin - not at all  Feeling bad about yourself - or that you are a failure or have let yourself or your family down: 0 - not at all  Trouble concentrating on things, such as reading the newspaper or watching television: 0 - not at all  Moving or speaking so slowly that other people could have noticed  Or the opposite - being so fidgety or restless that you have been moving around a lot more than usual: 0 - not at all  Thoughts that you would be better off dead, or of hurting yourself in some way: 0 - not at all  PHQ-9 Score: 0   PHQ-9 Interpretation: No or Minimal depression        General Health  Sleep: sleeps well  Hearing: normal - bilateral   Vision: goes for regular eye exams  Dental: regular dental visits  Review of Systems:     Review of Systems   Constitutional: Negative for activity change, appetite change, chills, diaphoresis, fatigue, fever and unexpected weight change  HENT: Negative for congestion, ear pain, postnasal drip, rhinorrhea, sinus pressure, sinus pain, sneezing, sore throat, tinnitus and voice change  Eyes: Negative for pain, redness and visual disturbance  Respiratory: Negative for cough, chest tightness, shortness of breath and wheezing  Cardiovascular: Negative for chest pain, palpitations and leg swelling  Gastrointestinal: Negative for abdominal pain, blood in stool, constipation, diarrhea, nausea and vomiting     Genitourinary: Negative for difficulty urinating, dysuria, frequency, hematuria and urgency  Musculoskeletal: Negative for arthralgias, back pain, gait problem, joint swelling, myalgias, neck pain and neck stiffness  Skin: Negative for color change, pallor, rash and wound  Neurological: Negative for dizziness, tremors, weakness, light-headedness and headaches  Sporadic tingling in bilateral hands over the past 2 weeks, L slightly more than R  Psychiatric/Behavioral: Negative for dysphoric mood, self-injury, sleep disturbance and suicidal ideas  The patient is not nervous/anxious  Past Medical History:     Past Medical History:   Diagnosis Date   • Anxiety    • Asthma     as a child   • Depression    • Diet controlled gestational diabetes mellitus (GDM) in third trimester 3/3/2021   • Urinary tract infection       Past Surgical History:     Past Surgical History:   Procedure Laterality Date   • ND  DELIVERY ONLY N/A 2021    Procedure:  SECTION ();   Surgeon: Farhad Patel MD;  Location: [facility];  Service: Obstetrics      Social History:     Social History     Socioeconomic History   • Marital status: /Civil Union     Spouse name: None   • Number of children: None   • Years of education: None   • Highest education level: None   Occupational History   • None   Tobacco Use   • Smoking status: Never   • Smokeless tobacco: Never   Vaping Use   • Vaping Use: Never used   Substance and Sexual Activity   • Alcohol use: Not Currently   • Drug use: Never   • Sexual activity: Yes     Partners: Male     Birth control/protection: Condom Male   Other Topics Concern   • None   Social History Narrative   • None     Social Determinants of Health     Financial Resource Strain: Not on file   Food Insecurity: Not on file   Transportation Needs: Not on file   Physical Activity: Not on file   Stress: Not on file   Social Connections: Not on file   Intimate Partner Violence: Not on file   Housing Stability: Not on file      Family History:     Family History   Problem Relation Age of Onset   • Asthma Mother    • Diabetes Maternal Grandmother    • Hypertension Maternal Grandmother    • Thyroid disease Maternal Grandmother    • Breast cancer Neg Hx    • Colon cancer Neg Hx    • Ovarian cancer Neg Hx       Current Medications:     Current Outpatient Medications   Medication Sig Dispense Refill   • cyanocobalamin 1,000 mcg/mL Inject 1 mL (1,000 mcg total) into a muscle every 30 (thirty) days 3 mL 0   • ergocalciferol (VITAMIN D2) 50,000 units Take 1 capsule (50,000 Units total) by mouth once a week 12 capsule 0   • sertraline (ZOLOFT) 100 mg tablet Take 1 tablet (100 mg total) by mouth daily 90 tablet 0   • sertraline (Zoloft) 25 mg tablet Take 1 tablet (25 mg total) by mouth daily 90 tablet 0     Current Facility-Administered Medications   Medication Dose Route Frequency Provider Last Rate Last Admin   • cyanocobalamin injection 1,000 mcg  1,000 mcg Intramuscular Q30 Days Tamra Gómez PA-C   1,000 mcg at 03/08/23 1005      Allergies:     No Known Allergies   Physical Exam:     /72   Pulse 88   Temp 97 6 °F (36 4 °C)   Ht 5' 3" (1 6 m)   Wt 122 kg (269 lb 3 2 oz)   SpO2 98%   BMI 47 69 kg/m²     Physical Exam  Vitals and nursing note reviewed  Constitutional:       General: She is not in acute distress  Appearance: She is well-developed  HENT:      Head: Normocephalic and atraumatic  Eyes:      Conjunctiva/sclera: Conjunctivae normal    Cardiovascular:      Rate and Rhythm: Normal rate and regular rhythm  Heart sounds: No murmur heard  Pulmonary:      Effort: Pulmonary effort is normal  No respiratory distress  Breath sounds: Normal breath sounds  Abdominal:      Palpations: Abdomen is soft  Tenderness: There is no abdominal tenderness  Musculoskeletal:         General: No swelling  Cervical back: Neck supple  Skin:     General: Skin is warm and dry        Capillary Refill: Capillary refill takes less than 2 seconds  Neurological:      Mental Status: She is alert     Psychiatric:         Mood and Affect: Mood normal               Leena Zhang PA-C   [facility] Admission Reconciliation is Completed  Discharge Reconciliation is Completed

## 2023-03-27 ENCOUNTER — APPOINTMENT (OUTPATIENT)
Dept: LAB | Facility: MEDICAL CENTER | Age: 28
End: 2023-03-27

## 2023-03-27 DIAGNOSIS — Z32.01 POSITIVE BLOOD PREGNANCY TEST: ICD-10-CM

## 2023-03-27 LAB
ABO GROUP BLD: NORMAL
B-HCG SERPL-ACNC: ABNORMAL MIU/ML
BLD GP AB SCN SERPL QL: NEGATIVE
PROGEST SERPL-MCNC: 9.7 NG/ML
RH BLD: POSITIVE
SPECIMEN EXPIRATION DATE: NORMAL

## 2023-03-28 ENCOUNTER — TELEPHONE (OUTPATIENT)
Dept: OBGYN CLINIC | Facility: MEDICAL CENTER | Age: 28
End: 2023-03-28

## 2023-03-28 DIAGNOSIS — Z32.01 POSITIVE BLOOD PREGNANCY TEST: Primary | ICD-10-CM

## 2023-03-31 ENCOUNTER — TELEPHONE (OUTPATIENT)
Dept: OBGYN CLINIC | Facility: MEDICAL CENTER | Age: 28
End: 2023-03-31

## 2023-03-31 ENCOUNTER — HOSPITAL ENCOUNTER (OUTPATIENT)
Dept: ULTRASOUND IMAGING | Facility: HOSPITAL | Age: 28
Discharge: HOME | End: 2023-03-31

## 2023-03-31 DIAGNOSIS — Z34.90 EARLY STAGE OF PREGNANCY: Primary | ICD-10-CM

## 2023-03-31 DIAGNOSIS — Z32.01 POSITIVE BLOOD PREGNANCY TEST: ICD-10-CM

## 2023-03-31 NOTE — TELEPHONE ENCOUNTER
Farzana from [address] called in found significant findings on u/s from 3/31/23   Please review thank you

## 2023-04-06 ENCOUNTER — ULTRASOUND (OUTPATIENT)
Dept: OBGYN CLINIC | Facility: CLINIC | Age: 28
End: 2023-04-06

## 2023-04-06 VITALS
SYSTOLIC BLOOD PRESSURE: 100 MMHG | WEIGHT: 271 LBS | HEIGHT: 63 IN | BODY MASS INDEX: 48.02 KG/M2 | DIASTOLIC BLOOD PRESSURE: 68 MMHG

## 2023-04-06 DIAGNOSIS — O20.0 THREATENED ABORTION: Primary | ICD-10-CM

## 2023-04-06 NOTE — PROGRESS NOTES
"[facility] [address], [address], [place], [place]    Assessment/Plan:  No problem-specific Assessment & Plan notes found for this encounter  Diagnoses and all orders for this visit:    Threatened     discussed viable IUP not identified, but too early to diagnose an early failure  Will follow up with ultrasound as scheduled  Call if bleeding increases or worsens  Subjective:   Xander Meraz is a 24 y o   female  CC: vaginal spotting    HPI: HPI  Patient presents with reports of vaginal spotting  She states her spotting is mostly with wiping  Mild cramping  Patient does report increased nausea and some breast tenderness  This pregnancy was planned and desired  Patient's LMP was 23 which would make her 8w4d  Ultrasound done on 3/31/23 showed gestational sac, yolk sac and fetal pole with no definitive FCA  ROS: Review of Systems   Constitutional: Negative  HENT: Negative  Eyes: Negative  Respiratory: Negative  Cardiovascular: Negative  Gastrointestinal: Negative  Genitourinary: Negative  Musculoskeletal: Negative  All other systems reviewed and are negative  PFSH: The following portions of the patient's history were reviewed and updated as appropriate: allergies, current medications, past family history, past medical history, obstetric history, gynecologic history, past social history, past surgical history and problem list        Objective:  /68 (BP Location: Left arm)   Ht 5' 3\" (1 6 m)   Wt 123 kg (271 lb)   LMP 2023   BMI 48 01 kg/m²    Physical Exam  Vitals reviewed  Constitutional:       Appearance: Normal appearance  Cardiovascular:      Rate and Rhythm: Normal rate  Pulmonary:      Effort: Pulmonary effort is normal  No respiratory distress  Genitourinary:     General: Normal vulva  Exam position: Lithotomy position  Labia:         Right: No tenderness or lesion           Left: No " tenderness or lesion  Neurological:      Mental Status: She is alert  Psychiatric:         Mood and Affect: Mood normal          Behavior: Behavior normal          TVUS done, GS and yolk sac noted, no definitive fetal pole, but area of possible cardiac activity noted

## 2023-04-14 PROBLEM — O03.9 MISCARRIAGE: Status: ACTIVE | Noted: 2023-04-14

## 2023-04-18 ENCOUNTER — TELEPHONE (OUTPATIENT)
Dept: OBGYN CLINIC | Facility: MEDICAL CENTER | Age: 28
End: 2023-04-18

## 2023-04-18 NOTE — TELEPHONE ENCOUNTER
Patient called about the cytotec mediation would like to know if she can switch to taking orally also has a few follow up questions  Please review when you get a chance   Thank you

## 2023-04-18 NOTE — TELEPHONE ENCOUNTER
Called patient to discuss questions -reviewed medication administration  Patient will call with any other questions or concerns

## 2023-04-30 ENCOUNTER — APPOINTMENT (EMERGENCY)
Dept: ULTRASOUND IMAGING | Facility: HOSPITAL | Age: 28
End: 2023-04-30

## 2023-04-30 ENCOUNTER — HOSPITAL ENCOUNTER (EMERGENCY)
Facility: HOSPITAL | Age: 28
Discharge: HOME | End: 2023-04-30
Attending: EMERGENCY MEDICINE | Admitting: EMERGENCY MEDICINE

## 2023-04-30 VITALS
SYSTOLIC BLOOD PRESSURE: 125 MMHG | DIASTOLIC BLOOD PRESSURE: 67 MMHG | HEART RATE: 92 BPM | RESPIRATION RATE: 20 BRPM | TEMPERATURE: 98.6 F | OXYGEN SATURATION: 98 %

## 2023-04-30 DIAGNOSIS — N93.9 VAGINAL BLEEDING: Primary | ICD-10-CM

## 2023-04-30 DIAGNOSIS — O03.4 RETAINED PRODUCTS OF CONCEPTION AFTER MISCARRIAGE: ICD-10-CM

## 2023-04-30 LAB
ABO GROUP BLD: NORMAL
ALBUMIN SERPL BCP-MCNC: 4.1 G/DL (ref 3.5–5)
ALP SERPL-CCNC: 66 U/L (ref 34–104)
ALT SERPL W P-5'-P-CCNC: 13 U/L (ref 7–52)
ANION GAP SERPL CALCULATED.3IONS-SCNC: 9 MMOL/L (ref 4–13)
APTT PPP: 26 SECONDS (ref 23–37)
AST SERPL W P-5'-P-CCNC: 11 U/L (ref 13–39)
ATRIAL RATE: 51 BPM
BASOPHILS # BLD AUTO: 0.03 THOUSANDS/ΜL (ref 0–0.1)
BASOPHILS NFR BLD AUTO: 0 % (ref 0–1)
BILIRUB SERPL-MCNC: 0.23 MG/DL (ref 0.2–1)
BLD GP AB SCN SERPL QL: NEGATIVE
BUN SERPL-MCNC: 13 MG/DL (ref 5–25)
CALCIUM SERPL-MCNC: 9.2 MG/DL (ref 8.4–10.2)
CHLORIDE SERPL-SCNC: 104 MMOL/L (ref 96–108)
CO2 SERPL-SCNC: 24 MMOL/L (ref 21–32)
CREAT SERPL-MCNC: 0.59 MG/DL (ref 0.6–1.3)
EOSINOPHIL # BLD AUTO: 0.07 THOUSAND/ΜL (ref 0–0.61)
EOSINOPHIL NFR BLD AUTO: 1 % (ref 0–6)
ERYTHROCYTE [DISTWIDTH] IN BLOOD BY AUTOMATED COUNT: 14.2 % (ref 11.6–15.1)
GFR SERPL CREATININE-BSD FRML MDRD: 127 ML/MIN/1.73SQ M
GLUCOSE SERPL-MCNC: 101 MG/DL (ref 65–140)
HCT VFR BLD AUTO: 41.5 % (ref 34.8–46.1)
HGB BLD-MCNC: 13.5 G/DL (ref 11.5–15.4)
IMM GRANULOCYTES # BLD AUTO: 0.03 THOUSAND/UL (ref 0–0.2)
IMM GRANULOCYTES NFR BLD AUTO: 0 % (ref 0–2)
INR PPP: 0.93 (ref 0.84–1.19)
LYMPHOCYTES # BLD AUTO: 3 THOUSANDS/ΜL (ref 0.6–4.47)
LYMPHOCYTES NFR BLD AUTO: 28 % (ref 14–44)
MCH RBC QN AUTO: 29.4 PG (ref 26.8–34.3)
MCHC RBC AUTO-ENTMCNC: 32.5 G/DL (ref 31.4–37.4)
MCV RBC AUTO: 90 FL (ref 82–98)
MONOCYTES # BLD AUTO: 0.66 THOUSAND/ΜL (ref 0.17–1.22)
MONOCYTES NFR BLD AUTO: 6 % (ref 4–12)
NEUTROPHILS # BLD AUTO: 6.94 THOUSANDS/ΜL (ref 1.85–7.62)
NEUTS SEG NFR BLD AUTO: 65 % (ref 43–75)
NRBC BLD AUTO-RTO: 0 /100 WBCS
PLATELET # BLD AUTO: 290 THOUSANDS/UL (ref 149–390)
PMV BLD AUTO: 9.8 FL (ref 8.9–12.7)
POTASSIUM SERPL-SCNC: 3.8 MMOL/L (ref 3.5–5.3)
PROT SERPL-MCNC: 7.3 G/DL (ref 6.4–8.4)
PROTHROMBIN TIME: 12.7 SECONDS (ref 11.6–14.5)
QRS AXIS: 74 DEGREES
QRSD INTERVAL: 154 MS
QT INTERVAL: 436 MS
QTC INTERVAL: 397 MS
RBC # BLD AUTO: 4.59 MILLION/UL (ref 3.81–5.12)
RH BLD: POSITIVE
SODIUM SERPL-SCNC: 137 MMOL/L (ref 135–147)
SPECIMEN EXPIRATION DATE: NORMAL
T WAVE AXIS: 51 DEGREES
VENTRICULAR RATE: 50 BPM
WBC # BLD AUTO: 10.73 THOUSAND/UL (ref 4.31–10.16)

## 2023-04-30 RX ORDER — ONDANSETRON 2 MG/ML
4 INJECTION INTRAMUSCULAR; INTRAVENOUS ONCE
Status: COMPLETED | OUTPATIENT
Start: 2023-04-30 | End: 2023-04-30

## 2023-04-30 RX ORDER — MISOPROSTOL 200 UG/1
600 TABLET ORAL ONCE
Status: COMPLETED | OUTPATIENT
Start: 2023-04-30 | End: 2023-04-30

## 2023-04-30 RX ADMIN — SODIUM CHLORIDE, SODIUM LACTATE, POTASSIUM CHLORIDE, AND CALCIUM CHLORIDE 1000 ML: .6; .31; .03; .02 INJECTION, SOLUTION INTRAVENOUS at 11:14

## 2023-04-30 RX ADMIN — ONDANSETRON 4 MG: 2 INJECTION INTRAMUSCULAR; INTRAVENOUS at 11:14

## 2023-04-30 RX ADMIN — MISOPROSTOL 600 MCG: 200 TABLET ORAL at 14:16

## 2023-04-30 NOTE — ED ATTENDING ATTESTATION
2023  I, Monique Hernandez DO, saw and evaluated the patient  I have discussed the patient with the resident/non-physician practitioner and agree with the resident's/non-physician practitioner's findings, Plan of Care, and MDM as documented in the resident's/non-physician practitioner's note, except where noted  All available labs and Radiology studies were reviewed  I was present for key portions of any procedure(s) performed by the resident/non-physician practitioner and I was immediately available to provide assistance  At this point I agree with the current assessment done in the Emergency Department  I have conducted an independent evaluation of this patient a history and physical is as follows:    ED Course  ED Course as of 23 1345   Sun 2023   1223 Hemoglobin: 13 5   1223 HCT: 41 9     82-year-old female  presenting to the emergency department for evaluation of vaginal bleeding  Patient reports last menstrual period 2023  Reports positive urine pregnancy test 3/6/2023  Underwent trimester ultrasound on 3/31/2023  Single IUP with gestational sac, yolk sac, fetal pole, no cardiac activity   IUP of uncertain viability  Follow-up appointment with Dr Ruperto Edward OB/GYN 4/3/2023  Repeat ultrasound 2023:   Single IUP, inadequate 14-day interval growth   No fetal cardiac activity demonstrated   Findings suggest nonviable pregnancy  Follow-up via telemedicine encounter with Dr Rena Cortez OB/GYN 2023   Diagnosed with miscarriage/early pregnancy loss   Medical mgmt with Misoprostol    Plan for surgical management with D&E if medical management failed    Patient with spotting throughout the previous 2 weeks however presents here for significant new vaginal bleeding soaking through 1 pad per hour with clots soaking through her briefs which she has changed already numerous times  Denies syncope does not report nausea and lightheadedness      Physical exam reveals mildly uncomfortable appearing female no distress abdomen soft nontender nondistended lungs clear  Vaginal bleeding present  Patient experienced vasovagal events shortly after ED arrival with heart rate down to the 50s, blood pressure to the 90s  Resident will perform speculum exam which revealed clots but no persistent hemorrhage or evidence of foreign body or laceration  Patient recovered from vasovagal presyncope proceed with uneventful ED course otherwise  No continued significant bleeding after initial evaluation  Transvaginal ultrasound obtained reveals concerns for retained products of conception  Representing failure of medical management Case discussed with OB/GYN on-call via [facility] text who thought reasonable for transfer/inpatient management for surgical intervention versus outpatient management now that she is clinically stable based on patient preference these options were discussed with the patient at the bedside and they elected to go home as they do a follow-up appointment in [facility] scheduled for tomorrow  Bleeding precautions reviewed              Critical Care Time  Procedures

## 2023-04-30 NOTE — DISCHARGE INSTRUCTIONS
Return to the Emergency Department sooner if increased bleeding, pain, fever, vomiting, difficulty breathing or urinating, weakness, dizziness  Please call the provided number for continued evaluation by OB/GYN colleagues if worsening of symptoms do not desirae after administration of Cytotec

## 2023-04-30 NOTE — ED PROVIDER NOTES
History  Chief Complaint   Patient presents with    Vaginal Bleeding     Pt had a miscarrage approx 2 weeks ago and was given medication to pas products of conception  She reports a small amount as of bleeding over the last two weeks  Today she starting with heavy vaginal bleeding starting today she reports that she has soaked through numerous pads that have be saturated in 5 minutes  She has switched to briefs in approx 10 minutes  Susan Camacho is a 19-year-old female who is status post Cytotec administration for missed   On review of electronic medical record as well as discussion with the patient, patient had ultrasonography done earlier in April (2023)  She states at that time she was initiated on medication to help with passage of her miscarriage  She states that she has been having minimal to scant bleeding that have been utilized with a panty liner since administration of medication  She does states that there was a large passage of tissue that she thought was the passage of her terminated pregnancy  She denies any recent abdominal trauma, fevers, chills, nausea, vomiting, abdominal pain  She states that she awoke this morning and had a significant passage of very large blood clots this morning as well as persistence of bleeding and soaking through numerous amounts of pads and undergarments this morning that resulted in her wanting to come to the emergency department for further evaluation of symptoms  Patient states that she feels lightheaded but did not have any episodes of loss of consciousness  She does state that she has an OB/GYN provider that she is planning on following up with tomorrow (2023)        History provided by:  Patient and spouse   used: No    Vaginal Bleeding  Quality:  Bright red, dark red and clots  Severity:  Moderate  Onset quality:  Sudden  Duration:  8 hours  Timing:  Constant  Progression:  Partially resolved  Chronicity: New  Menstrual history:  Regular  Possible pregnancy: no    Relieved by:  Nothing  Worsened by:  Nothing  Ineffective treatments:  None tried  Associated symptoms: no abdominal pain, no back pain, no dysuria and no fever        Prior to Admission Medications   Prescriptions Last Dose Informant Patient Reported? Taking? cyanocobalamin 1,000 mcg/mL   No No   Sig: Inject 1 mL (1,000 mcg total) into a muscle every 30 (thirty) days   ergocalciferol (VITAMIN D2) 50,000 units   No No   Sig: Take 1 capsule (50,000 Units total) by mouth once a week   ibuprofen (MOTRIN) 600 mg tablet   No No   Sig: Take 1 tablet (600 mg total) by mouth every 6 (six) hours as needed for mild pain   miSOPROStol (Cytotec) 200 mcg tablet   No No   Sig: Place four tablets vaginal for miscarriage   sertraline (ZOLOFT) 100 mg tablet   No No   Sig: Take 1 tablet (100 mg total) by mouth daily   sertraline (Zoloft) 25 mg tablet   No No   Sig: Take 1 tablet (25 mg total) by mouth daily      Facility-Administered Medications Last Administration Doses Remaining   cyanocobalamin injection 1,000 mcg 2023  9:36 AM           Past Medical History:   Diagnosis Date    Anxiety     Asthma     as a child    Depression     Diet controlled gestational diabetes mellitus (GDM) in third trimester 3/3/2021    Miscarriage 2023    Urinary tract infection        Past Surgical History:   Procedure Laterality Date    CO  DELIVERY ONLY N/A 2021    Procedure:  SECTION (); Surgeon: Daniel Dixon MD;  Location: [facility];  Service: Obstetrics       Family History   Problem Relation Age of Onset    Asthma Mother     Diabetes Maternal Grandmother     Hypertension Maternal Grandmother     Thyroid disease Maternal Grandmother     Breast cancer Neg Hx     Colon cancer Neg Hx     Ovarian cancer Neg Hx      I have reviewed and agree with the history as documented      E-Cigarette/Vaping    E-Cigarette Use Never User E-Cigarette/Vaping Substances    Nicotine No     THC No     CBD No     Flavoring No     Other No     Unknown No      Social History     Tobacco Use    Smoking status: Never    Smokeless tobacco: Never   Vaping Use    Vaping Use: Never used   Substance Use Topics    Alcohol use: Not Currently    Drug use: Never        Review of Systems   Constitutional: Negative for chills and fever  HENT: Negative for ear pain and sore throat  Eyes: Negative for pain and visual disturbance  Respiratory: Negative for cough and shortness of breath  Cardiovascular: Negative for chest pain and palpitations  Gastrointestinal: Negative for abdominal pain and vomiting  Genitourinary: Positive for vaginal bleeding  Negative for dysuria and hematuria  Musculoskeletal: Negative for arthralgias and back pain  Skin: Negative for color change and rash  Neurological: Negative for seizures and syncope  All other systems reviewed and are negative  Physical Exam  ED Triage Vitals   Temperature Pulse Respirations Blood Pressure SpO2   04/30/23 1121 04/30/23 1115 04/30/23 1115 04/30/23 1121 04/30/23 1115   98 6 °F (37 °C) (!) 54 (!) 30 98/54 97 %      Temp Source Heart Rate Source Patient Position - Orthostatic VS BP Location FiO2 (%)   04/30/23 1121 04/30/23 1115 -- 04/30/23 1121 --   Temporal Monitor  Right arm       Pain Score       --                    Orthostatic Vital Signs  Vitals:    04/30/23 1121 04/30/23 1315 04/30/23 1330 04/30/23 1400   BP: 98/54 130/76 126/77 125/67   Pulse: 83 91 94 92       Physical Exam  Vitals and nursing note reviewed  Constitutional:       General: She is in acute distress  Appearance: Normal appearance  She is well-developed  She is obese  She is not ill-appearing or diaphoretic  HENT:      Head: Normocephalic and atraumatic  Right Ear: External ear normal       Left Ear: External ear normal       Nose: Nose normal  No congestion        Mouth/Throat: Mouth: Mucous membranes are moist       Pharynx: No oropharyngeal exudate or posterior oropharyngeal erythema  Eyes:      General:         Right eye: No discharge  Left eye: No discharge  Extraocular Movements: Extraocular movements intact  Conjunctiva/sclera: Conjunctivae normal       Pupils: Pupils are equal, round, and reactive to light  Cardiovascular:      Rate and Rhythm: Normal rate and regular rhythm  Pulses: Normal pulses  Heart sounds: Normal heart sounds  No murmur heard  Pulmonary:      Effort: Pulmonary effort is normal  No respiratory distress  Breath sounds: Normal breath sounds  Abdominal:      Palpations: Abdomen is soft  Tenderness: There is no abdominal tenderness  Genitourinary:     General: Normal vulva  Rectum: Normal       Comments: On inspection with speculum examination, multitude of smaller blood clots noted in the vaginal vault  No active extravasation  Mild amount of clots also noted at the cervix  No tissue or purulent discharge appreciated on examination  Musculoskeletal:         General: No swelling, deformity or signs of injury  Cervical back: Normal range of motion and neck supple  No rigidity  Right lower leg: No edema  Left lower leg: No edema  Skin:     General: Skin is warm and dry  Capillary Refill: Capillary refill takes less than 2 seconds  Neurological:      General: No focal deficit present  Mental Status: She is alert and oriented to person, place, and time     Psychiatric:         Mood and Affect: Mood normal          ED Medications  Medications   ondansetron (ZOFRAN) injection 4 mg (4 mg Intravenous Given 4/30/23 1114)   lactated ringers bolus 1,000 mL (0 mL Intravenous Stopped 4/30/23 1420)   miSOPROStol (Cytotec) tablet 600 mcg (600 mcg Vaginal Given 4/30/23 1416)       Diagnostic Studies  Results Reviewed     Procedure Component Value Units Date/Time    Comprehensive metabolic panel [[id number]]  (Abnormal) Collected: 04/30/23 1118    Lab Status: Final result Specimen: Blood from Arm, Left Updated: 04/30/23 1139     Sodium 137 mmol/L      Potassium 3 8 mmol/L      Chloride 104 mmol/L      CO2 24 mmol/L      ANION GAP 9 mmol/L      BUN 13 mg/dL      Creatinine 0 59 mg/dL      Glucose 101 mg/dL      Calcium 9 2 mg/dL      AST 11 U/L      ALT 13 U/L      Alkaline Phosphatase 66 U/L      Total Protein 7 3 g/dL      Albumin 4 1 g/dL      Total Bilirubin 0 23 mg/dL      eGFR 127 ml/min/1 73sq m     Narrative:      National Kidney Disease Foundation guidelines for Chronic Kidney Disease (CKD):     Stage 1 with normal or high GFR (GFR > 90 mL/min/1 73 square meters)    Stage 2 Mild CKD (GFR = 60-89 mL/min/1 73 square meters)    Stage 3A Moderate CKD (GFR = 45-59 mL/min/1 73 square meters)    Stage 3B Moderate CKD (GFR = 30-44 mL/min/1 73 square meters)    Stage 4 Severe CKD (GFR = 15-29 mL/min/1 73 square meters)    Stage 5 End Stage CKD (GFR <15 mL/min/1 73 square meters)  Note: GFR calculation is accurate only with a steady state creatinine    Protime-INR [[id number]]  (Normal) Collected: 04/30/23 1118    Lab Status: Final result Specimen: Blood from Arm, Left Updated: 04/30/23 1135     Protime 12 7 seconds      INR 0 93    APTT [[id number]]  (Normal) Collected: 04/30/23 1118    Lab Status: Final result Specimen: Blood from Arm, Left Updated: 04/30/23 1135     PTT 26 seconds     CBC and differential [[id number]]  (Abnormal) Collected: 04/30/23 1118    Lab Status: Final result Specimen: Blood from Arm, Left Updated: 04/30/23 1124     WBC 10 73 Thousand/uL      RBC 4 59 Million/uL      Hemoglobin 13 5 g/dL      Hematocrit 41 5 %      MCV 90 fL      MCH 29 4 pg      MCHC 32 5 g/dL      RDW 14 2 %      MPV 9 8 fL      Platelets 658 Thousands/uL      nRBC 0 /100 WBCs      Neutrophils Relative 65 %      Immat GRANS % 0 %      Lymphocytes Relative 28 %      Monocytes Relative 6 %      Eosinophils Relative 1 %      Basophils Relative 0 %      Neutrophils Absolute 6 94 Thousands/µL      Immature Grans Absolute 0 03 Thousand/uL      Lymphocytes Absolute 3 00 Thousands/µL      Monocytes Absolute 0 66 Thousand/µL      Eosinophils Absolute 0 07 Thousand/µL      Basophils Absolute 0 03 Thousands/µL                  US pelvis complete w transvaginal   ED Interpretation by Noreen Echavarria MD (04/30 6347)      FINDINGS:     UTERUS:  The uterus is anteverted in position, measuring 13 8 x 6 7 x 7 9 cm  The uterus has a normal contour and echotexture  The cervix appears within normal limits      ENDOMETRIUM:  The endometrial echo complex has an AP caliber of 31 0 mm  There is distention with heterogeneous debris and an elongated gestational sac which measures 3 2 x 1 0 x 2 0 cm  There is no discrete fetal pole or yolk sac identified as previously seen  There is hypervascularity with areas in the endometrial canal   measuring greater than 21 cm/s and consistent with trophoblastic flow      OVARIES/ADNEXA:  Right ovary:  3 2 x 2 6 x 2 2 cm  9 9 mL  Left ovary:  2 4 x 2 8 x 1 4 cm  4 9 mL  Ovarian Doppler flow is within normal limits  No suspicious ovarian or adnexal abnormality      OTHER:  No free fluid or loculated fluid collections         IMPRESSION:     Findings consistent with retained products of conception, as described above and as clinically questioned               Workstatio   n performed: [id number]      Final Result by Oli Whyte MD (04/30 1319)      Findings consistent with retained products of conception, as described above and as clinically questioned                 Workstation performed: [id number]               Procedures  ECG 12 Lead Documentation Only    Date/Time: 4/30/2023 3:45 PM  Performed by: Noreen Echavarria MD  Authorized by: Noreen Echavarria MD     Indications / Diagnosis:  Hypotension diaphoresis and bradycardia  ECG reviewed by me, the ED Provider: yes "  Patient location:  ED  Previous ECG:     Previous ECG:  Unavailable    Comparison to cardiac monitor: Yes    Interpretation:     Interpretation: abnormal    Quality:     Tracing quality:  Limited by artifact  Rate:     ECG rate:  50    ECG rate assessment: bradycardic    Rhythm:     Rhythm: sinus bradycardia    Ectopy:     Ectopy: none    QRS:     QRS axis:  Normal    QRS intervals:  Normal  Conduction:     Conduction: normal    ST segments:     ST segments:  Normal          ED Course  ED Course as of 23 1557   Sun 2023   1356 After discussion with on-call OB/GYN provider, it was reasonable for the patient be discharged home with close follow-up in the office the following day  These options were provided to the patient at the bedside who agreed that she wished to be discharged home and to follow-up with her office evaluation  Recommendations on further care were provided by OB/GYN provider: \"Would admin 600mcg of cytotec prior to discharge (buccal or vaginal route preferred)  It SHOULD be in your pharmacy, I think all hospitals in [facility] carry the basic OB hemorrhage meds just in case  If not for some reason, we can send to outpatient pharmacy for self admin     that her bleeding and cramping may temporarily increase as the rest of the products expel, but should start tapering after  May use ibuprofen 600mg q6hr for pain  That's perfect that she has an appt tomorrow; we will repeat US and if all goes well, confirm completion of miscarriage\" -Dr Kaylee Levy presents to the emergency department with persistent vaginal bleeding is concerning for potential retained products of conception      DDx including but not limited to: ectopic pregnancy, threatened , missed , incomplete , anemia, coagulopathy, DUB, tumor, retained products of conception, PCOS; doubt ovarian torsion or ruptured " ovarian cyst      Based on initial presenting complaints, IV access was obtained and laboratories IV evaluation was initiated  Patient did have an episode in which she became diaphoretic, bradycardic, and had decreased responsiveness  This appeared consistent with a vagal episode as the patient responded well with antiemetic therapy and fluid administration with return of normal pulse rate as well as response in blood pressure  Laboratory evaluation was notable for a hemoglobin that had remained constant at 13  She was not anemic despite multiple days of mild bleeding as well as a large passage of blood clot  Remainder of laboratory evaluation was grossly unremarkable  Ultrasonography of the uterus with transvaginal approach was notable for the presence of findings consistent with retained products of conception  Secondary to the patient's increased bleeding as well as retained products of conception, was discussed with on-call OB/GYN provider- Dr Herrera Setting  After discussion with this provider on the phone, it was determined that the patient would be agreeable for discharge home  After discussion of her symptoms as well as her progression of symptoms that brought her to today's emergency department visit, Dr Andree Rosado had mentioned that she feels that the episode in the emergency department was secondary to a vagal episode as larger clots are attempting to pass through the cervix  She states based off of her current hemoglobin level, laboratory evaluation, and current clinical stability, it would be reasonable for the patient to receive a dosage of vaginal Cytotec here in the emergency department with the anticipation of being evaluated by her OB/GYN provider the next day for continued evaluation and monitoring of symptoms  She states that it would also not be unreasonable for the patient to be transferred but should be deferred secondary to the patient's expressed desire    She states that the patient wished to be transferred over to the [facility], she would gladly accept this patient  Conversation with regards to the provided options for further management of her vaginal bleeding as well as retained products of conception were discussed with the patient at length  She was describes the utilization of Cytotec as well as the potential for worsening of vaginal bleeding as her uterus was contracted and expelled the remaining contents in anticipation for medicine administration  Patient was counseled on the potential for worsening of bleeding in the short-term setting as the body was expelling the remaining of her retained tissue and return precautions with her regards to returning to the emergency department for further evaluation and management of her symptoms were also discussed at the bedside  The potential to be transferred to the [facility] was offered to the patient with regards to the potential for surgical intervention, continued monitoring, and medication administration in the inpatient setting  Patient expressed good insight into the pros and cons of transfer versus discharge home  She stated that she would prefer to be discharged home with a medication therapy with anticipation to follow-up with her OB/GYN providers in the next day  Based on the conversation with the OB/GYN team at the [facility], this was deemed to be reasonable the patient was provided with medications and discharge paperwork along with strict return precautions to return if there are any worsening of symptoms  Both patient and spouse expressed understanding with this plan  Further documentation on the proposed medication therapy by OB/GYN that was exchanged over encrypted, Tiger text communication is included in the ED course of this note    Counseling and medication administration were conducted as specified by the OB/GYN team     Retained products of conception after miscarriage: acute illness or injury  Vaginal bleeding: acute illness or injury  Amount and/or Complexity of Data Reviewed  Independent Historian: spouse     Details: Spouse was present at the bedside and was able to provide additional history for this patient's presentation  External Data Reviewed: notes  Details: Reviewed previous occasions and evaluations conducted by OB/GYN providers in the outpatient setting  Independently reviewed these notes to further escalate the patient's care up to this point  Labs: ordered  Details: Hemoglobin level within normal limits  Remaining laboratory evaluation grossly unremarkable  Radiology: ordered  Details: Ultrasonography of the uterus was notable for the presence of retained products of conception  Risk  Prescription drug management  Disposition  Final diagnoses:   Vaginal bleeding   Retained products of conception after miscarriage     Time reflects when diagnosis was documented in both MDM as applicable and the Disposition within this note     Time User Action Codes Description Comment    4/30/2023  1:55 PM Ulysses Everett Add [N93 9] Vaginal bleeding     4/30/2023  1:56 PM Ulysses Everett Add [O03 4] Retained products of conception after miscarriage       ED Disposition     ED Disposition   Discharge    Condition   Stable    Date/Time   Sun Apr 30, 2023  1:39 PM    [address] discharge to home/self care                 Follow-up Information     Follow up With Specialties Details Why Contact Info Additional Information    Kavitha Mi PA-C Physician Assistant Schedule an appointment as soon as possible for a visit   [address] [place] [address] Emergency Department Emergency Medicine  As needed, If symptoms worsen [facility] [phone]  [facility] Emergency Department, [address], [address] [place] [address] Obgyn [facility] and Gynecology Call  If persistence of bleeding [phone]  [facility], [facility] [address], [address] [facility], [id number] [phone]          Patient's Medications   Discharge Prescriptions    No medications on file     No discharge procedures on file  PDMP Review     None           ED Provider  Attending physically available and evaluated Yamil Lara MCGILL managed the patient along with the ED Attending      Electronically Signed by         Edd Read MD  04/30/23 4190

## 2023-05-01 ENCOUNTER — OFFICE VISIT (OUTPATIENT)
Dept: OBGYN CLINIC | Facility: MEDICAL CENTER | Age: 28
End: 2023-05-01

## 2023-05-01 VITALS
BODY MASS INDEX: 47.93 KG/M2 | DIASTOLIC BLOOD PRESSURE: 70 MMHG | WEIGHT: 270.5 LBS | HEIGHT: 63 IN | SYSTOLIC BLOOD PRESSURE: 124 MMHG

## 2023-05-01 DIAGNOSIS — O02.1 MISSED AB: Primary | ICD-10-CM

## 2023-05-01 NOTE — PROGRESS NOTES
A/p    1  Incomplete AB   4/14- missed ab given Cytotec   Starting bleeding - and very heavy with passage clots      Went to the ED    Still has a collapsed sac - given Cytotec again      Sonogram repeat today appears to be empty with no clot any longer (see below)     Will get another scan end of next week to confirm the blood and clots are empty         Pt advised precaution

## 2023-05-12 ENCOUNTER — HOSPITAL ENCOUNTER (OUTPATIENT)
Dept: ULTRASOUND IMAGING | Facility: HOSPITAL | Age: 28
Discharge: HOME | End: 2023-05-12
Attending: OBSTETRICS & GYNECOLOGY

## 2023-05-12 DIAGNOSIS — O02.1 MISSED AB: ICD-10-CM

## 2023-05-16 ENCOUNTER — TELEPHONE (OUTPATIENT)
Dept: FAMILY MEDICINE CLINIC | Facility: CLINIC | Age: 28
End: 2023-05-16

## 2023-05-16 DIAGNOSIS — E55.9 VITAMIN D DEFICIENCY: ICD-10-CM

## 2023-05-16 RX ORDER — ERGOCALCIFEROL 1.25 MG/1
50000 CAPSULE ORAL WEEKLY
Qty: 12 CAPSULE | Refills: 0 | Status: SHIPPED | OUTPATIENT
Start: 2023-05-16 | End: 2023-05-24 | Stop reason: SDUPTHER

## 2023-05-24 DIAGNOSIS — E55.9 VITAMIN D DEFICIENCY: ICD-10-CM

## 2023-05-24 DIAGNOSIS — F32.A ANXIETY AND DEPRESSION: ICD-10-CM

## 2023-05-24 DIAGNOSIS — F41.9 ANXIETY AND DEPRESSION: ICD-10-CM

## 2023-05-24 DIAGNOSIS — E53.8 VITAMIN B12 DEFICIENCY: Primary | ICD-10-CM

## 2023-05-24 RX ORDER — SERTRALINE HYDROCHLORIDE 100 MG/1
100 TABLET, FILM COATED ORAL DAILY
Qty: 90 TABLET | Refills: 0 | Status: SHIPPED | OUTPATIENT
Start: 2023-05-24

## 2023-05-24 RX ORDER — SERTRALINE HYDROCHLORIDE 25 MG/1
25 TABLET, FILM COATED ORAL DAILY
Qty: 90 TABLET | Refills: 0 | Status: SHIPPED | OUTPATIENT
Start: 2023-05-24

## 2023-05-24 RX ORDER — ERGOCALCIFEROL 1.25 MG/1
50000 CAPSULE ORAL WEEKLY
Qty: 12 CAPSULE | Refills: 0 | Status: SHIPPED | OUTPATIENT
Start: 2023-05-24

## 2023-06-05 ENCOUNTER — OFFICE VISIT (OUTPATIENT)
Dept: BARIATRICS | Facility: CLINIC | Age: 28
End: 2023-06-05
Payer: COMMERCIAL

## 2023-06-05 VITALS
OXYGEN SATURATION: 97 % | SYSTOLIC BLOOD PRESSURE: 117 MMHG | HEIGHT: 63 IN | WEIGHT: 271 LBS | DIASTOLIC BLOOD PRESSURE: 72 MMHG | TEMPERATURE: 98.4 F | BODY MASS INDEX: 48.02 KG/M2 | RESPIRATION RATE: 103 BRPM

## 2023-06-05 DIAGNOSIS — R73.9 HYPERGLYCEMIA: ICD-10-CM

## 2023-06-05 DIAGNOSIS — E66.01 CLASS 3 OBESITY (HCC): Primary | ICD-10-CM

## 2023-06-05 PROBLEM — E66.813 CLASS 3 OBESITY: Status: ACTIVE | Noted: 2023-06-05

## 2023-06-05 PROCEDURE — 99203 OFFICE O/P NEW LOW 30 MIN: CPT | Performed by: PHYSICIAN ASSISTANT

## 2023-06-05 NOTE — PATIENT INSTRUCTIONS
Goals: Food log (ie ) www myfitnesspal com,sparkpeople  com,loseit com,calorieking  com,etc  baritastic  No sugary beverages  At least 64oz of water daily  Increase physical activity by 10 minutes daily   Gradually increase physical activity to a goal of 5 days per week for 30 minutes of MODERATE intensity PLUS 2 days per week of FULL BODY resistance training  5-10 servings of fruits and vegetables per day and 25-35 grams of dietary fiber per day, gradually increasing  Try exercise videos  3203-6305 calories per day   Recommend checking lab coverage before having labs drawn   If choosing starch pick whole grain/complex carbs and keep to 1/2-3/4 cup: oatmeal, brown rice, quinoa, whole wheat pasta, potatoes, sweet potato, chickpea noodles, red lentil noodles  Add fruit to breakfast  Add vegetable to lunch  Double portion of vegetable at night

## 2023-06-05 NOTE — LETTER
June 5, 2023     STEVE Harrell [id number]    Patient: Tato Anderson   YOB: 1995   Date of Visit: 6/5/2023       Dear Dr Suzi Segundo: Thank you for referring Luis Ortiz to me for evaluation  Below are my notes for this consultation  If you have questions, please do not hesitate to call me  I look forward to following your patient along with you  Sincerely,        Yovany Greene PA-C        CC: No Recipients    Thierry De Los Santos  6/5/2023  1:17 PM  Incomplete  Assessment/Plan:    Class 3 obesity ([address] )  -Discussed options of HealthyCORE-Intensive Lifestyle Intervention Program, Very Low Calorie Diet-VLCD, Conservative Program, Esteban-En-Y Gastric Bypass and Vertical Sleeve Gastrectomy and the role of weight loss medications   -Initial weight loss goal of 5-10% weight loss for improved health  -Not currently interested in bariatric surgery  -TTC-AVOID AOM  -Screening labs: LP and TSH reviewed from 2/25/23, CMP reviewed from 4/30/23  -Patient is interested in pursuing Conservative Program   -Calorie goals, sample menu, portion size guidelines, and food logging reviewed with the patient  Follow up in approximately 3 months with Non-Surgical Physician/Advanced Practitioner  Goals:  Food log (ie ) www myfitnesspal com,sparkpeople  com,loseit com,calorieking  com,etc  baritastic  No sugary beverages  At least 64oz of water daily  Increase physical activity by 10 minutes daily   Gradually increase physical activity to a goal of 5 days per week for 30 minutes of MODERATE intensity PLUS 2 days per week of FULL BODY resistance training  5-10 servings of fruits and vegetables per day and 25-35 grams of dietary fiber per day, gradually increasing  Try exercise videos  6909-7769 calories per day   Recommend checking lab coverage before having labs drawn   If choosing starch pick whole grain/complex carbs and keep to 1/2-3/4 cup: oatmeal, brown rice, quinoa, whole wheat pasta, potatoes, sweet potato, chickpea noodles, red lentil noodles  Add fruit to breakfast  Add vegetable to lunch  Double portion of vegetable at night      Diagnoses and all orders for this visit:    Class 3 obesity ([address] )  -     Hemoglobin A1C; Future  -     Insulin, fasting; Future    Hyperglycemia  -     Hemoglobin A1C; Future  -     Insulin, fasting; Future    Body mass index (BMI) of 45 0-49 9 in adult [facility])        Subjective:   Chief Complaint   Patient presents with   • Consult     Patient ID: Carli Ansari  is a 24 y o  female with excess weight/obesity here to pursue weight management  Past Medical History:   Diagnosis Date   • Anxiety    • Asthma     as a child   • Depression    • Diet controlled gestational diabetes mellitus (GDM) in third trimester 3/3/2021   • Miscarriage 4/14/2023   • Urinary tract infection      HPI:  Obesity/Excess Weight:  Severity: Severe  Onset:  After starting DEPO as a teenager    Modifiers: Diet and Exercise  Contributing factors: Pregnancy and Medications  Associated symptoms: fatigue and body image issues    Goals: 200  Hydration: 3 or less bottles of water  Alcohol: denies  Exercise: no  Occupation: recently is staying home with daughter    B: 2 eggs + 2 sausage + 2 toast  S: no  L: sandwich + chips  S: no  D: protein + veg + starch  S: sometimes cereal  Dines out: since having daughter more frequently, estimates 2-3x/week     STOPBANG: 3/8    The following portions of the patient's history were reviewed and updated as appropriate: allergies, current medications, past family history, past medical history, past social history, past surgical history and problem list     Review of Systems   Constitutional: Negative for chills and fever  HENT: Negative for sore throat  Respiratory: Negative for cough and shortness of breath  Cardiovascular: Negative for chest pain and palpitations  Gastrointestinal: Negative for constipation and diarrhea  "  Genitourinary: Negative for dysuria  Musculoskeletal: Negative for arthralgias  Skin: Negative for rash  Neurological: Negative for headaches  Psychiatric/Behavioral: Negative  Objective:    /72   Temp 98 4 °F (36 9 °C)   Resp (!) 103   Ht 5' 3\" (1 6 m)   Wt 123 kg (271 lb)   LMP 02/05/2023   SpO2 97%   BMI 48 01 kg/m²     Physical Exam  Vitals and nursing note reviewed  Constitutional   General appearance: Abnormal   well developed and obese  Eyes No conjunctival pallor  Ears, Nose, Mouth, and Throat Oral mucosa moist    Pulmonary   Respiratory effort: No increased work of breathing or signs of respiratory distress  Auscultation of lungs: Clear to auscultation, equal breath sounds bilaterally, no wheezes, no rales, no rhonci  Cardiovascular   Auscultation of heart: Normal rate and rhythm, normal S1 and S2, without murmurs  Examination of extremities for edema and/or varicosities: Normal   no edema  Abdomen   Abdomen: Abnormal   The abdomen was obese  Bowel sounds were normal  The abdomen was soft and nontender  Musculoskeletal   Gait and station: Normal     Psychiatric   Orientation to person, place and time: Normal     Affect: appropriate    Lilia Gonzalez PA-C  6/5/2023 11:58 AM  Incomplete  -Discussed options of HealthyCORE-Intensive Lifestyle Intervention Program, Very Low Calorie Diet-VLCD, Conservative Program, Esteban-En-Y Gastric Bypass and Vertical Sleeve Gastrectomy and the role of weight loss medications   -Initial weight loss goal of 5-10% weight loss for improved health  -Not currently interested in bariatric surgery  -TTC-AVOID AOM  -Screening labs  -Patient is interested in pursuing Conservative Program    Assessment/Plan:    No problem-specific Assessment & Plan notes found for this encounter  Follow up in approximately {FOLLOW UP:24371} with {WM Follow Up:97068}      Goals:  Food log (ie ) " www myfitnesspal com,sparkpeople  com,loseit com,calorieking  com,etc  baritastic  No sugary beverages  At least 64oz of water daily  Increase physical activity by 10 minutes daily  Gradually increase physical activity to a goal of 5 days per week for 30 minutes of MODERATE intensity PLUS 2 days per week of FULL BODY resistance training  5-10 servings of fruits and vegetables per day and 25-35 grams of dietary fiber per day, gradually increasing  Try exercise videos  2356-7380 calories per day   Recommend checking lab coverage before having labs drawn   If choosing starch pick whole grain/complex carbs and keep to 1/2-3/4 cup: oatmeal, brown rice, quinoa, whole wheat pasta, potatoes, sweet potato, chickpea noodles, red lentil noodles  Add fruit to breakfast  Add vegetable to lunch  Double portion of vegetable at night      Diagnoses and all orders for this visit:    Class 3 obesity (HCC)  -     Hemoglobin A1C; Future  -     Insulin, fasting; Future    Hyperglycemia  -     Hemoglobin A1C; Future  -     Insulin, fasting; Future        Subjective:   No chief complaint on file  Patient ID: Jael Read  is a 24 y o  female with excess weight/obesity here to pursue weight management    Past Medical History:   Diagnosis Date   • Anxiety    • Asthma     as a child   • Depression    • Diet controlled gestational diabetes mellitus (GDM) in third trimester 3/3/2021   • Miscarriage 4/14/2023   • Urinary tract infection      HPI:  Obesity/Excess Weight:  Severity: Severe  Onset:  After starting DEPO as a teenager    Modifiers: Diet and Exercise  Contributing factors: Pregnancy and Medications  Associated symptoms: fatigue and body image issues    Goals: 200  Hydration: 3 or less bottles of water  Alcohol: denies  Exercise: no  Occupation: recently is staying home with daughter    B: 2 eggs + 2 sausage + 2 toast  S: no  L: sandwich + chips  S: no  D: protein + veg + starch  S: sometimes cereal  Dines out: since having "daughter more frequently, estimates 2-3x/week     STOPBANG: ***    {Common ambulatory SmartLinks:21396}    Review of Systems   Constitutional: Negative for chills and fever  HENT: Negative for sore throat  Respiratory: Negative for cough and shortness of breath  Cardiovascular: Negative for chest pain and palpitations  Gastrointestinal: Negative for constipation and diarrhea  Genitourinary: Negative for dysuria  Musculoskeletal: Negative for arthralgias  Skin: Negative for rash  Neurological: Negative for headaches  Psychiatric/Behavioral: Negative  Objective:    /72   Temp 98 4 °F (36 9 °C)   Resp (!) 103   Ht 5' 3\" (1 6 m)   Wt 123 kg (271 lb)   LMP 02/05/2023   SpO2 97%   BMI 48 01 kg/m²     Physical Exam  Vitals and nursing note reviewed         "

## 2023-06-05 NOTE — PROGRESS NOTES
Assessment/Plan:    Class 3 obesity (HCC)  -Discussed options of HealthyCORE-Intensive Lifestyle Intervention Program, Very Low Calorie Diet-VLCD, Conservative Program, Esteban-En-Y Gastric Bypass and Vertical Sleeve Gastrectomy and the role of weight loss medications   -Initial weight loss goal of 5-10% weight loss for improved health  -Not currently interested in bariatric surgery  -TTC-AVOID AOM  -Screening labs: LP and TSH reviewed from 2/25/23, CMP reviewed from 4/30/23  -Patient is interested in pursuing Conservative Program   -Calorie goals, sample menu, portion size guidelines, and food logging reviewed with the patient  Follow up in approximately 3 months with Non-Surgical Physician/Advanced Practitioner  Goals:  Food log (ie ) www myfitnesspal com,sparkpeople  com,loseit com,calorieking  com,etc  baritastic  No sugary beverages  At least 64oz of water daily  Increase physical activity by 10 minutes daily  Gradually increase physical activity to a goal of 5 days per week for 30 minutes of MODERATE intensity PLUS 2 days per week of FULL BODY resistance training  5-10 servings of fruits and vegetables per day and 25-35 grams of dietary fiber per day, gradually increasing  Try exercise videos  1359-5388 calories per day   Recommend checking lab coverage before having labs drawn   If choosing starch pick whole grain/complex carbs and keep to 1/2-3/4 cup: oatmeal, brown rice, quinoa, whole wheat pasta, potatoes, sweet potato, chickpea noodles, red lentil noodles  Add fruit to breakfast  Add vegetable to lunch  Double portion of vegetable at night      Diagnoses and all orders for this visit:    Class 3 obesity (HCC)  -     Hemoglobin A1C; Future  -     Insulin, fasting; Future    Hyperglycemia  -     Hemoglobin A1C; Future  -     Insulin, fasting;  Future    Body mass index (BMI) of 45 0-49 9 in adult [facility])        Subjective:   Chief Complaint   Patient presents with   • Consult     Patient ID: Shlomo Smith "Moy Cho  is a 24 y o  female with excess weight/obesity here to pursue weight management  Past Medical History:   Diagnosis Date   • Anxiety    • Asthma     as a child   • Depression    • Diet controlled gestational diabetes mellitus (GDM) in third trimester 3/3/2021   • Miscarriage 4/14/2023   • Urinary tract infection      HPI:  Obesity/Excess Weight:  Severity: Severe  Onset:  After starting DEPO as a teenager    Modifiers: Diet and Exercise  Contributing factors: Pregnancy and Medications  Associated symptoms: fatigue and body image issues    Goals: 200  Hydration: 3 or less bottles of water  Alcohol: denies  Exercise: no  Occupation: recently is staying home with daughter    B: 2 eggs + 2 sausage + 2 toast  S: no  L: sandwich + chips  S: no  D: protein + veg + starch  S: sometimes cereal  Dines out: since having daughter more frequently, estimates 2-3x/week     STOPBANG: 3/8    The following portions of the patient's history were reviewed and updated as appropriate: allergies, current medications, past family history, past medical history, past social history, past surgical history and problem list     Review of Systems   Constitutional: Negative for chills and fever  HENT: Negative for sore throat  Respiratory: Negative for cough and shortness of breath  Cardiovascular: Negative for chest pain and palpitations  Gastrointestinal: Negative for constipation and diarrhea  Genitourinary: Negative for dysuria  Musculoskeletal: Negative for arthralgias  Skin: Negative for rash  Neurological: Negative for headaches  Psychiatric/Behavioral: Negative  Objective:    /72   Temp 98 4 °F (36 9 °C)   Resp (!) 103   Ht 5' 3\" (1 6 m)   Wt 123 kg (271 lb)   LMP 02/05/2023   SpO2 97%   BMI 48 01 kg/m²     Physical Exam  Vitals and nursing note reviewed  Constitutional   General appearance: Abnormal   well developed and obese  Eyes No conjunctival pallor     Ears, Nose, Mouth, and Throat " Oral mucosa moist    Pulmonary   Respiratory effort: No increased work of breathing or signs of respiratory distress  Auscultation of lungs: Clear to auscultation, equal breath sounds bilaterally, no wheezes, no rales, no rhonci  Cardiovascular   Auscultation of heart: Normal rate and rhythm, normal S1 and S2, without murmurs  Examination of extremities for edema and/or varicosities: Normal   no edema  Abdomen   Abdomen: Abnormal   The abdomen was obese  Bowel sounds were normal  The abdomen was soft and nontender     Musculoskeletal   Gait and station: Normal     Psychiatric   Orientation to person, place and time: Normal     Affect: appropriate

## 2023-06-05 NOTE — ASSESSMENT & PLAN NOTE
-Discussed options of HealthyCORE-Intensive Lifestyle Intervention Program, Very Low Calorie Diet-VLCD, Conservative Program, Esteban-En-Y Gastric Bypass and Vertical Sleeve Gastrectomy and the role of weight loss medications   -Initial weight loss goal of 5-10% weight loss for improved health  -Not currently interested in bariatric surgery  -TTC-AVOID AOM  -Screening labs: LP and TSH reviewed from 2/25/23, CMP reviewed from 4/30/23  -Patient is interested in pursuing Conservative Program   -Calorie goals, sample menu, portion size guidelines, and food logging reviewed with the patient

## 2023-06-16 ENCOUNTER — APPOINTMENT (OUTPATIENT)
Dept: LAB | Facility: MEDICAL CENTER | Age: 28
End: 2023-06-16
Payer: COMMERCIAL

## 2023-06-16 DIAGNOSIS — R73.9 HYPERGLYCEMIA: ICD-10-CM

## 2023-06-16 DIAGNOSIS — E53.8 VITAMIN B12 DEFICIENCY: ICD-10-CM

## 2023-06-16 DIAGNOSIS — E66.01 CLASS 3 OBESITY (HCC): ICD-10-CM

## 2023-06-16 DIAGNOSIS — E55.9 VITAMIN D DEFICIENCY: ICD-10-CM

## 2023-06-16 LAB
25(OH)D3 SERPL-MCNC: 32.2 NG/ML (ref 30–100)
INSULIN SERPL-ACNC: 27.66 UIU/ML (ref 1.9–23)
VIT B12 SERPL-MCNC: 355 PG/ML (ref 180–914)

## 2023-06-16 PROCEDURE — 36415 COLL VENOUS BLD VENIPUNCTURE: CPT

## 2023-06-16 PROCEDURE — 83036 HEMOGLOBIN GLYCOSYLATED A1C: CPT

## 2023-06-16 PROCEDURE — 82306 VITAMIN D 25 HYDROXY: CPT

## 2023-06-16 PROCEDURE — 83525 ASSAY OF INSULIN: CPT

## 2023-06-16 PROCEDURE — 82607 VITAMIN B-12: CPT

## 2023-06-17 LAB
EST. AVERAGE GLUCOSE BLD GHB EST-MCNC: 117 MG/DL
HBA1C MFR BLD: 5.7 %

## 2023-06-26 ENCOUNTER — OFFICE VISIT (OUTPATIENT)
Dept: BARIATRICS | Facility: CLINIC | Age: 28
End: 2023-06-26

## 2023-06-26 ENCOUNTER — CLINICAL SUPPORT (OUTPATIENT)
Dept: FAMILY MEDICINE CLINIC | Facility: CLINIC | Age: 28
End: 2023-06-26
Payer: COMMERCIAL

## 2023-06-26 VITALS — BODY MASS INDEX: 47.66 KG/M2 | WEIGHT: 269 LBS | HEIGHT: 63 IN

## 2023-06-26 DIAGNOSIS — R63.5 ABNORMAL WEIGHT GAIN: ICD-10-CM

## 2023-06-26 DIAGNOSIS — E53.8 VITAMIN B12 DEFICIENCY: Primary | ICD-10-CM

## 2023-06-26 PROCEDURE — WMDI30

## 2023-06-26 PROCEDURE — 96372 THER/PROPH/DIAG INJ SC/IM: CPT | Performed by: PHYSICIAN ASSISTANT

## 2023-06-26 PROCEDURE — RECHECK

## 2023-06-26 RX ADMIN — CYANOCOBALAMIN 1000 MCG: 1000 INJECTION, SOLUTION INTRAMUSCULAR; SUBCUTANEOUS at 12:45

## 2023-06-26 NOTE — PROGRESS NOTES
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, having previously worked the past 4 5 years in a sedentary jobs as a   Feels her portions may be too large and endorsed eating until she is full/overly full  Not currently food-logging as she feels she does not have the time, but had used Fitbit agapito in the past  Stated she does not snack much and denied overeating ice cream of other sweets (reported only having one spoonful is she is craving an item)  She is looking to change her lifestyle rather than diet  Trying to eat more vegetables since meeting with [facility] provider  Provided Paz with calorie-controlled, balanced meal plan  Topics discussed include mindfulness, CHO counting, and label-reading, and the importance of moving  No RD follow-up scheduled at this time           Patient seen by Medical Provider in past 6 months:  yes  Requested to schedule appointment with Medical Provider: No      Anthropometric Measurements  Start Weight (#): 271#  Current Weight (#): 269#  TBW % Change from start weight: 0 7%  Ideal Body Weight (#): 115# (52 3 kg)  Goal Weight (#): 200#  Highest:271#  Lowest: 240#    Weight Loss History  Previous weight loss attempts: working out at gym, eating better; thinks being consistent is difficult    Food and Nutrition Related History  Wake up: 7 AM  Bed Time: 10 PM    Food Recall  Breakfast: water; 9 AM-  1 cup of OJ, 2 eggs, 1 cup of fruit and 1 piece of white toast, butter ~1 tsp   Snack: none  Lunch: 12-1 PM- leftovers or tuna sandwich, hard-boiled egg, 1/2 cucumber, veggie dip store-bought (~2 tbsp)  Snack:none  Dinner:6 PM- meat, vegetable, starch (potatoes, fries, rice)  Snack:sometimes has a bowl of cereal - Rice Krispies or Frosted Flakes with fruit, Chocolate Crave; she estimates she has 2 cups; 2%- 8 oz      Beverages: OJ sometimes, water- ~64 oz since meeting with Susan, but had previously been consuming 3 water bottles daily  Volume of beverage intake: >=64 oz    Weekends: same  Cravings: sweetness of cereal at night, but usually craves salty foods such as chips  Trouble area of day: evening    Frequency of Eating out: 2 times weekly   Food restrictions: none  Cooking: self   Food Shopping: self    Physical Activity Intake  Activity:just bought gym membership; active throughout the day, but no formal activity; walks 30-45 minute walk 1-2x weekly  Frequency:1-2x/week  Physical limitations/barriers to exercise: hx injury tail-bone ~1 5 years ago, still bothers her; rash between thighs when works out; muscle spasms which can be painful    Estimated Needs  Energy  SECA: BMR: n/a     X 1 3 -1000 =  [place] Energy Needs: BMR: 1934   1-2# loss weekly sedentary: 2572-9893             1-2# loss weekly lightly active: 5363-2650  Maintenance calories for sedentary activity level: 2321  Protein: 63-78 g      (1 2-1 5g/kg IBW)  Fluid: >=61 oz   (35mL/kg IBW)    Nutrition Diagnosis  Yes; Overweight/obesity  related to Excess energy intake as evidenced by  BMI more than normative standard for age and sex (obesity-grade III 36+)       Nutrition Intervention    Nutrition Prescription  Calories: 5707-3822  Protein:~65-80 g  Fluid: >=64 oz    Meal Plan (Donny/Pro/Carb)  Provided Paz with sample 0435-0890 kcal meal plan      Nutrition Education:    Healthy Core Manual- n/a  Calorie controlled menu  Lean protein food choices  Healthy snack options  Food journaling tips      Nutrition Counseling:  Strategies: meal planning, portion sizes, healthy snack choices, hydration, fiber intake, protein intake, exercise, food journal      Monitoring and Evaluation:  Evaluation criteria:  Energy Intake  Meet protein needs  Maintain adequate hydration  Monitor weekly weight  Meal planning/preparation  Food journal   Decreased portions at mealtimes and snacks  Physical activity     Barriers to learning:none  Readiness to change: Action:  (Changing behavior)  Comprehension: very good  Expected Compliance: very good

## 2023-07-05 DIAGNOSIS — F41.9 ANXIETY AND DEPRESSION: ICD-10-CM

## 2023-07-05 DIAGNOSIS — F32.A ANXIETY AND DEPRESSION: ICD-10-CM

## 2023-07-05 DIAGNOSIS — E55.9 VITAMIN D DEFICIENCY: ICD-10-CM

## 2023-07-05 RX ORDER — SERTRALINE HYDROCHLORIDE 25 MG/1
25 TABLET, FILM COATED ORAL DAILY
Qty: 90 TABLET | Refills: 0 | Status: SHIPPED | OUTPATIENT
Start: 2023-07-05

## 2023-07-05 RX ORDER — SERTRALINE HYDROCHLORIDE 100 MG/1
100 TABLET, FILM COATED ORAL DAILY
Qty: 90 TABLET | Refills: 0 | Status: SHIPPED | OUTPATIENT
Start: 2023-07-05

## 2023-07-05 RX ORDER — ERGOCALCIFEROL 1.25 MG/1
50000 CAPSULE ORAL WEEKLY
Qty: 12 CAPSULE | Refills: 0 | Status: SHIPPED | OUTPATIENT
Start: 2023-07-05

## 2023-07-05 NOTE — TELEPHONE ENCOUNTER
As she is currently pregnant, she needs to contact OBGYN.        ----- Message from Zay Abdalla sent at 7/5/2023 11:41 AM EDT -----  Regarding: FW: Yeast infection   Contact: [phone]    ----- Message -----  From: Rick Magallon  Sent: 7/5/2023  11:35 AM EDT  To: [facility]  Subject: Yeast infection                                  I wasn’t sure if I should be messaging you or my Gyno, but would I be able to get something for a yeast infection?  Thank you

## 2023-07-10 ENCOUNTER — TELEPHONE (OUTPATIENT)
Dept: FAMILY MEDICINE CLINIC | Facility: CLINIC | Age: 28
End: 2023-07-10

## 2023-07-10 DIAGNOSIS — Z32.00 ENCOUNTER FOR PREGNANCY TEST, RESULT UNKNOWN: Primary | ICD-10-CM

## 2023-07-11 ENCOUNTER — CLINICAL SUPPORT (OUTPATIENT)
Dept: FAMILY MEDICINE CLINIC | Facility: CLINIC | Age: 28
End: 2023-07-11
Payer: COMMERCIAL

## 2023-07-11 ENCOUNTER — APPOINTMENT (OUTPATIENT)
Dept: LAB | Facility: MEDICAL CENTER | Age: 28
End: 2023-07-11
Payer: COMMERCIAL

## 2023-07-11 DIAGNOSIS — E53.8 VITAMIN B12 DEFICIENCY: Primary | ICD-10-CM

## 2023-07-11 DIAGNOSIS — Z32.00 ENCOUNTER FOR PREGNANCY TEST, RESULT UNKNOWN: ICD-10-CM

## 2023-07-11 LAB — B-HCG SERPL-ACNC: 15 MIU/ML (ref 0–5)

## 2023-07-11 PROCEDURE — 84702 CHORIONIC GONADOTROPIN TEST: CPT

## 2023-07-11 PROCEDURE — 96372 THER/PROPH/DIAG INJ SC/IM: CPT | Performed by: PHYSICIAN ASSISTANT

## 2023-07-11 PROCEDURE — 36415 COLL VENOUS BLD VENIPUNCTURE: CPT

## 2023-07-11 RX ADMIN — CYANOCOBALAMIN 1000 MCG: 1000 INJECTION, SOLUTION INTRAMUSCULAR; SUBCUTANEOUS at 11:41

## 2023-07-12 ENCOUNTER — TELEPHONE (OUTPATIENT)
Dept: OBGYN CLINIC | Facility: MEDICAL CENTER | Age: 28
End: 2023-07-12

## 2023-07-14 ENCOUNTER — TELEPHONE (OUTPATIENT)
Dept: OBGYN CLINIC | Facility: MEDICAL CENTER | Age: 28
End: 2023-07-14

## 2023-07-14 DIAGNOSIS — N92.6 MISSED MENSES: ICD-10-CM

## 2023-07-14 DIAGNOSIS — Z32.01 POSITIVE BLOOD PREGNANCY TEST: Primary | ICD-10-CM

## 2023-07-14 NOTE — TELEPHONE ENCOUNTER
Patient called into office in regards to possible pregnancy. Patient lab work done with PCP shows HCG levels at a 15. Pt with c/o cramping and bleeding that is a tad heavier than spotting. Patient advised to repeat HCG labs to track levels. Patient advised of bleeding precautions and to call with any further questions or concerns. Please add repeat labs, thank you!  Patient LMP 6/6/23

## 2023-07-14 NOTE — TELEPHONE ENCOUNTER
Spoke with patient regarding blood work she recently got done. Patient did get a positive pregnancy test and LMP 6/6/23. Patient states she has been experiencing some vaginal bleeding and cramping since last night. Patient states having recent intercourse. Discussed with patient recent intercourse can cause bleeding. Advised patient should repeat blood work early next week in order for us to see the trend. Advised to monitor her bleeding at home. Bleeding precautions reviewed. Order for repeat hcg placed. Patient to call back with any questions or concerns.

## 2023-07-17 ENCOUNTER — APPOINTMENT (OUTPATIENT)
Dept: LAB | Facility: MEDICAL CENTER | Age: 28
End: 2023-07-17
Payer: COMMERCIAL

## 2023-07-17 DIAGNOSIS — Z32.01 POSITIVE BLOOD PREGNANCY TEST: ICD-10-CM

## 2023-07-17 LAB — B-HCG SERPL-ACNC: 52 MIU/ML (ref 0–5)

## 2023-07-17 PROCEDURE — 36415 COLL VENOUS BLD VENIPUNCTURE: CPT

## 2023-07-17 PROCEDURE — 84702 CHORIONIC GONADOTROPIN TEST: CPT

## 2023-07-18 DIAGNOSIS — O02.81 INAPPROPRIATE CHANGE IN QUANTITATIVE HCG IN EARLY PREGNANCY: Primary | ICD-10-CM

## 2023-07-18 DIAGNOSIS — O03.9 MISCARRIAGE: ICD-10-CM

## 2023-07-19 ENCOUNTER — APPOINTMENT (OUTPATIENT)
Dept: LAB | Facility: MEDICAL CENTER | Age: 28
End: 2023-07-19
Payer: COMMERCIAL

## 2023-07-19 LAB — B-HCG SERPL-ACNC: 130 MIU/ML (ref 0–5)

## 2023-07-19 PROCEDURE — 84702 CHORIONIC GONADOTROPIN TEST: CPT | Performed by: STUDENT IN AN ORGANIZED HEALTH CARE EDUCATION/TRAINING PROGRAM

## 2023-07-19 PROCEDURE — 36415 COLL VENOUS BLD VENIPUNCTURE: CPT | Performed by: STUDENT IN AN ORGANIZED HEALTH CARE EDUCATION/TRAINING PROGRAM

## 2023-07-20 ENCOUNTER — TELEPHONE (OUTPATIENT)
Dept: OBGYN CLINIC | Facility: MEDICAL CENTER | Age: 28
End: 2023-07-20

## 2023-07-20 NOTE — TELEPHONE ENCOUNTER
Patient called about her test results,she is a little concerned about. Patient is concerned about the numbers of her hcg no bleeding currently and no cramping. Please review when you get a chance.  Thank you

## 2023-07-24 ENCOUNTER — CLINICAL SUPPORT (OUTPATIENT)
Dept: FAMILY MEDICINE CLINIC | Facility: CLINIC | Age: 28
End: 2023-07-24
Payer: COMMERCIAL

## 2023-07-24 ENCOUNTER — APPOINTMENT (OUTPATIENT)
Dept: LAB | Facility: MEDICAL CENTER | Age: 28
End: 2023-07-24
Payer: COMMERCIAL

## 2023-07-24 DIAGNOSIS — E53.8 VITAMIN B12 DEFICIENCY: Primary | ICD-10-CM

## 2023-07-24 RX ADMIN — CYANOCOBALAMIN 1000 MCG: 1000 INJECTION, SOLUTION INTRAMUSCULAR; SUBCUTANEOUS at 11:59

## 2023-07-25 ENCOUNTER — TELEPHONE (OUTPATIENT)
Dept: OBGYN CLINIC | Facility: CLINIC | Age: 28
End: 2023-07-25

## 2023-07-25 DIAGNOSIS — Z32.01 POSITIVE BLOOD PREGNANCY TEST: Primary | ICD-10-CM

## 2023-07-25 NOTE — TELEPHONE ENCOUNTER
Patient aware of results and recommendations. Will get lab work tomorrow. Order for [address] placed to get completed next week.

## 2023-07-26 ENCOUNTER — APPOINTMENT (OUTPATIENT)
Dept: LAB | Facility: MEDICAL CENTER | Age: 28
End: 2023-07-26
Payer: COMMERCIAL

## 2023-07-27 ENCOUNTER — TELEPHONE (OUTPATIENT)
Dept: OBGYN CLINIC | Facility: MEDICAL CENTER | Age: 28
End: 2023-07-27

## 2023-07-28 ENCOUNTER — HOSPITAL ENCOUNTER (OUTPATIENT)
Dept: ULTRASOUND IMAGING | Facility: HOSPITAL | Age: 28
Discharge: HOME | End: 2023-07-28
Attending: STUDENT IN AN ORGANIZED HEALTH CARE EDUCATION/TRAINING PROGRAM
Payer: COMMERCIAL

## 2023-07-28 DIAGNOSIS — Z32.01 POSITIVE BLOOD PREGNANCY TEST: ICD-10-CM

## 2023-07-28 PROCEDURE — 76815 OB US LIMITED FETUS(S): CPT

## 2023-08-01 ENCOUNTER — TELEPHONE (OUTPATIENT)
Dept: OBGYN CLINIC | Facility: MEDICAL CENTER | Age: 28
End: 2023-08-01

## 2023-08-01 NOTE — TELEPHONE ENCOUNTER
Pt called inquiring about results for US done at [facility]. Inform patient we have not received results just yet. Once results are received and reviewed we will give her a call with results.

## 2023-08-02 ENCOUNTER — TELEPHONE (OUTPATIENT)
Dept: OBGYN CLINIC | Facility: MEDICAL CENTER | Age: 28
End: 2023-08-02

## 2023-08-02 NOTE — TELEPHONE ENCOUNTER
Radiology department called in regards to reporting significant findings on patient's US. Please review, thank you!

## 2023-08-08 ENCOUNTER — ULTRASOUND (OUTPATIENT)
Dept: OBGYN CLINIC | Facility: CLINIC | Age: 28
End: 2023-08-08
Payer: COMMERCIAL

## 2023-08-08 VITALS
HEIGHT: 63 IN | DIASTOLIC BLOOD PRESSURE: 70 MMHG | BODY MASS INDEX: 47.66 KG/M2 | WEIGHT: 269 LBS | SYSTOLIC BLOOD PRESSURE: 124 MMHG

## 2023-08-08 DIAGNOSIS — N96 RECURRENT PREGNANCY LOSS: ICD-10-CM

## 2023-08-08 DIAGNOSIS — O02.1 MISSED AB: ICD-10-CM

## 2023-08-08 DIAGNOSIS — O03.9 MISCARRIAGE: Primary | ICD-10-CM

## 2023-08-08 PROCEDURE — 99214 OFFICE O/P EST MOD 30 MIN: CPT | Performed by: STUDENT IN AN ORGANIZED HEALTH CARE EDUCATION/TRAINING PROGRAM

## 2023-08-08 RX ORDER — MISOPROSTOL 200 UG/1
TABLET ORAL
Qty: 4 TABLET | Refills: 0 | Status: SHIPPED | OUTPATIENT
Start: 2023-08-08

## 2023-08-08 NOTE — PROGRESS NOTES
[facility] [address] [place] [address]    Assessment/Plan:  Benny Hopper is a 24 y.o. [id number] who presents with early pregnancy loss. Miscarriage  - Today we discussed the ultrasound findings of persistent empty gestational sac 2 weeks after empty gestational sac on US, which are diagnostic for early pregnancy loss. - We discussed options for management including expectant management, medical management, and surgical management. We discussed the risks and benefits of each approach. - She opts for medical management. - Medical Management: Misoprostol, 800 mcg vaginally as a single dose, with a repeat dose given up to seven days later if there is no response to the first dose. We discussed the efficacy rates of medical management; including 70% completion rate by day 3 and 84% completion rate by day 8. We discussed that if she fails medical management, I would recommend surgical completion with dilation and evacuation.  - Blood type is Rh positive. - She was given precautions for heavy bleeding: e.g. bleeding that completely soaks two sanitary pads per hour for more than two hours or for pain that does not decrease after expulsion. Recurrent pregnancy loss  - After resolution of this pregnancy I recommend follow up to discuss workup for recurrent pregnancy loss. - We reviewed her previously elevated TSH as a potential cause. WE reviewed her insulin resistance and pre-diabetes which may also align with PCOS. - Recommend APLS labs, TSH, testosterone level, after resolution of this pregnancy. Diagnoses and all orders for this visit:    Miscarriage  -     miSOPROStol (Cytotec) 200 mcg tablet; Place four tablets vaginal for miscarriage    Missed ab    Recurrent pregnancy loss          Subjective:   Benny Hopper is a 24 y.o. [id number] female. CC: follow up    HPI: Tom Scott presents for follow up of suspected early pregnancy loss.   HCG was abnormally rising and two weeks ago the [address] showed an empty gestational sac. She denies any vaginal bleeding. ROS: Review of Systems   Constitutional: Negative for chills and fever. Respiratory: Negative for cough and shortness of breath. Cardiovascular: Negative for chest pain and leg swelling. Gastrointestinal: Negative for abdominal pain, nausea and vomiting. Genitourinary: Negative for dysuria, frequency and urgency. Neurological: Negative for dizziness, light-headedness and headaches. PFSH: The following portions of the patient's history were reviewed and updated as appropriate: allergies, current medications, past family history, past medical history, obstetric history, gynecologic history, past social history, past surgical history and problem list.       Objective:  /70   Ht 5' 3" (1.6 m)   Wt 122 kg (269 lb)   BMI 47.65 kg/m²    Physical Exam  Constitutional:       Appearance: Normal appearance. HENT:      Head: Normocephalic and atraumatic. Cardiovascular:      Rate and Rhythm: Normal rate. Pulmonary:      Effort: Pulmonary effort is normal.   Abdominal:      General: There is no distension. Tenderness: There is no abdominal tenderness. There is no guarding. Genitourinary:     Exam position: Lithotomy position. Pubic Area: No rash. Labia:         Right: No rash, tenderness or lesion. Left: No rash, tenderness or lesion. Urethra: No prolapse, urethral swelling or urethral lesion. Vagina: No vaginal discharge, erythema, tenderness, bleeding or lesions. Cervix: No cervical motion tenderness, discharge, friability or erythema. Lymphadenopathy:      Lower Body: No right inguinal adenopathy. No left inguinal adenopathy. Neurological:      Mental Status: She is alert.            Tre Lowe MD  [id number] [address] [facility]  8/8/2023 4:32 PM

## 2023-08-08 NOTE — ASSESSMENT & PLAN NOTE
- Today we discussed the ultrasound findings of persistent empty gestational sac 2 weeks after empty gestational sac on US, which are diagnostic for early pregnancy loss. - We discussed options for management including expectant management, medical management, and surgical management. We discussed the risks and benefits of each approach. - She opts for medical management. - Medical Management: Misoprostol, 800 mcg vaginally as a single dose, with a repeat dose given up to seven days later if there is no response to the first dose. We discussed the efficacy rates of medical management; including 70% completion rate by day 3 and 84% completion rate by day 8. We discussed that if she fails medical management, I would recommend surgical completion with dilation and evacuation.  - Blood type is Rh positive. - She was given precautions for heavy bleeding: e.g. bleeding that completely soaks two sanitary pads per hour for more than two hours or for pain that does not decrease after expulsion.

## 2023-08-17 ENCOUNTER — CLINICAL SUPPORT (OUTPATIENT)
Dept: FAMILY MEDICINE CLINIC | Facility: CLINIC | Age: 28
End: 2023-08-17
Payer: COMMERCIAL

## 2023-08-17 DIAGNOSIS — E53.8 VITAMIN B12 DEFICIENCY: Primary | ICD-10-CM

## 2023-08-17 PROCEDURE — 96372 THER/PROPH/DIAG INJ SC/IM: CPT | Performed by: PHYSICIAN ASSISTANT

## 2023-08-17 RX ADMIN — CYANOCOBALAMIN 1000 MCG: 1000 INJECTION, SOLUTION INTRAMUSCULAR; SUBCUTANEOUS at 10:32

## 2023-08-28 ENCOUNTER — TELEPHONE (OUTPATIENT)
Dept: OBGYN CLINIC | Facility: MEDICAL CENTER | Age: 28
End: 2023-08-28

## 2023-08-28 NOTE — TELEPHONE ENCOUNTER
Left message on vm to call back. When patient returns call, please advise she needs to completed the blood work we have placed for her in order to see actual numbers. Thank you.

## 2023-08-28 NOTE — TELEPHONE ENCOUNTER
Patient called to follow up on recent miscarriage, she was advise to wait until bleeding stops and gets negative pregnancy test. Patient said she is still having some bleeding and is getting + tests.  Patient would like to be advised for next steps, aware Dr Carlie Skinner is out

## 2023-09-05 ENCOUNTER — APPOINTMENT (OUTPATIENT)
Dept: LAB | Facility: MEDICAL CENTER | Age: 28
End: 2023-09-05
Payer: COMMERCIAL

## 2023-09-05 ENCOUNTER — CLINICAL SUPPORT (OUTPATIENT)
Dept: FAMILY MEDICINE CLINIC | Facility: CLINIC | Age: 28
End: 2023-09-05
Payer: COMMERCIAL

## 2023-09-05 DIAGNOSIS — E53.8 VITAMIN B12 DEFICIENCY: Primary | ICD-10-CM

## 2023-09-05 PROCEDURE — 96372 THER/PROPH/DIAG INJ SC/IM: CPT | Performed by: PHYSICIAN ASSISTANT

## 2023-09-05 RX ADMIN — CYANOCOBALAMIN 1000 MCG: 1000 INJECTION, SOLUTION INTRAMUSCULAR; SUBCUTANEOUS at 14:38

## 2023-09-07 ENCOUNTER — TELEPHONE (OUTPATIENT)
Dept: OBGYN CLINIC | Facility: MEDICAL CENTER | Age: 28
End: 2023-09-07

## 2023-09-09 DIAGNOSIS — F32.A ANXIETY AND DEPRESSION: ICD-10-CM

## 2023-09-09 DIAGNOSIS — F41.9 ANXIETY AND DEPRESSION: ICD-10-CM

## 2023-09-09 DIAGNOSIS — E55.9 VITAMIN D DEFICIENCY: ICD-10-CM

## 2023-09-11 RX ORDER — ERGOCALCIFEROL 1.25 MG/1
50000 CAPSULE ORAL WEEKLY
Qty: 12 CAPSULE | Refills: 0 | Status: SHIPPED | OUTPATIENT
Start: 2023-09-11

## 2023-09-11 RX ORDER — SERTRALINE HYDROCHLORIDE 25 MG/1
25 TABLET, FILM COATED ORAL DAILY
Qty: 90 TABLET | Refills: 0 | Status: SHIPPED | OUTPATIENT
Start: 2023-09-11

## 2023-09-11 RX ORDER — SERTRALINE HYDROCHLORIDE 100 MG/1
100 TABLET, FILM COATED ORAL DAILY
Qty: 90 TABLET | Refills: 0 | Status: SHIPPED | OUTPATIENT
Start: 2023-09-11

## 2023-09-12 ENCOUNTER — TELEPHONE (OUTPATIENT)
Dept: OBGYN CLINIC | Facility: CLINIC | Age: 28
End: 2023-09-12

## 2023-09-12 ENCOUNTER — TELEPHONE (OUTPATIENT)
Dept: FAMILY MEDICINE CLINIC | Facility: CLINIC | Age: 28
End: 2023-09-12

## 2023-09-12 DIAGNOSIS — O03.9 MISCARRIAGE: Primary | ICD-10-CM

## 2023-09-12 DIAGNOSIS — G25.81 RLS (RESTLESS LEGS SYNDROME): Primary | ICD-10-CM

## 2023-09-12 NOTE — TELEPHONE ENCOUNTER
Called patient and left message. Per provider on call, patient to get lab work drawn today. Last hcg was drawn last week.       We will contact patient after labs are resulted and reviewed by provider

## 2023-09-12 NOTE — TELEPHONE ENCOUNTER
Would recommend checking iron levels as iron depletion cause cause this or make it worse. Will order labs and then go from there.        ----- Message from Valentin Warren sent at 9/11/2023  6:56 AM EDT -----  Regarding: FW: Restless leg syndrome   Contact: [phone]    ----- Message -----  From: Ravi Magallon  Sent: 9/9/2023   7:49 PM EDT  To: [facility]  Subject: Restless leg syndrome                            Hi, I was wondering if I would need to make an appointment for this, but is there a way I can find out or a way to improve my restless leg syndrome? 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  Thank you

## 2023-09-12 NOTE — TELEPHONE ENCOUNTER
Pt called in about recent hcg labs- would like to know next steps as still showing positive and has been bleeding/ spotting for the past 4 weeks

## 2023-09-13 ENCOUNTER — APPOINTMENT (OUTPATIENT)
Dept: LAB | Facility: MEDICAL CENTER | Age: 28
End: 2023-09-13
Payer: COMMERCIAL

## 2023-09-13 DIAGNOSIS — G25.81 RLS (RESTLESS LEGS SYNDROME): ICD-10-CM

## 2023-09-13 DIAGNOSIS — O03.9 MISCARRIAGE: ICD-10-CM

## 2023-09-13 LAB
B-HCG SERPL-ACNC: 7 MIU/ML (ref 0–5)
ERYTHROCYTE [DISTWIDTH] IN BLOOD BY AUTOMATED COUNT: 18 % (ref 11.6–15.1)
FERRITIN SERPL-MCNC: 9 NG/ML (ref 11–307)
HCT VFR BLD AUTO: 40.3 % (ref 34.8–46.1)
HGB BLD-MCNC: 12.5 G/DL (ref 11.5–15.4)
IRON SATN MFR SERPL: 11 % (ref 15–50)
IRON SERPL-MCNC: 48 UG/DL (ref 50–212)
MCH RBC QN AUTO: 27 PG (ref 26.8–34.3)
MCHC RBC AUTO-ENTMCNC: 31 G/DL (ref 31.4–37.4)
MCV RBC AUTO: 87 FL (ref 82–98)
PLATELET # BLD AUTO: 331 THOUSANDS/UL (ref 149–390)
PMV BLD AUTO: 10.9 FL (ref 8.9–12.7)
RBC # BLD AUTO: 4.63 MILLION/UL (ref 3.81–5.12)
TIBC SERPL-MCNC: 441 UG/DL (ref 250–450)
UIBC SERPL-MCNC: 393 UG/DL (ref 155–355)
WBC # BLD AUTO: 10.2 THOUSAND/UL (ref 4.31–10.16)

## 2023-09-13 PROCEDURE — 83540 ASSAY OF IRON: CPT

## 2023-09-13 PROCEDURE — 85027 COMPLETE CBC AUTOMATED: CPT

## 2023-09-13 PROCEDURE — 83550 IRON BINDING TEST: CPT

## 2023-09-13 PROCEDURE — 36415 COLL VENOUS BLD VENIPUNCTURE: CPT

## 2023-09-13 PROCEDURE — 84702 CHORIONIC GONADOTROPIN TEST: CPT

## 2023-09-13 PROCEDURE — 82728 ASSAY OF FERRITIN: CPT

## 2023-09-14 ENCOUNTER — TELEPHONE (OUTPATIENT)
Dept: OBGYN CLINIC | Facility: MEDICAL CENTER | Age: 28
End: 2023-09-14

## 2023-09-14 ENCOUNTER — TELEPHONE (OUTPATIENT)
Dept: OBGYN CLINIC | Facility: CLINIC | Age: 28
End: 2023-09-14

## 2023-09-14 NOTE — TELEPHONE ENCOUNTER
----- Message from Miller Mccoy. Jeb Ga MD sent at 9/14/2023  3:37 PM EDT -----  RN phone call. Please let patient know her HCG is falling appropriately. No further HCGs are necessary but I would like to see her for a visit to discuss next steps for becoming pregnant in the future.      Thank you,   Saman Lugo MD  [address] [address]  9/14/2023 3:37 PM

## 2023-09-25 ENCOUNTER — TELEPHONE (OUTPATIENT)
Dept: OBGYN CLINIC | Facility: MEDICAL CENTER | Age: 28
End: 2023-09-25

## 2023-09-25 NOTE — TELEPHONE ENCOUNTER
Patient called into office in regards to following up on a [employer] message that she has not heard a response from yet in regards to blood work and low iron levels. Please review, thank you.

## 2023-10-03 ENCOUNTER — CLINICAL SUPPORT (OUTPATIENT)
Dept: FAMILY MEDICINE CLINIC | Facility: CLINIC | Age: 28
End: 2023-10-03
Payer: COMMERCIAL

## 2023-10-03 ENCOUNTER — TELEPHONE (OUTPATIENT)
Dept: FAMILY MEDICINE CLINIC | Facility: CLINIC | Age: 28
End: 2023-10-03

## 2023-10-03 DIAGNOSIS — Z11.1 SCREENING FOR TUBERCULOSIS: Primary | ICD-10-CM

## 2023-10-03 PROCEDURE — 86580 TB INTRADERMAL TEST: CPT | Performed by: PHYSICIAN ASSISTANT

## 2023-10-03 PROCEDURE — 96372 THER/PROPH/DIAG INJ SC/IM: CPT | Performed by: PHYSICIAN ASSISTANT

## 2023-10-03 RX ADMIN — CYANOCOBALAMIN 1000 MCG: 1000 INJECTION, SOLUTION INTRAMUSCULAR; SUBCUTANEOUS at 15:05

## 2023-10-03 NOTE — TELEPHONE ENCOUNTER
LMOM for pt to call office - Does she need PPD for the paper work she dropped off?    If yes she needs to schedule nurse appt

## 2023-10-05 LAB
INDURATION: 0 MM
TB SKIN TEST: NEGATIVE

## 2023-10-05 NOTE — TELEPHONE ENCOUNTER
Left message for patient.      advised to take PO oral iron supplementation every other day and that

## 2023-10-18 ENCOUNTER — OFFICE VISIT (OUTPATIENT)
Dept: OBGYN CLINIC | Facility: MEDICAL CENTER | Age: 28
End: 2023-10-18
Payer: COMMERCIAL

## 2023-10-18 VITALS
SYSTOLIC BLOOD PRESSURE: 116 MMHG | WEIGHT: 278.9 LBS | DIASTOLIC BLOOD PRESSURE: 68 MMHG | BODY MASS INDEX: 49.42 KG/M2 | HEIGHT: 63 IN

## 2023-10-18 DIAGNOSIS — N91.4 SECONDARY OLIGOMENORRHEA: ICD-10-CM

## 2023-10-18 DIAGNOSIS — E66.01 OBESITY, CLASS III, BMI 40-49.9 (MORBID OBESITY) (HCC): ICD-10-CM

## 2023-10-18 DIAGNOSIS — N96 RECURRENT PREGNANCY LOSS: Primary | ICD-10-CM

## 2023-10-18 PROBLEM — E66.813 OBESITY, CLASS III, BMI 40-49.9 (MORBID OBESITY): Status: ACTIVE | Noted: 2023-10-18

## 2023-10-18 PROCEDURE — 99214 OFFICE O/P EST MOD 30 MIN: CPT | Performed by: STUDENT IN AN ORGANIZED HEALTH CARE EDUCATION/TRAINING PROGRAM

## 2023-10-18 NOTE — ASSESSMENT & PLAN NOTE
- We reviewed her cycle length greater than 35 days and fasting insulin levels that are elevated. I suspect PCOS. Recommend checking testosterone level. - We discussed that studies suggest an association between PCOS and early pregnancy loss. We discussed the role of meformin and new studies suggesting that metformin use prior to pregnancy and in first trimester may reduce risk of first trimester miscarriage in patients with PCOS. - We reviewed the role of progesterone (400 mg vaginal twice daily) after ovulation for PCOS and RPL per Dr. Paige Johnson from [facility].

## 2023-10-18 NOTE — ASSESSMENT & PLAN NOTE
- We discussed the role of weight loss to improve and optimize her health prior to pregnancy. - We discussed dietary and exercise recommendations for maintaining good health.

## 2023-10-18 NOTE — PROGRESS NOTES
[facility] [address] [place] [address]    Assessment/Plan:  Lauren Alvarez is a 24 y.o. [id number] who presents with secondary oligomenorrhea and recurrent pregnancy loss. Recurrent pregnancy loss  - We reviewed her cycle length greater than 35 days and fasting insulin levels that are elevated. I suspect PCOS. Recommend checking testosterone level. - We discussed that studies suggest an association between PCOS and early pregnancy loss. We discussed the role of meformin and new studies suggesting that metformin use prior to pregnancy and in first trimester may reduce risk of first trimester miscarriage in patients with PCOS. - We reviewed the role of progesterone (400 mg vaginal twice daily) after ovulation for PCOS and RPL per Dr. Cesilia Barclay from [facility]. Obesity, Class III, BMI 40-49.9 (morbid obesity) ([facility])  - We discussed the role of weight loss to improve and optimize her health prior to pregnancy. - We discussed dietary and exercise recommendations for maintaining good health. Diagnoses and all orders for this visit:    Recurrent pregnancy loss  -     Beta-2 glycoprotein antibodies; Future  -     Cardiolipin antibody; Future  -     Lupus anticoagulant; Future  -     TSH, 3rd generation with Free T4 reflex; Future    Secondary oligomenorrhea  -     Luteinizing hormone; Future  -     Follicle stimulating hormone; Future  -     Testosterone; Future  -     Antimullerian hormone (AMH); Future    Obesity, Class III, BMI 40-49.9 (morbid obesity) ([facility])          Subjective:   Lauren Alvarez is a 24 y.o. [id number] female. CC: Follow up recurrent miscarriage    HPI: [facility] follows up to discuss becoming pregnant after two miscarriages in the last year. Her cycles are long, about 31-36 days. She has not used home OPK but has conceived during her expected ovulation window. She is prediabetic and has had about a 50 lb weight gain in the last 2 years.         ROS: Review of Systems   Constitutional:  Negative for chills and fever. Respiratory:  Negative for cough and shortness of breath. Cardiovascular:  Negative for chest pain and leg swelling. Gastrointestinal:  Negative for abdominal pain, nausea and vomiting. Genitourinary:  Negative for dysuria, frequency and urgency. Neurological:  Negative for dizziness, light-headedness and headaches. PFSH: The following portions of the patient's history were reviewed and updated as appropriate: allergies, current medications, past family history, past medical history, obstetric history, gynecologic history, past social history, past surgical history and problem list.       Objective:  /68   Ht 5' 3" (1.6 m)   Wt 127 kg (278 lb 14.4 oz)   LMP 10/03/2023   BMI 49.40 kg/m²    Physical Exam  Chaperone present: chaparone offered, patient declined. Constitutional:       Appearance: Normal appearance. HENT:      Head: Normocephalic and atraumatic. Cardiovascular:      Rate and Rhythm: Normal rate. Pulmonary:      Effort: Pulmonary effort is normal.   Chest:   Breasts:     Breasts are symmetrical.      Right: Normal. No swelling, bleeding, inverted nipple, mass, nipple discharge, skin change or tenderness. Left: Normal. No swelling, bleeding, inverted nipple, mass, nipple discharge, skin change or tenderness. Abdominal:      General: There is no distension. Tenderness: There is no abdominal tenderness. There is no guarding. Genitourinary:     Exam position: Lithotomy position. Pubic Area: No rash. Labia:         Right: No rash, tenderness or lesion. Left: No rash, tenderness or lesion. Urethra: No prolapse, urethral swelling or urethral lesion. Vagina: Normal. No vaginal discharge, erythema, tenderness, bleeding or lesions. Cervix: No cervical motion tenderness, discharge, friability or erythema. Uterus: Not enlarged, not tender and no uterine prolapse. Adnexa:         Right: No mass, tenderness or fullness. Left: No mass, tenderness or fullness. Lymphadenopathy:      Upper Body:      Right upper body: No axillary adenopathy. Left upper body: No axillary adenopathy. Lower Body: No right inguinal adenopathy. No left inguinal adenopathy. Neurological:      Mental Status: She is alert. I have spent a total time of 30 minutes on 10/18/23 in caring for this patient including Risk factor reductions and Counseling / Coordination of care.     Gladys Das MD  [id number] [address] [facility]  10/18/2023 5:02 PM

## 2023-10-19 ENCOUNTER — APPOINTMENT (OUTPATIENT)
Dept: LAB | Facility: MEDICAL CENTER | Age: 28
End: 2023-10-19
Payer: COMMERCIAL

## 2023-10-19 DIAGNOSIS — N91.4 SECONDARY OLIGOMENORRHEA: ICD-10-CM

## 2023-10-19 DIAGNOSIS — N96 RECURRENT PREGNANCY LOSS: ICD-10-CM

## 2023-10-19 LAB
FSH SERPL-ACNC: 7.9 MIU/ML
LH SERPL-ACNC: 9.8 MIU/ML
TESTOST SERPL-MSCNC: 42 NG/DL
TSH SERPL DL<=0.05 MIU/L-ACNC: 2.88 UIU/ML (ref 0.45–4.5)

## 2023-10-19 PROCEDURE — 83002 ASSAY OF GONADOTROPIN (LH): CPT

## 2023-10-19 PROCEDURE — 86146 BETA-2 GLYCOPROTEIN ANTIBODY: CPT

## 2023-10-19 PROCEDURE — 84403 ASSAY OF TOTAL TESTOSTERONE: CPT

## 2023-10-19 PROCEDURE — 36415 COLL VENOUS BLD VENIPUNCTURE: CPT

## 2023-10-19 PROCEDURE — 83001 ASSAY OF GONADOTROPIN (FSH): CPT

## 2023-10-19 PROCEDURE — 84443 ASSAY THYROID STIM HORMONE: CPT

## 2023-10-19 PROCEDURE — 86147 CARDIOLIPIN ANTIBODY EA IG: CPT

## 2023-10-19 PROCEDURE — 85670 THROMBIN TIME PLASMA: CPT

## 2023-10-19 PROCEDURE — 85705 THROMBOPLASTIN INHIBITION: CPT

## 2023-10-19 PROCEDURE — 82397 CHEMILUMINESCENT ASSAY: CPT

## 2023-10-19 PROCEDURE — 85732 THROMBOPLASTIN TIME PARTIAL: CPT

## 2023-10-19 PROCEDURE — 85613 RUSSELL VIPER VENOM DILUTED: CPT

## 2023-10-20 LAB
B2 GLYCOPROT1 IGA SERPL IA-ACNC: 1
B2 GLYCOPROT1 IGG SERPL IA-ACNC: 0.9
B2 GLYCOPROT1 IGM SERPL IA-ACNC: <2.4
CARDIOLIPIN IGA SER IA-ACNC: 1.8
CARDIOLIPIN IGG SER IA-ACNC: 1
CARDIOLIPIN IGM SER IA-ACNC: 1.7

## 2023-10-23 LAB
APTT SCREEN TO CONFIRM RATIO: 1.17 RATIO (ref 0–1.34)
CONFIRM APTT/NORMAL: 39.2 SEC (ref 0–47.6)
DRVVT IMM 1:2 NP PPP: 42.2 SEC (ref 0–40.4)
DRVVT SCREEN TO CONFIRM RATIO: 1.2 RATIO (ref 0.8–1.2)
LA PPP-IMP: ABNORMAL
SCREEN APTT: 35.7 SEC (ref 0–43.5)
SCREEN DRVVT: 47.8 SEC (ref 0–47)
THROMBIN TIME: 17.5 SEC (ref 0–23)

## 2023-10-25 LAB — MIS SERPL-MCNC: 4.55 NG/ML

## 2023-11-07 ENCOUNTER — TELEPHONE (OUTPATIENT)
Dept: OBGYN CLINIC | Facility: MEDICAL CENTER | Age: 28
End: 2023-11-07

## 2023-11-13 DIAGNOSIS — E53.8 VITAMIN B12 DEFICIENCY: ICD-10-CM

## 2023-11-13 DIAGNOSIS — F41.9 ANXIETY AND DEPRESSION: ICD-10-CM

## 2023-11-13 DIAGNOSIS — F32.A ANXIETY AND DEPRESSION: ICD-10-CM

## 2023-11-13 RX ORDER — SERTRALINE HYDROCHLORIDE 25 MG/1
25 TABLET, FILM COATED ORAL DAILY
Qty: 90 TABLET | Refills: 0 | Status: SHIPPED | OUTPATIENT
Start: 2023-11-13

## 2023-11-13 RX ORDER — SERTRALINE HYDROCHLORIDE 100 MG/1
100 TABLET, FILM COATED ORAL DAILY
Qty: 90 TABLET | Refills: 0 | Status: SHIPPED | OUTPATIENT
Start: 2023-11-13

## 2023-11-13 RX ORDER — CYANOCOBALAMIN 1000 UG/ML
1000 INJECTION, SOLUTION INTRAMUSCULAR; SUBCUTANEOUS
Qty: 3 ML | Refills: 0 | Status: SHIPPED | OUTPATIENT
Start: 2023-11-13

## 2023-12-11 ENCOUNTER — PATIENT MESSAGE (OUTPATIENT)
Dept: OBGYN CLINIC | Facility: MEDICAL CENTER | Age: 28
End: 2023-12-11

## 2023-12-11 DIAGNOSIS — N96 RECURRENT PREGNANCY LOSS: Primary | ICD-10-CM

## 2023-12-11 RX ORDER — PROGESTERONE 200 MG/1
200 CAPSULE ORAL 2 TIMES DAILY
Qty: 60 CAPSULE | Refills: 2 | Status: SHIPPED | OUTPATIENT
Start: 2023-12-11 | End: 2024-02-09

## 2023-12-12 ENCOUNTER — TELEPHONE (OUTPATIENT)
Dept: FAMILY MEDICINE CLINIC | Facility: CLINIC | Age: 28
End: 2023-12-12

## 2023-12-12 DIAGNOSIS — F41.9 ANXIETY AND DEPRESSION: ICD-10-CM

## 2023-12-12 DIAGNOSIS — F32.A ANXIETY AND DEPRESSION: ICD-10-CM

## 2023-12-12 DIAGNOSIS — E55.9 VITAMIN D DEFICIENCY: ICD-10-CM

## 2023-12-12 RX ORDER — SERTRALINE HYDROCHLORIDE 100 MG/1
100 TABLET, FILM COATED ORAL DAILY
Qty: 90 TABLET | Refills: 0 | Status: SHIPPED | OUTPATIENT
Start: 2023-12-12

## 2023-12-12 RX ORDER — SERTRALINE HYDROCHLORIDE 25 MG/1
25 TABLET, FILM COATED ORAL DAILY
Qty: 90 TABLET | Refills: 0 | Status: SHIPPED | OUTPATIENT
Start: 2023-12-12

## 2023-12-12 RX ORDER — ERGOCALCIFEROL 1.25 MG/1
50000 CAPSULE ORAL WEEKLY
Qty: 12 CAPSULE | Refills: 0 | Status: SHIPPED | OUTPATIENT
Start: 2023-12-12

## 2023-12-12 NOTE — TELEPHONE ENCOUNTER
Recommend she speak with her GYN. I agree, some of it is probably due to her anti-depressant. Unfortunately, all anti-depressants can do this. However, if she felt this way prior to taking the Zoloft, maybe GYN would have some other ideas. ----- Message from Noemi Cho sent at 12/12/2023  9:01 AM EST -----  Regarding: FW: Low sex drive  Contact: [phone]    ----- Message -----  From: Barbara Magallon  Sent: 12/12/2023   8:57 AM EST  To: [facility]  Subject: Low sex drive                                    Yoel, I wanted to reach out to see if there was anything I can take for having an extremely low sex drive? I know Zoloft can affect it, but I've always had a low sex drive and my antidepressants aren't helping. Is there anything out there that may help?  Thank you

## 2023-12-24 ENCOUNTER — NURSE TRIAGE (OUTPATIENT)
Dept: OTHER | Facility: OTHER | Age: 28
End: 2023-12-24

## 2023-12-24 NOTE — TELEPHONE ENCOUNTER
"Regarding: medication reaction  ----- Message from Debbie Pichardo sent at 12/24/2023  8:10 AM EST -----  \"I have been taking metFORMIN for about a month now because I suffer from miscarriages. I am now bleeding and it doesn't seem normal, not period related. I believe that it may be from the medication. Should I stop taking it?\"    "

## 2023-12-24 NOTE — TELEPHONE ENCOUNTER
Patient calling in this morning reporting some abnormal vaginal bleeding. Patient stated that she just recently had her period 12/10-12/16. Yesterday she stared with some light vaginal bleeding- stated she has been wearing panty liners and that the bleeding has been less than her normal period. Patient stated she has been taking Metformin due to miscarriage and is unsure if that could be causing the abnormal bleeding. Patient stated she is currently trying to get pregnant and did have intercourse on 12/21 and that it was slightly more painful than normal. Patient is supposed to start taking oral progesterone today but was unsure if she should. On call provider contacted, stated the abnormal vaginal bleeding could be related to ovulation or could be related to her recent intercourse. Stated the vaginal bleeding should resolve over the next 24-48 hours. Stated patient is okay to take her progesterone as scheduled. Patient made aware and verbalized understanding.   Reason for Disposition  • Bleeding or spotting occurs after sex (Exception: first intercourse)    Protocols used: Vaginal Bleeding - Abnormal-ADULT-

## 2023-12-24 NOTE — TELEPHONE ENCOUNTER
"Answer Assessment - Initial Assessment Questions  1. AMOUNT: \"Describe the bleeding that you are having.\"     - SPOTTING: spotting, or pinkish / brownish mucous discharge; does not fill panti-liner or pad     - MILD:  less than 1 pad / hour; less than patient's usual menstrual bleeding    - MODERATE: 1-2 pads / hour; 1 menstrual cup every 6 hours; small-medium blood clots (e.g., pea, grape, small coin)    - SEVERE: soaking 2 or more pads/hour for 2 or more hours; 1 menstrual cup every 2 hours; bleeding not contained by pads or continuous red blood from vagina; large blood clots (e.g., golf ball, large coin)       Less than a normal period       Wearing panty liners    2. ONSET: \"When did the bleeding begin?\" \"Is it continuing now?\"      Yesterday     3. MENSTRUAL PERIOD: \"When was the last normal menstrual period?\" \"How is this different than your period?\"      Period on 12/10-12/16    4. REGULARITY: \"How regular are your periods?\"      Regular periods     5. ABDOMINAL PAIN: \"Do you have any pain?\" \"How bad is the pain?\"  (e.g., Scale 1-10; mild, moderate, or severe)    - MILD (1-3): doesn't interfere with normal activities, abdomen soft and not tender to touch     - MODERATE (4-7): interferes with normal activities or awakens from sleep, tender to touch     - SEVERE (8-10): excruciating pain, doubled over, unable to do any normal activities       Denies       6. PREGNANCY: \"Could you be pregnant?\" \"Are you sexually active?\" \"Did you recently give birth?\"      Has tried on 12/21- intercourse was uncomfortable     7. BREASTFEEDING: \"Are you breastfeeding?\"      Denies     8. HORMONES: \"Are you taking any hormone medications, prescription or OTC?\" (e.g., birth control pills, estrogen)      Denies     9. BLOOD THINNERS: \"Do you take any blood thinners?\" (e.g., Coumadin/warfarin, Pradaxa/dabigatran, aspirin)      Denies     10. CAUSE: \"What do you think is causing the bleeding?\" (e.g., recent gyn surgery, recent gyn " "procedure; known bleeding disorder, cervical cancer, polycystic ovarian disease, fibroids)          Started Metformin about a month ago     11. HEMODYNAMIC STATUS: \"Are you weak or feeling lightheaded?\" If Yes, ask: \"Can you stand and walk normally?\"         Denies     12. OTHER SYMPTOMS: \"What other symptoms are you having with the bleeding?\" (e.g., passed tissue, vaginal discharge, fever, menstrual-type cramps)        Was supposed to start Progesterone today- Oral pill    Protocols used: Vaginal Bleeding - Abnormal-ADULT-AH    "

## 2024-01-11 DIAGNOSIS — E53.8 VITAMIN B12 DEFICIENCY: ICD-10-CM

## 2024-01-11 DIAGNOSIS — E55.9 VITAMIN D DEFICIENCY: ICD-10-CM

## 2024-01-11 DIAGNOSIS — F32.A ANXIETY AND DEPRESSION: ICD-10-CM

## 2024-01-11 DIAGNOSIS — F41.9 ANXIETY AND DEPRESSION: ICD-10-CM

## 2024-01-11 RX ORDER — SERTRALINE HYDROCHLORIDE 100 MG/1
100 TABLET, FILM COATED ORAL DAILY
Qty: 90 TABLET | Refills: 0 | Status: SHIPPED | OUTPATIENT
Start: 2024-01-11

## 2024-01-11 RX ORDER — CYANOCOBALAMIN 1000 UG/ML
1000 INJECTION, SOLUTION INTRAMUSCULAR; SUBCUTANEOUS
Qty: 3 ML | Refills: 0 | Status: SHIPPED | OUTPATIENT
Start: 2024-01-11

## 2024-01-11 RX ORDER — SERTRALINE HYDROCHLORIDE 25 MG/1
25 TABLET, FILM COATED ORAL DAILY
Qty: 90 TABLET | Refills: 0 | Status: SHIPPED | OUTPATIENT
Start: 2024-01-11

## 2024-01-11 RX ORDER — ERGOCALCIFEROL 1.25 MG/1
50000 CAPSULE ORAL WEEKLY
Qty: 12 CAPSULE | Refills: 0 | Status: SHIPPED | OUTPATIENT
Start: 2024-01-11

## 2024-01-17 ENCOUNTER — OFFICE VISIT (OUTPATIENT)
Dept: OBGYN CLINIC | Facility: MEDICAL CENTER | Age: 29
End: 2024-01-17
Payer: COMMERCIAL

## 2024-01-17 VITALS
SYSTOLIC BLOOD PRESSURE: 124 MMHG | WEIGHT: 285.1 LBS | BODY MASS INDEX: 50.52 KG/M2 | HEIGHT: 63 IN | DIASTOLIC BLOOD PRESSURE: 78 MMHG

## 2024-01-17 DIAGNOSIS — E28.2 PCOS (POLYCYSTIC OVARIAN SYNDROME): ICD-10-CM

## 2024-01-17 PROCEDURE — 99212 OFFICE O/P EST SF 10 MIN: CPT | Performed by: OBSTETRICS & GYNECOLOGY

## 2024-01-22 NOTE — PROGRESS NOTES
Assessment Paz was seen today for menstrual problem.    Diagnoses and all orders for this visit:    Body mass index (BMI) 50.0-59.9, adult (HCC)  -     Ambulatory Referral to Weight Management; Future    PCOS (polycystic ovarian syndrome)  -     Ambulatory Referral to Weight Management; Future         Plan  We discussed metformin use and    progesterone use as per her last visit with Dr Wheeler:  - We discussed that studies suggest an association between PCOS and early pregnancy loss.  We discussed the role of meformin and new studies suggesting that metformin use prior to pregnancy and in first trimester may reduce risk of first trimester miscarriage in patients with PCOS.  - We reviewed the role of progesterone (400 mg vaginal twice daily) after ovulation for PCOS and RPL per Dr. Budinetz from [facility].    Aware I support previous recommendation and medications  We did discuss BMI and her wanting to achieve lower BMI as she prepared for future pregnancy    She had questions about Optavia diet as she had met with  about this - we discussed pros and cons   We also discussed having consultation with medical weight management team as they certainly could support her and offer safe options for weight loss   All questions answered        Subjective   Paz Pierre is a 28 y.o. female here for questions regarding medication she was initiated on and future fertility . Contemplating attempting to conceive  in near future but desires to get healthier        Patient Active Problem List   Diagnosis    Vitamin D deficiency    Arcuate uterus    Anxiety and depression    Vitamin B12 deficiency    Miscarriage    Class 3 obesity ([facility])    Recurrent pregnancy loss    Obesity, Class III, BMI 40-49.9 (morbid obesity) (Former[facility])       Gynecologic History  Patient's last menstrual period was 01/08/2024 (exact date).  The current method of family planning is none.    Past Medical History:   Diagnosis Date    Anxiety     Asthma      as a child    Depression     Diet controlled gestational diabetes mellitus (GDM) in third trimester 3/3/2021    Miscarriage 2023    Recurrent pregnancy loss 2023    Urinary tract infection      Past Surgical History:   Procedure Laterality Date    [place]  DELIVERY ONLY N/A 2021    Procedure:  SECTION ();  Surgeon: Kassandra Martinez MD;  Location: [facility];  Service: Obstetrics     Family History   Problem Relation Age of Onset    Asthma Mother     Diabetes Maternal Grandmother     Hypertension Maternal Grandmother     Thyroid disease Maternal Grandmother     Breast cancer Neg Hx     Colon cancer Neg Hx     Ovarian cancer Neg Hx      Social History     Socioeconomic History    Marital status: /Civil Union     Spouse name: Not on file    Number of children: Not on file    Years of education: Not on file    Highest education level: Not on file   Occupational History    Not on file   Tobacco Use    Smoking status: Never    Smokeless tobacco: Never   Vaping Use    Vaping status: Never Used   Substance and Sexual Activity    Alcohol use: Not Currently    Drug use: Never    Sexual activity: Yes     Partners: Male     Birth control/protection: Condom Male   Other Topics Concern    Not on file   Social History Narrative    Not on file     Social Determinants of Health     Financial Resource Strain: Not on file   Food Insecurity: Not on file   Transportation Needs: Not on file   Physical Activity: Not on file   Stress: Not on file   Social Connections: Not on file   Intimate Partner Violence: Not on file   Housing Stability: Not on file     No Known Allergies    Current Outpatient Medications:     ergocalciferol (VITAMIN D2) 50,000 units, Take 1 capsule (50,000 Units total) by mouth once a week, Disp: 12 capsule, Rfl: 0    sertraline (ZOLOFT) 100 mg tablet, Take 1 tablet (100 mg total) by mouth daily, Disp: 90 tablet, Rfl: 0    sertraline (Zoloft) 25 mg tablet, Take 1 tablet (25 mg  total) by mouth daily, Disp: 90 tablet, Rfl: 0    cyanocobalamin 1,000 mcg/mL, Inject 1 mL (1,000 mcg total) into a muscle every 30 (thirty) days (Patient not taking: Reported on 1/17/2024), Disp: 3 mL, Rfl: 0    ibuprofen (MOTRIN) 600 mg tablet, Take 1 tablet (600 mg total) by mouth every 6 (six) hours as needed for mild pain, Disp: 30 tablet, Rfl: 0    metFORMIN (GLUCOPHAGE-XR) 500 mg 24 hr tablet, Take 2 tablets (1,000 mg total) by mouth daily with dinner (Patient not taking: Reported on 1/17/2024), Disp: 60 tablet, Rfl: 11    miSOPROStol (Cytotec) 200 mcg tablet, Place four tablets vaginal for miscarriage (Patient not taking: Reported on 10/18/2023), Disp: 4 tablet, Rfl: 0    Progesterone 200 MG CAPS, Take 200 mg by mouth 2 (two) times a day Start 2-3 days after positive ovulation test. (Patient not taking: Reported on 1/17/2024), Disp: 60 capsule, Rfl: 2    Current Facility-Administered Medications:     cyanocobalamin injection 1,000 mcg, 1,000 mcg, Intramuscular, Q30 Days, Denise Baca PA-C, 1,000 mcg at 10/03/23 1505    Review of Systems  Constitutional :no fever, feels well, no tiredness, no recent weight gain or loss  ENT: no ear ache, no loss of hearing, no nosebleeds or nasal discharge, no sore throat or hoarseness.  Cardiovascular: no complaints of slow or fast heart beat, no chest pain, no palpitations, no leg claudication or lower extremity edema.  Respiratory: no complaints of shortness of shortness of breath, no SALINAS  Breasts:no complaints of breast pain, breast lump, or nipple discharge  Gastrointestinal: no complaints of abdominal pain, constipation, nausea, vomiting, or diarrhea or bloody stools  Genitourinary : no complaints of dysuria, incontinence, pelvic pain, no dysmenorrhea, vaginal discharge or abnormal vaginal bleeding and as noted in HPI.  Musculoskeletal: no complaints of arthralgia, no myalgia, no joint swelling or stiffness, no limb pain or swelling.  Integumentary: no complaints of  "skin rash or lesion, itching or dry skin  Neurological: no complaints of headache, no confusion, no numbness or tingling, no dizziness or fainting     Objective     /78   Ht 5' 3\" (1.6 m)   Wt 129 kg (285 lb 1.6 oz)   LMP 01/08/2024 (Exact Date)   BMI 50.50 kg/m²     General:   appears stated age, cooperative, alert normal mood and affect   Lungs: Unlabored breathing    Skin normal skin turgor and no rashes.   Psychiatric orientation to person, place, and time: normal. mood and affect: normal      "

## 2024-01-24 ENCOUNTER — OFFICE VISIT (OUTPATIENT)
Dept: FAMILY MEDICINE CLINIC | Facility: CLINIC | Age: 29
End: 2024-01-24
Payer: COMMERCIAL

## 2024-01-24 VITALS
SYSTOLIC BLOOD PRESSURE: 128 MMHG | BODY MASS INDEX: 51.38 KG/M2 | HEART RATE: 76 BPM | WEIGHT: 290 LBS | OXYGEN SATURATION: 97 % | TEMPERATURE: 96.9 F | RESPIRATION RATE: 19 BRPM | HEIGHT: 63 IN | DIASTOLIC BLOOD PRESSURE: 76 MMHG

## 2024-01-24 DIAGNOSIS — E66.01 CLASS 3 SEVERE OBESITY DUE TO EXCESS CALORIES WITHOUT SERIOUS COMORBIDITY WITH BODY MASS INDEX (BMI) OF 50.0 TO 59.9 IN ADULT (HCC): Primary | ICD-10-CM

## 2024-01-24 PROBLEM — E66.813 CLASS 3 OBESITY: Status: RESOLVED | Noted: 2023-06-05 | Resolved: 2024-01-24

## 2024-01-24 PROBLEM — E66.813 CLASS 3 SEVERE OBESITY DUE TO EXCESS CALORIES WITHOUT SERIOUS COMORBIDITY WITH BODY MASS INDEX (BMI) OF 50.0 TO 59.9 IN ADULT (HCC): Status: ACTIVE | Noted: 2023-10-18

## 2024-01-24 PROCEDURE — 99213 OFFICE O/P EST LOW 20 MIN: CPT | Performed by: PHYSICIAN ASSISTANT

## 2024-01-24 NOTE — ASSESSMENT & PLAN NOTE
Pt to call her insurance to see if she has any coverage for weight loss medications. She is unsure if she wants to try medication management or diet management such as with Optavia. If she wishes to try Wegovy, will call me and I will send Rx. If she wishes to try oral or a newer medication such as Zepbound, pt to let me know and will refer her to weight management.

## 2024-01-24 NOTE — PROGRESS NOTES
Name: Paz Pierre      : 1995      MRN: [id number]  Encounter Provider: Denise Baca PA-C  Encounter Date: 2024   Encounter department: [facility]    Assessment & Plan     1. Class 3 severe obesity due to excess calories without serious comorbidity with body mass index (BMI) of 50.0 to 59.9 in adult (HCC)  Assessment & Plan:  Pt to call her insurance to see if she has any coverage for weight loss medications. She is unsure if she wants to try medication management or diet management such as with Optavia. If she wishes to try Wegovy, will call me and I will send Rx. If she wishes to try oral or a newer medication such as Zepbound, pt to let me know and will refer her to weight management.          Depression Screening and Follow-up Plan: Patient was screened for depression during today's encounter. They screened negative with a PHQ-9 score of 0.        Pearl Mullen is here today to discuss weight loss options. She is interested in conceiving again, however wants to lose some weight prior.   She's considering Optavia, Wegovy, or even other options. She has not tried calorie counting/calorie defecit. She reports that her biggest/worst meal is in the evening, tends to overeat.       Review of Systems   Constitutional:  Positive for unexpected weight change. Negative for chills and fever.   HENT:  Negative for ear pain and sore throat.    Eyes:  Negative for pain and visual disturbance.   Respiratory:  Negative for cough and shortness of breath.    Cardiovascular:  Negative for chest pain and palpitations.   Gastrointestinal:  Negative for abdominal pain and vomiting.   Genitourinary:  Negative for dysuria and hematuria.   Musculoskeletal:  Negative for arthralgias and back pain.   Skin:  Negative for color change and rash.   Neurological:  Negative for seizures and syncope.   All other systems reviewed and are negative.      Current Outpatient Medications on File Prior to  "Visit   Medication Sig   • ergocalciferol (VITAMIN D2) 50,000 units Take 1 capsule (50,000 Units total) by mouth once a week   • metFORMIN (GLUCOPHAGE-XR) 500 mg 24 hr tablet Take 2 tablets (1,000 mg total) by mouth daily with dinner   • Progesterone 200 MG CAPS Take 200 mg by mouth 2 (two) times a day Start 2-3 days after positive ovulation test.   • sertraline (ZOLOFT) 100 mg tablet Take 1 tablet (100 mg total) by mouth daily   • sertraline (Zoloft) 25 mg tablet Take 1 tablet (25 mg total) by mouth daily   • cyanocobalamin 1,000 mcg/mL Inject 1 mL (1,000 mcg total) into a muscle every 30 (thirty) days (Patient not taking: Reported on 1/17/2024)   • ibuprofen (MOTRIN) 600 mg tablet Take 1 tablet (600 mg total) by mouth every 6 (six) hours as needed for mild pain   • miSOPROStol (Cytotec) 200 mcg tablet Place four tablets vaginal for miscarriage (Patient not taking: Reported on 10/18/2023)       Objective     /76 (BP Location: Left arm, Patient Position: Sitting, Cuff Size: Large)   Pulse 76   Temp (!) 96.9 °F (36.1 °C) (Temporal)   Resp 19   Ht 5' 3\" (1.6 m)   Wt 132 kg (290 lb)   LMP 01/08/2024 (Exact Date)   SpO2 97%   BMI 51.37 kg/m²     Physical Exam  Vitals reviewed.   Constitutional:       General: She is not in acute distress.     Appearance: She is well-developed. She is obese. She is not diaphoretic.   HENT:      Head: Normocephalic and atraumatic.      Right Ear: Hearing, tympanic membrane, ear canal and external ear normal.      Left Ear: Hearing, tympanic membrane, ear canal and external ear normal.      Nose: Nose normal.      Mouth/Throat:      Mouth: Mucous membranes are moist.      Pharynx: Oropharynx is clear. Uvula midline. No oropharyngeal exudate.   Eyes:      General: No scleral icterus.        Right eye: No discharge.         Left eye: No discharge.      Conjunctiva/sclera: Conjunctivae normal.   Neck:      Thyroid: No thyromegaly.      Vascular: No carotid bruit. "   Cardiovascular:      Rate and Rhythm: Normal rate and regular rhythm.      Heart sounds: Normal heart sounds. No murmur heard.  Pulmonary:      Effort: Pulmonary effort is normal. No respiratory distress.      Breath sounds: Normal breath sounds. No wheezing.   Abdominal:      General: Bowel sounds are normal. There is no distension.      Palpations: Abdomen is soft. There is no mass.      Tenderness: There is no abdominal tenderness. There is no guarding or rebound.   Musculoskeletal:         General: No tenderness. Normal range of motion.      Cervical back: Neck supple.   Lymphadenopathy:      Cervical: No cervical adenopathy.   Skin:     General: Skin is warm and dry.      Findings: No erythema or rash.   Neurological:      Mental Status: She is alert and oriented to person, place, and time.   Psychiatric:         Behavior: Behavior normal.         Thought Content: Thought content normal.         Judgment: Judgment normal.       Denise Baca PA-C

## 2024-01-29 ENCOUNTER — TELEPHONE (OUTPATIENT)
Dept: BARIATRICS | Facility: CLINIC | Age: 29
End: 2024-01-29

## 2024-01-29 NOTE — TELEPHONE ENCOUNTER
I called and left message for the patient to call the office back to schedule an appointment with weight management.

## 2024-02-05 DIAGNOSIS — N96 HISTORY OF MULTIPLE MISCARRIAGES: ICD-10-CM

## 2024-02-05 DIAGNOSIS — N91.2 AMENORRHEA: Primary | ICD-10-CM

## 2024-02-07 ENCOUNTER — APPOINTMENT (OUTPATIENT)
Dept: LAB | Facility: MEDICAL CENTER | Age: 29
End: 2024-02-07
Payer: COMMERCIAL

## 2024-02-07 DIAGNOSIS — N91.2 AMENORRHEA: ICD-10-CM

## 2024-02-07 DIAGNOSIS — N96 HISTORY OF MULTIPLE MISCARRIAGES: ICD-10-CM

## 2024-02-07 LAB
B-HCG SERPL-ACNC: 46 MIU/ML (ref 0–5)
PROGEST SERPL-MCNC: 19.21 NG/ML

## 2024-02-07 PROCEDURE — 36415 COLL VENOUS BLD VENIPUNCTURE: CPT

## 2024-02-07 PROCEDURE — 84702 CHORIONIC GONADOTROPIN TEST: CPT

## 2024-02-07 PROCEDURE — 84144 ASSAY OF PROGESTERONE: CPT

## 2024-02-08 ENCOUNTER — TELEPHONE (OUTPATIENT)
Dept: OBGYN CLINIC | Facility: MEDICAL CENTER | Age: 29
End: 2024-02-08

## 2024-02-08 DIAGNOSIS — N96 RECURRENT PREGNANCY LOSS: ICD-10-CM

## 2024-02-08 DIAGNOSIS — E55.9 VITAMIN D DEFICIENCY: ICD-10-CM

## 2024-02-08 RX ORDER — PROGESTERONE 200 MG/1
200 CAPSULE ORAL 2 TIMES DAILY
Qty: 60 CAPSULE | Refills: 0 | Status: SHIPPED | OUTPATIENT
Start: 2024-02-08 | End: 2024-02-14

## 2024-02-08 RX ORDER — ERGOCALCIFEROL 1.25 MG/1
50000 CAPSULE ORAL WEEKLY
Qty: 12 CAPSULE | Refills: 0 | Status: SHIPPED | OUTPATIENT
Start: 2024-02-08

## 2024-02-10 ENCOUNTER — LAB (OUTPATIENT)
Dept: LAB | Facility: HOSPITAL | Age: 29
End: 2024-02-10
Payer: COMMERCIAL

## 2024-02-10 DIAGNOSIS — N91.2 AMENORRHEA: ICD-10-CM

## 2024-02-10 DIAGNOSIS — N96 HISTORY OF MULTIPLE MISCARRIAGES: ICD-10-CM

## 2024-02-10 LAB
B-HCG SERPL-ACNC: 11 MIU/ML (ref 0–5)
PROGEST SERPL-MCNC: 5.26 NG/ML

## 2024-02-10 PROCEDURE — 84702 CHORIONIC GONADOTROPIN TEST: CPT

## 2024-02-10 PROCEDURE — 36415 COLL VENOUS BLD VENIPUNCTURE: CPT

## 2024-02-10 PROCEDURE — 84144 ASSAY OF PROGESTERONE: CPT

## 2024-02-12 ENCOUNTER — TELEPHONE (OUTPATIENT)
Dept: OBGYN CLINIC | Facility: MEDICAL CENTER | Age: 29
End: 2024-02-12

## 2024-02-12 NOTE — TELEPHONE ENCOUNTER
Phone call made to patient to further discuss results.     ----- Message from Paz Pierre sent at 2/12/2024 12:25 PM EST -----  Regarding: Test Results  Contact: [phone]  Hello, I received my test results and I am assuming I am no longer pregnant unfortunately.  I wasn't sure If I should go in for an ultrasound? Or if there are any other steps that should be taken, thanks

## 2024-02-13 ENCOUNTER — TELEPHONE (OUTPATIENT)
Dept: OBGYN CLINIC | Facility: CLINIC | Age: 29
End: 2024-02-13

## 2024-02-13 ENCOUNTER — TELEMEDICINE (OUTPATIENT)
Dept: OBGYN CLINIC | Facility: MEDICAL CENTER | Age: 29
End: 2024-02-13
Payer: COMMERCIAL

## 2024-02-13 DIAGNOSIS — N96 RECURRENT PREGNANCY LOSS: Primary | ICD-10-CM

## 2024-02-13 DIAGNOSIS — Z86.39 HISTORY OF VITAMIN D DEFICIENCY: ICD-10-CM

## 2024-02-13 DIAGNOSIS — R42 DIZZINESS: ICD-10-CM

## 2024-02-13 DIAGNOSIS — E88.819 INSULIN RESISTANCE: ICD-10-CM

## 2024-02-13 DIAGNOSIS — Z86.39 HISTORY OF NON ANEMIC VITAMIN B12 DEFICIENCY: ICD-10-CM

## 2024-02-13 DIAGNOSIS — R73.03 PREDIABETES: ICD-10-CM

## 2024-02-13 DIAGNOSIS — R41.0 MENTAL CONFUSION: ICD-10-CM

## 2024-02-13 PROCEDURE — 99214 OFFICE O/P EST MOD 30 MIN: CPT | Performed by: OBSTETRICS & GYNECOLOGY

## 2024-02-13 NOTE — PROGRESS NOTES
Virtual Regular Visit  Assessment/Plan:    Problem List Items Addressed This Visit          Other    Recurrent pregnancy loss - Primary    Relevant Orders    Chromosome analysis, peripheral blood    TSH, 3rd generation with Free T4 reflex    Hemoglobin A1C    Insulin, fasting    Ferritin    Vitamin B12 (Completed)    CBC and differential    Comprehensive metabolic panel    Vitamin D 25 hydroxy    Lupus anticoagulant    Cardiolipin antibody    B2 Glycoprotein I (IgG)Ab     Other Visit Diagnoses       Insulin resistance        Relevant Orders    TSH, 3rd generation with Free T4 reflex    Hemoglobin A1C    Insulin, fasting    CBC and differential    Comprehensive metabolic panel    Prediabetes        Relevant Orders    TSH, 3rd generation with Free T4 reflex    Hemoglobin A1C    Insulin, fasting    CBC and differential    Comprehensive metabolic panel    Dizziness        Relevant Orders    TSH, 3rd generation with Free T4 reflex    Ferritin    Vitamin B12 (Completed)    Vitamin D 25 hydroxy    Mental confusion        Relevant Orders    TSH, 3rd generation with Free T4 reflex    Hemoglobin A1C    Insulin, fasting    Ferritin    Vitamin B12 (Completed)    CBC and differential    Comprehensive metabolic panel    Vitamin D 25 hydroxy    History of vitamin D deficiency        Relevant Orders    CBC and differential    Comprehensive metabolic panel    Vitamin D 25 hydroxy    History of non anemic vitamin B12 deficiency        Relevant Orders    Ferritin    Vitamin B12 (Completed)    CBC and differential          - Testing as above  - Chromosomes ordered for pt and partner  - Vaginal progesterone from day 21 - menses (continued if +UPT)    __________________________________________________________________           Reason for visit is   Chief Complaint   Patient presents with    Virtual Regular Visit          Encounter provider Hilda Hardy MD    Provider located at [facility]  [facility]  [address]  [address]  [facility] [address]      Recent Visits  Date Type Provider Dept   24 Telephone Tami Short RN [facility]   24 Telephone Vee Pringle  [facility]   Showing recent visits within past 7 days and meeting all other requirements  Today's Visits  Date Type Provider Dept   24 Telemedicine Hilda Hardy MD [facility]   Showing today's visits and meeting all other requirements  Future Appointments  No visits were found meeting these conditions.  Showing future appointments within next 150 days and meeting all other requirements       The patient was identified by name and date of birth. Paz Pierre was informed that this is a telemedicine visit and that the visit is being conducted through the Epic Embedded platform. She agrees to proceed..  My office door was closed. No one else was in the room.  She acknowledged consent and understanding of privacy and security of the video platform. The patient has agreed to participate and understands they can discontinue the visit at any time.    Verification of patient location:    Patient is located at Home in the following state in which I hold an active license [place]    Patient is aware this is a billable service.     Subjective  Paz Pierre is a 28 y.o. female  presenting to discuss RPL.    Recent abnormal hcg testing prompting eval. 1st level appropriate, 2nd decreasing and with drop in progesterone. Patient notes she just started bleeding last night. Similar in flow and symptoms to a menses, cramping slightly worse than menstrual level for her typical but manageable. No dizziness, sob, palpitations. No other acute symptoms noted, however over last few weeks she has felt light headed, shaky, and reports decreased concentration. Reports concentration affects speech sometimes, presenting as word finding difficulty. Hx notable for  1x FT , followed by 2x SAB. Reports 1st was in April, was 8-10wks and hospitalized due to syncope at onset. 2nd occurred 3-4mo after, again spontaneous. Had been taking PO progesterone this time.     Reviewed with pt testing to date. Discussed it is often difficult to determine cause of RPL, and there is a normal rate of SAB within the average risk population. Discussed potential causes -- antiphospholipid syndrome, balanced translocations, uterine abnormalities (structural, endometrial), luteal phase defects, and maternal health concerns contributing. Advised on testing for same. Discussed chromosomal testing should occur on both male and female partner in this setting. She confirms paternity of all pregnancies are same and requests testing for her partner, Bubba Pierre 89. Discussed high first pass rate of PO progesterone, poor data to recommend presently (discussed difficulty confirming luteal phase defects with certainty), however minimal harm risk with this measure. Would consider vaginal approach in light of events this pregnancy. Questions answered to pt satisfaction.       HPI     Past Medical History:   Diagnosis Date    Anxiety     Asthma     as a child    Depression     Diet controlled gestational diabetes mellitus (GDM) in third trimester 3/3/2021    Miscarriage 2023    Recurrent pregnancy loss 2023    Urinary tract infection        Past Surgical History:   Procedure Laterality Date    [place]  DELIV[place] ONLY N/A 2021    Procedure:  SECTION ();  Surgeon: Kassandra Martinez MD;  Location: [facility];  Service: Obstetrics       Current Outpatient Medications   Medication Sig Dispense Refill    cyanocobalamin 1,000 mcg/mL Inject 1 mL (1,000 mcg total) into a muscle every 30 (thirty) days (Patient not taking: Reported on 2024) 3 mL 0    ergocalciferol (VITAMIN D2) 50,000 units Take 1 capsule (50,000 Units total) by mouth once a week 12 capsule 0    ibuprofen (MOTRIN) 600  mg tablet Take 1 tablet (600 mg total) by mouth every 6 (six) hours as needed for mild pain 30 tablet 0    metFORMIN (GLUCOPHAGE-XR) 500 mg 24 hr tablet Take 2 tablets (1,000 mg total) by mouth daily with dinner 60 tablet 11    miSOPROStol (Cytotec) 200 mcg tablet Place four tablets vaginal for miscarriage (Patient not taking: Reported on 10/18/2023) 4 tablet 0    Progesterone 200 MG CAPS Take 200 mg by mouth 2 (two) times a day Start 2-3 days after positive ovulation test. 60 capsule 0    sertraline (ZOLOFT) 100 mg tablet Take 1 tablet (100 mg total) by mouth daily 90 tablet 0    sertraline (Zoloft) 25 mg tablet Take 1 tablet (25 mg total) by mouth daily 90 tablet 0     Current Facility-Administered Medications   Medication Dose Route Frequency Provider Last Rate Last Admin    cyanocobalamin injection 1,000 mcg  1,000 mcg Intramuscular Q30 Days Denise Baca PA-C   1,000 mcg at 10/03/23 1505        No Known Allergies    Review of Systems   Constitutional:  Negative for chills and fever.   Respiratory:  Negative for shortness of breath.    Cardiovascular:  Negative for palpitations.   Gastrointestinal:  Negative for abdominal distention, abdominal pain, diarrhea, nausea and vomiting.   Genitourinary:  Positive for vaginal bleeding. Negative for dysuria, flank pain, genital sores, pelvic pain, vaginal discharge and vaginal pain.   Skin:  Negative for rash and wound.   Neurological:  Positive for tremors, speech difficulty and light-headedness. Negative for syncope, weakness and headaches.   Psychiatric/Behavioral:  Positive for decreased concentration. The patient is nervous/anxious.        Video Exam    There were no vitals filed for this visit.    Physical Exam  Constitutional:       General: She is not in acute distress.     Appearance: Normal appearance. She is well-developed. She is not ill-appearing, toxic-appearing or diaphoretic.   Eyes:      General: No scleral icterus.        Right eye: No discharge.          Left eye: No discharge.      Conjunctiva/sclera: Conjunctivae normal.   Pulmonary:      Effort: Pulmonary effort is normal. No respiratory distress.   Skin:     Coloration: Skin is not jaundiced or pale.      Findings: No bruising or rash.      Comments: Exam limited to exposed skin   Neurological:      Mental Status: She is alert.   Psychiatric:         Mood and Affect: Mood normal.         Behavior: Behavior normal.         Thought Content: Thought content normal.         Reviewed  Hcg/prog (2/7, 2/10)  Lupus anticoagulant, cardiolipin ab, b2 glycoprotein, lh, fsh, e2, t, AMH (10/19/23)  A1c, insulin, vit D, B12 (6/16/23)  Pelvic US (5/12/23)    Visit Time  Total Visit Duration: 16min

## 2024-02-13 NOTE — TELEPHONE ENCOUNTER
Patient had virtual visit this am & has additional questions re: symptoms & is inquiring about additional blood work.  Also has appointment with pcp 2/14/2/2024

## 2024-02-14 ENCOUNTER — OFFICE VISIT (OUTPATIENT)
Dept: FAMILY MEDICINE CLINIC | Facility: CLINIC | Age: 29
End: 2024-02-14
Payer: COMMERCIAL

## 2024-02-14 ENCOUNTER — APPOINTMENT (OUTPATIENT)
Dept: LAB | Facility: MEDICAL CENTER | Age: 29
End: 2024-02-14
Payer: COMMERCIAL

## 2024-02-14 VITALS
HEIGHT: 63 IN | RESPIRATION RATE: 16 BRPM | SYSTOLIC BLOOD PRESSURE: 134 MMHG | TEMPERATURE: 97.1 F | HEART RATE: 94 BPM | OXYGEN SATURATION: 97 % | BODY MASS INDEX: 51.03 KG/M2 | WEIGHT: 288 LBS | DIASTOLIC BLOOD PRESSURE: 86 MMHG

## 2024-02-14 DIAGNOSIS — R25.1 SHAKY: ICD-10-CM

## 2024-02-14 DIAGNOSIS — N96 HISTORY OF MULTIPLE MISCARRIAGES: ICD-10-CM

## 2024-02-14 DIAGNOSIS — Z86.39 HISTORY OF NON ANEMIC VITAMIN B12 DEFICIENCY: ICD-10-CM

## 2024-02-14 DIAGNOSIS — N91.2 AMENORRHEA: ICD-10-CM

## 2024-02-14 DIAGNOSIS — E53.8 VITAMIN B12 DEFICIENCY: Primary | ICD-10-CM

## 2024-02-14 DIAGNOSIS — N96 RECURRENT PREGNANCY LOSS: ICD-10-CM

## 2024-02-14 DIAGNOSIS — R42 DIZZINESS: ICD-10-CM

## 2024-02-14 DIAGNOSIS — R41.0 MENTAL CONFUSION: ICD-10-CM

## 2024-02-14 LAB
B-HCG SERPL-ACNC: 1 MIU/ML (ref 0–5)
PROGEST SERPL-MCNC: 0.11 NG/ML
VIT B12 SERPL-MCNC: 562 PG/ML (ref 180–914)

## 2024-02-14 PROCEDURE — 36415 COLL VENOUS BLD VENIPUNCTURE: CPT

## 2024-02-14 PROCEDURE — 84144 ASSAY OF PROGESTERONE: CPT

## 2024-02-14 PROCEDURE — 82607 VITAMIN B-12: CPT

## 2024-02-14 PROCEDURE — 84702 CHORIONIC GONADOTROPIN TEST: CPT

## 2024-02-14 PROCEDURE — 99213 OFFICE O/P EST LOW 20 MIN: CPT | Performed by: PHYSICIAN ASSISTANT

## 2024-02-14 RX ORDER — PROGESTERONE 200 MG/1
200 CAPSULE ORAL
Qty: 60 CAPSULE | Refills: 0 | Status: SHIPPED | OUTPATIENT
Start: 2024-02-14 | End: 2024-04-14

## 2024-02-14 NOTE — PROGRESS NOTES
"Name: Paz Pierre      : 1995      MRN: [id number]  Encounter Provider: Denise Baca PA-C  Encounter Date: 2024   Encounter department: [facility]    Assessment & Plan     1. Vitamin B12 deficiency  Assessment & Plan:  Suspected untreated deficiency as the cause of her symptoms. Pt to have B12 level check today, will likely restart weekly injections.      2. Shaky  Assessment & Plan:  Advised pt checks random fingerstick glucose when symptomatic. May need to eat more frequently or may need to eat snacks with a higher protein content. Advised to let me know if glucose <60.          Depression Screening and Follow-up Plan: Patient was screened for depression during today's encounter. They screened negative with a PHQ-9 score of 0.        Subjective      Paz is here today complaining of chronic symptoms (Worse since ) of feeling forgetful, \"out of it,\" mixing her words, feeling lightheaded. At times, reports feeling \"zoned out.\" Twice, reports seeing \"yellow sawant/flashes\".  Also notes that at times, feels shaky/lightheaded if she has not eaten in awhile. Pt with h/o gestational diabetes. She has a known history of Vit B12 deficiency, has not had supplementation since December, last level checked in 2023 and was on low normal end at 355.      Review of Systems   Constitutional:  Negative for chills and fever.   HENT:  Negative for ear pain and sore throat.    Eyes:  Positive for visual disturbance. Negative for pain.   Respiratory:  Negative for cough and shortness of breath.    Cardiovascular:  Negative for chest pain and palpitations.   Gastrointestinal:  Negative for abdominal pain and vomiting.   Genitourinary:  Negative for dysuria and hematuria.   Musculoskeletal:  Negative for arthralgias and back pain.   Skin:  Negative for color change and rash.   Neurological:  Positive for dizziness and light-headedness. Negative for seizures and syncope. " "  Psychiatric/Behavioral:  Positive for confusion and decreased concentration.    All other systems reviewed and are negative.      Current Outpatient Medications on File Prior to Visit   Medication Sig   • ergocalciferol (VITAMIN D2) 50,000 units Take 1 capsule (50,000 Units total) by mouth once a week   • metFORMIN (GLUCOPHAGE-XR) 500 mg 24 hr tablet Take 2 tablets (1,000 mg total) by mouth daily with dinner   • Progesterone 200 MG CAPS Take 200 mg by mouth 2 (two) times a day Start 2-3 days after positive ovulation test.   • sertraline (ZOLOFT) 100 mg tablet Take 1 tablet (100 mg total) by mouth daily   • sertraline (Zoloft) 25 mg tablet Take 1 tablet (25 mg total) by mouth daily       Objective     /86 (BP Location: Left arm, Patient Position: Sitting, Cuff Size: Adult)   Pulse 94   Temp (!) 97.1 °F (36.2 °C) (Temporal)   Resp 16   Ht 5' 3\" (1.6 m)   Wt 131 kg (288 lb)   LMP 01/08/2024 (Exact Date)   SpO2 97%   BMI 51.02 kg/m²     Physical Exam  Vitals reviewed.   Constitutional:       General: She is not in acute distress.     Appearance: She is well-developed. She is obese. She is not diaphoretic.   HENT:      Head: Normocephalic and atraumatic.      Right Ear: Hearing, tympanic membrane, ear canal and external ear normal.      Left Ear: Hearing, tympanic membrane, ear canal and external ear normal.      Nose: Nose normal.      Mouth/Throat:      Mouth: Mucous membranes are moist.      Pharynx: Oropharynx is clear. Uvula midline. No oropharyngeal exudate.   Eyes:      General: No scleral icterus.        Right eye: No discharge.         Left eye: No discharge.      Conjunctiva/sclera: Conjunctivae normal.   Neck:      Thyroid: No thyromegaly.      Vascular: No carotid bruit.   Cardiovascular:      Rate and Rhythm: Normal rate and regular rhythm.      Heart sounds: Normal heart sounds. No murmur heard.  Pulmonary:      Effort: Pulmonary effort is normal. No respiratory distress.      Breath sounds: " Normal breath sounds. No wheezing.   Abdominal:      General: Bowel sounds are normal. There is no distension.      Palpations: Abdomen is soft. There is no mass.      Tenderness: There is no abdominal tenderness. There is no guarding or rebound.   Musculoskeletal:         General: No tenderness. Normal range of motion.      Cervical back: Neck supple.   Lymphadenopathy:      Cervical: No cervical adenopathy.   Skin:     General: Skin is warm and dry.      Findings: No erythema or rash.   Neurological:      Mental Status: She is alert and oriented to person, place, and time.   Psychiatric:         Behavior: Behavior normal.         Thought Content: Thought content normal.         Judgment: Judgment normal.       Denise Baca PA-C

## 2024-02-14 NOTE — ASSESSMENT & PLAN NOTE
Advised pt checks random fingerstick glucose when symptomatic. May need to eat more frequently or may need to eat snacks with a higher protein content. Advised to let me know if glucose <60.

## 2024-02-14 NOTE — ASSESSMENT & PLAN NOTE
Suspected untreated deficiency as the cause of her symptoms. Pt to have B12 level check today, will likely restart weekly injections.

## 2024-02-19 ENCOUNTER — TELEPHONE (OUTPATIENT)
Dept: FAMILY MEDICINE CLINIC | Facility: CLINIC | Age: 29
End: 2024-02-19

## 2024-02-19 NOTE — TELEPHONE ENCOUNTER
Pt called stating that you wanted to review her B12 level. Pt asking if you can review labs that OBGYN put in. Please advise.

## 2024-02-20 DIAGNOSIS — E53.8 VITAMIN B12 DEFICIENCY: Primary | ICD-10-CM

## 2024-02-20 RX ORDER — CYANOCOBALAMIN 1000 UG/ML
1000 INJECTION, SOLUTION INTRAMUSCULAR; SUBCUTANEOUS WEEKLY
Status: SHIPPED | OUTPATIENT
Start: 2024-02-21 | End: 2024-05-15

## 2024-02-20 NOTE — TELEPHONE ENCOUNTER
B12 higher than expected however I still am suspicious that B12 is not high enough and she is having symptoms from this. Recommend getting B12 closer to 1000 and seeing how she feels, recommend weekly injections x 3 months and then repeat level. Does she want to come here for them or do them at home?

## 2024-02-21 ENCOUNTER — CLINICAL SUPPORT (OUTPATIENT)
Dept: FAMILY MEDICINE CLINIC | Facility: CLINIC | Age: 29
End: 2024-02-21
Payer: COMMERCIAL

## 2024-02-21 DIAGNOSIS — E53.8 VITAMIN B12 DEFICIENCY: Primary | ICD-10-CM

## 2024-02-21 PROCEDURE — 96372 THER/PROPH/DIAG INJ SC/IM: CPT | Performed by: PHYSICIAN ASSISTANT

## 2024-02-21 RX ADMIN — CYANOCOBALAMIN 1000 MCG: 1000 INJECTION, SOLUTION INTRAMUSCULAR; SUBCUTANEOUS at 10:21

## 2024-02-23 ENCOUNTER — TELEPHONE (OUTPATIENT)
Dept: FAMILY MEDICINE CLINIC | Facility: CLINIC | Age: 29
End: 2024-02-23

## 2024-02-23 NOTE — TELEPHONE ENCOUNTER
----- Message from Tanisha Johnson sent at 2/22/2024  3:53 PM EST -----  Regarding: FW: B12/POTS  Contact: [phone]    ----- Message -----  From: Paz Pierre  Sent: 2/22/2024   3:46 PM EST  To: [facility]  Subject: B12/POTS                                         Hello, I was wondering if you thought POTS might be a possibility since my B12 is in a good range. My friend has it and it sounded like it could be something I have.  I just wanted to mention it, thanks

## 2024-02-27 ENCOUNTER — TELEPHONE (OUTPATIENT)
Dept: OBGYN CLINIC | Facility: CLINIC | Age: 29
End: 2024-02-27

## 2024-02-27 NOTE — TELEPHONE ENCOUNTER
In reference to the previous telephone encounter about Prior Authorization for lab work:    Representative: Justine     Reference # [id number] and #[id number]

## 2024-02-27 NOTE — TELEPHONE ENCOUNTER
Called patient's insurance to see if a Prior Authorization is needed for the following lab work: eripheral chromosomes, lupus anticoagulant, cardiolipin, and b2 glycoproteins, and chromosome analysis for partner (same insurance plan).    Per representative, no PA is needed for labwork under this plan. Patient is however subject to a $2000 deductible and as of this time she has met $137.93 of this amount. Patient will be covered at 100% for the labwork when she has met the current deductible meaning patient and partner will currently not be 100% covered for labs.     Left voice mail for patient to be made aware.

## 2024-02-27 NOTE — TELEPHONE ENCOUNTER
Patient called following up on voice mail received. Reviewed Lulu's note with patient. Patient aware can access note in [facility]. Patient verbalized understanding. Patient aware to call back with any questions or concerns.

## 2024-02-28 ENCOUNTER — TELEPHONE (OUTPATIENT)
Age: 29
End: 2024-02-28

## 2024-02-28 ENCOUNTER — CLINICAL SUPPORT (OUTPATIENT)
Dept: FAMILY MEDICINE CLINIC | Facility: CLINIC | Age: 29
End: 2024-02-28
Payer: COMMERCIAL

## 2024-02-28 DIAGNOSIS — E53.8 VITAMIN B12 DEFICIENCY: Primary | ICD-10-CM

## 2024-02-28 PROCEDURE — 96372 THER/PROPH/DIAG INJ SC/IM: CPT | Performed by: PHYSICIAN ASSISTANT

## 2024-02-28 RX ADMIN — CYANOCOBALAMIN 1000 MCG: 1000 INJECTION, SOLUTION INTRAMUSCULAR; SUBCUTANEOUS at 09:57

## 2024-02-28 NOTE — TELEPHONE ENCOUNTER
Pt calling stating has some blood work order that she does not believe will be covered by insurance. Asking if RN is aware of self pay cost. RN stated pt should call insurance or [facility]'s Billing office. Provided [facility] Billing phone number. Pt agreeable to plan.

## 2024-03-01 ENCOUNTER — TELEPHONE (OUTPATIENT)
Dept: FAMILY MEDICINE CLINIC | Facility: CLINIC | Age: 29
End: 2024-03-01

## 2024-03-01 DIAGNOSIS — R00.0 TACHYCARDIA: Primary | ICD-10-CM

## 2024-03-01 NOTE — TELEPHONE ENCOUNTER
I would like to order a holter monitor for better evaluation, it would be more reliable than an apple watch. Will place order.

## 2024-03-01 NOTE — TELEPHONE ENCOUNTER
----- Message from Genna Garcia sent at 3/1/2024 12:38 PM EST -----  Regarding: FW: Reply  Contact: [phone]    ----- Message -----  From: Paz Pierre  Sent: 3/1/2024  12:35 PM EST  To: [facility]  Subject: Reply                                            I’ve been monitoring my heart rate with my Apple Watch and these are the results.

## 2024-03-06 ENCOUNTER — CLINICAL SUPPORT (OUTPATIENT)
Dept: FAMILY MEDICINE CLINIC | Facility: CLINIC | Age: 29
End: 2024-03-06
Payer: COMMERCIAL

## 2024-03-06 DIAGNOSIS — E53.8 VITAMIN B12 DEFICIENCY: Primary | ICD-10-CM

## 2024-03-06 PROCEDURE — 96372 THER/PROPH/DIAG INJ SC/IM: CPT | Performed by: PHYSICIAN ASSISTANT

## 2024-03-06 RX ADMIN — CYANOCOBALAMIN 1000 MCG: 1000 INJECTION, SOLUTION INTRAMUSCULAR; SUBCUTANEOUS at 10:04

## 2024-03-07 ENCOUNTER — PATIENT MESSAGE (OUTPATIENT)
Dept: OBGYN CLINIC | Facility: MEDICAL CENTER | Age: 29
End: 2024-03-07

## 2024-03-07 ENCOUNTER — TELEPHONE (OUTPATIENT)
Dept: FAMILY MEDICINE CLINIC | Facility: CLINIC | Age: 29
End: 2024-03-07

## 2024-03-07 DIAGNOSIS — E66.01 CLASS 3 SEVERE OBESITY WITH BODY MASS INDEX (BMI) OF 50.0 TO 59.9 IN ADULT, UNSPECIFIED OBESITY TYPE, UNSPECIFIED WHETHER SERIOUS COMORBIDITY PRESENT (HCC): Primary | ICD-10-CM

## 2024-03-07 RX ORDER — TIRZEPATIDE 2.5 MG/.5ML
2.5 INJECTION, SOLUTION SUBCUTANEOUS WEEKLY
Qty: 2 ML | Refills: 0 | Status: SHIPPED | OUTPATIENT
Start: 2024-03-07 | End: 2024-03-11 | Stop reason: SDUPTHER

## 2024-03-11 ENCOUNTER — HOSPITAL ENCOUNTER (OUTPATIENT)
Dept: NON INVASIVE DIAGNOSTICS | Facility: HOSPITAL | Age: 29
Discharge: HOME | End: 2024-03-11
Payer: COMMERCIAL

## 2024-03-11 ENCOUNTER — OFFICE VISIT (OUTPATIENT)
Dept: BARIATRICS | Facility: CLINIC | Age: 29
End: 2024-03-11
Payer: COMMERCIAL

## 2024-03-11 VITALS
WEIGHT: 287 LBS | HEIGHT: 63 IN | TEMPERATURE: 98.4 F | HEART RATE: 74 BPM | SYSTOLIC BLOOD PRESSURE: 160 MMHG | RESPIRATION RATE: 18 BRPM | OXYGEN SATURATION: 98 % | BODY MASS INDEX: 50.85 KG/M2 | DIASTOLIC BLOOD PRESSURE: 80 MMHG

## 2024-03-11 DIAGNOSIS — R73.03 PREDIABETES: ICD-10-CM

## 2024-03-11 DIAGNOSIS — E66.01 CLASS 3 OBESITY (HCC): Primary | ICD-10-CM

## 2024-03-11 DIAGNOSIS — R00.0 TACHYCARDIA: ICD-10-CM

## 2024-03-11 PROCEDURE — 93225 XTRNL ECG REC<48 HRS REC: CPT

## 2024-03-11 PROCEDURE — 93226 XTRNL ECG REC<48 HR SCAN A/R: CPT

## 2024-03-11 PROCEDURE — 99215 OFFICE O/P EST HI 40 MIN: CPT | Performed by: PHYSICIAN ASSISTANT

## 2024-03-11 RX ORDER — TIRZEPATIDE 2.5 MG/.5ML
2.5 INJECTION, SOLUTION SUBCUTANEOUS WEEKLY
Qty: 2 ML | Refills: 0 | Status: SHIPPED | OUTPATIENT
Start: 2024-03-11 | End: 2024-04-08

## 2024-03-11 NOTE — PATIENT COMMUNICATION
Patient called in asking when she should get her blood work completed as she did not have her period yet since having a miscarriage.  Should she wait until she gets her period?  Please advise and call patient back with outcomes.  Thank you.

## 2024-03-11 NOTE — PROGRESS NOTES
Assessment/Plan:    Class 3 obesity (HCC)  -Patient is pursuing Conservative Program  -Initial weight loss goal of 5-10% weight loss for improved health  -Screening labs  -Birth control: consistent condom use as she is avoiding pregnancy; Would like to TTC in 2025  -being worked up from multiple miscarriage  -being worked up for POTS- caution Topamax and phentermine  -Can consider increase of metformin  -Will try for zepbound   -Patient denies personal and family history of MEN2 tumors and medullary thyroid/thyroid carcinoma. Patient denies personal history of pancreatitis.   -Does have time constraints so deciding between optavia, ND partial meal replacement with RD, or protein shake + fruit for 2 meals + low silvia dinner. She will reach out with what she decides.     Initial: 271 (6/5/23)  Current: 287  Change: +16   Goal: 200    Follow up in approximately  4 months  with Non-Surgical Physician/Advanced Practitioner.    Goals:  Food log (ie.) www.TheDressSpot.com.com,sparkpeople.com,loseit.com,calorieking.com,etc. baritastic  No sugary beverages. At least 64oz of water daily.  Increase physical activity by 10 minutes daily. Gradually increase physical activity to a goal of 5 days per week for 30 minutes of MODERATE intensity PLUS 2 days per week of FULL BODY resistance training  5-10 servings of fruits and vegetables per day and 25-35 grams of dietary fiber per day, gradually increasing     Diagnoses and all orders for this visit:    Class 3 obesity (HCC)  -     tirzepatide (Zepbound) 2.5 mg/0.5 mL auto-injector; Inject 0.5 mL (2.5 mg total) under the skin once a week for 28 days    Prediabetes  Comments:  -on metformin    Body mass index 50.0-59.9, adult (HCC)        Subjective:   Chief Complaint   Patient presents with    Follow-up     Follow up     Patient ID: Paz Pierre  is a 28 y.o. female with excess weight/obesity here to pursue weight management.  Patient is pursuing Conservative Program.     HPI  The  "patient presents for [facility] follow up.     Last seen 06/2023- had multiple family and health stressors. Also had multiple miscarriages. She is avoiding pregnancy as she is being worked up for the multiple miscarriages. She goodman snot plan to TTC until 2025. Would like to focus on improved nutrition, weight loss, and feeling better.   Started metformin sometime in the summer for prediabetes     Worsening back pain     Being worked up for POTS due to fatigue, brain fog and lightheadedness. Currently wearing holter as she has had palpitations.     The following portions of the patient's history were reviewed and updated as appropriate: allergies, current medications, past family history, past medical history, past social history, past surgical history, and problem list.    Review of Systems   Cardiovascular:  Positive for palpitations.   Neurological:  Negative for dizziness and light-headedness.   Psychiatric/Behavioral: Negative.       Objective:    /80 (BP Location: Right arm, Patient Position: Sitting, Cuff Size: Large)   Pulse 74   Temp 98.4 °F (36.9 °C)   Resp 18   Ht 5' 3\" (1.6 m)   Wt 130 kg (287 lb)   SpO2 98%   BMI 50.84 kg/m²      Physical Exam  Vitals and nursing note reviewed.     Constitutional   General appearance: Abnormal.  well developed obese.   Pulmonary   Respiratory effort: No increased work of breathing or signs of respiratory distress.   Abdomen   Abdomen: Abnormal.  The abdomen was obese.   Gait and station: Normal.    Psychiatric   Orientation to person, place and time: Normal.    Affect: appropriate    40 minute visit, >50% time spent counseling patient on diet behavior and exercise modification for weight loss.    "

## 2024-03-11 NOTE — ASSESSMENT & PLAN NOTE
-Patient is pursuing Conservative Program  -Initial weight loss goal of 5-10% weight loss for improved health  -Screening labs  -Birth control: consistent condom use as she is avoiding pregnancy; Would like to TTC in 2025  -being worked up from multiple miscarriage  -being worked up for POTS- caution Topamax and phentermine  -Can consider increase of metformin  -Will try for zepbound   -Patient denies personal and family history of MEN2 tumors and medullary thyroid/thyroid carcinoma. Patient denies personal history of pancreatitis.   -Does have time constraints so deciding between optavia, ND partial meal replacement with RD, or protein shake + fruit for 2 meals + low silvia dinner. She will reach out with what she decides.     Initial: 271 (6/5/23)  Current: 287  Change: +16   Goal: 200

## 2024-03-13 ENCOUNTER — RA CDI HCC (OUTPATIENT)
Dept: OTHER | Facility: HOSPITAL | Age: 29
End: 2024-03-13

## 2024-03-13 ENCOUNTER — CLINICAL SUPPORT (OUTPATIENT)
Dept: FAMILY MEDICINE CLINIC | Facility: CLINIC | Age: 29
End: 2024-03-13
Payer: COMMERCIAL

## 2024-03-13 DIAGNOSIS — E53.8 VITAMIN B12 DEFICIENCY: Primary | ICD-10-CM

## 2024-03-13 PROCEDURE — 96375 TX/PRO/DX INJ NEW DRUG ADDON: CPT | Performed by: PHYSICIAN ASSISTANT

## 2024-03-13 RX ADMIN — CYANOCOBALAMIN 1000 MCG: 1000 INJECTION, SOLUTION INTRAMUSCULAR; SUBCUTANEOUS at 10:14

## 2024-03-13 NOTE — PROGRESS NOTES
HCC coding opportunities          Chart Reviewed number of suggestions sent to Provider: 2   E66.01  Depression    Patients Insurance        Commercial Insurance: [employer]

## 2024-03-15 PROCEDURE — 93227 XTRNL ECG REC<48 HR R&I: CPT | Performed by: INTERNAL MEDICINE

## 2024-03-19 DIAGNOSIS — R00.0 SINUS TACHYCARDIA: Primary | ICD-10-CM

## 2024-03-20 ENCOUNTER — HOSPITAL ENCOUNTER (EMERGENCY)
Facility: HOSPITAL | Age: 29
Discharge: HOME | End: 2024-03-20
Attending: EMERGENCY MEDICINE
Payer: COMMERCIAL

## 2024-03-20 ENCOUNTER — CLINICAL SUPPORT (OUTPATIENT)
Dept: FAMILY MEDICINE CLINIC | Facility: CLINIC | Age: 29
End: 2024-03-20
Payer: COMMERCIAL

## 2024-03-20 VITALS
BODY MASS INDEX: 51.55 KG/M2 | RESPIRATION RATE: 18 BRPM | DIASTOLIC BLOOD PRESSURE: 98 MMHG | OXYGEN SATURATION: 96 % | HEART RATE: 82 BPM | TEMPERATURE: 97.8 F | WEIGHT: 291.01 LBS | SYSTOLIC BLOOD PRESSURE: 139 MMHG

## 2024-03-20 DIAGNOSIS — E55.9 VITAMIN D DEFICIENCY: Primary | ICD-10-CM

## 2024-03-20 DIAGNOSIS — G89.29 ACUTE EXACERBATION OF CHRONIC LOW BACK PAIN: Primary | ICD-10-CM

## 2024-03-20 DIAGNOSIS — M62.830 LUMBAR PARASPINAL MUSCLE SPASM: ICD-10-CM

## 2024-03-20 DIAGNOSIS — M54.50 ACUTE EXACERBATION OF CHRONIC LOW BACK PAIN: Primary | ICD-10-CM

## 2024-03-20 PROCEDURE — 96372 THER/PROPH/DIAG INJ SC/IM: CPT | Performed by: PHYSICIAN ASSISTANT

## 2024-03-20 PROCEDURE — 99284 EMERGENCY DEPT VISIT MOD MDM: CPT | Performed by: EMERGENCY MEDICINE

## 2024-03-20 PROCEDURE — 99282 EMERGENCY DEPT VISIT SF MDM: CPT

## 2024-03-20 RX ORDER — LIDOCAINE 50 MG/G
1 PATCH TOPICAL ONCE
Status: DISCONTINUED | OUTPATIENT
Start: 2024-03-20 | End: 2024-03-20 | Stop reason: HOSPADM

## 2024-03-20 RX ORDER — IBUPROFEN 600 MG/1
600 TABLET ORAL ONCE
Status: COMPLETED | OUTPATIENT
Start: 2024-03-20 | End: 2024-03-20

## 2024-03-20 RX ADMIN — CYANOCOBALAMIN 1000 MCG: 1000 INJECTION, SOLUTION INTRAMUSCULAR; SUBCUTANEOUS at 10:29

## 2024-03-20 RX ADMIN — IBUPROFEN 600 MG: 600 TABLET, FILM COATED ORAL at 10:51

## 2024-03-20 RX ADMIN — LIDOCAINE 5% 1 PATCH: 700 PATCH TOPICAL at 10:51

## 2024-03-20 NOTE — DISCHARGE INSTRUCTIONS
Please take 600mg ibuprofen(Motrin) with food every 8 hours as needed for pain.  You may also take 650mg acetaminophen(Tylenol) every 4 hours as needed for pain.  Please apply one over-the-counter lidocaine patch(active ingredient: 4% lidocaine) to your low back daily, leave in place for 12 hours, and remove and leave off for 12 hours before applying another.  You may find relief with application of heat or ice for up to 20 minutes at a time.  I highly recommend that you follow up with the comprehensive spine program for evaluation.

## 2024-03-20 NOTE — Clinical Note
Paz Pierre was seen and treated in our emergency department on 3/20/2024.                Diagnosis:     Paz  .    She may return on this date: 03/21/2024         If you have any questions or concerns, please don't hesitate to call.      Heidy Rendon, DO    ______________________________           _______________          _______________  Hospital Representative                              Date                                Time

## 2024-03-20 NOTE — ED ATTENDING ATTESTATION
3/20/2024  I, Gagan Garcia DO, saw and evaluated the patient. I have discussed the patient with the resident/non-physician practitioner and agree with the resident's/non-physician practitioner's findings, Plan of Care, and MDM as documented in the resident's/non-physician practitioner's note, except where noted. All available labs and Radiology studies were reviewed.  I was present for key portions of any procedure(s) performed by the resident/non-physician practitioner and I was immediately available to provide assistance.       At this point I agree with the current assessment done in the Emergency Department.  I have conducted an independent evaluation of this patient a history and physical is as follows:    This is a 28-year-old female past medical history significant for prior pregnancy, obesity, intermittent/chronic low back pain, history of remote coccyx injury, presented to the ER for evaluation of generalized lumbar low back pain.    Has about 1 week history of gradual onset atraumatic generalized lumbar back pain.  Pain is equal across the low back.  Does not radiate.  Is not associate with any other symptoms such as numbness tingling weakness fevers chills history of cancer IVDA, saddle anesthesia, bowel or bladder dysfunction, motor weakness, sensory deficits, nausea, abdominal pain, vomiting.  Reports similar to prior episodes.  Thinks the inciting causes prolonged sitting at her job where she works at the [employer].  Reports in the past similar job had brought about the symptoms.  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  Tried to stand and is not pursuing finding another job.  Symptoms are mild, persistent.  Worse with sitting, bending twisting.  Taking nothing for symptoms.    Review of chart reveals prior imaging in 2018 of the lumbar spine without any structural/congenital  abnormalities.    Review of Systems   Constitutional:  Positive for activity change. Negative for appetite change and fever.   Cardiovascular:  Negative for chest pain.   Gastrointestinal:  Negative for abdominal pain, nausea and vomiting.   Genitourinary:  Negative for decreased urine volume, difficulty urinating, dysuria, flank pain, hematuria and pelvic pain.   Musculoskeletal:  Positive for back pain. Negative for arthralgias, gait problem, joint swelling, myalgias and neck pain.   Skin:  Negative for rash.   Neurological:  Negative for weakness and numbness.     Physical Exam  Vitals and nursing note reviewed.   Constitutional:       General: She is not in acute distress.     Appearance: Normal appearance. She is obese.   HENT:      Head: Atraumatic.      Right Ear: External ear normal.      Left Ear: External ear normal.      Nose: Nose normal.   Eyes:      Conjunctiva/sclera: Conjunctivae normal.   Cardiovascular:      Rate and Rhythm: Normal rate and regular rhythm.   Pulmonary:      Effort: Pulmonary effort is normal. No respiratory distress.   Abdominal:      General: Abdomen is flat. There is no distension.   Musculoskeletal:      Cervical back: Normal and neck supple.      Thoracic back: Normal.      Lumbar back: Spasms and tenderness present. No swelling, deformity or bony tenderness.   Skin:     General: Skin is warm and dry.      Capillary Refill: Capillary refill takes less than 2 seconds.   Neurological:      General: No focal deficit present.      Mental Status: She is alert.      Sensory: No sensory deficit.      Motor: No weakness.      Gait: Gait normal.      Deep Tendon Reflexes: Reflexes normal.       Assessment/plan:  28-year-old female presenting with 1 week history of atraumatic generalized low back pain with a history of similar exacerbations in the past which she attributes to prolonged sitting at her job.  She has no associated neurologic deficits no history of trauma no red flags for  other etiology of her back pain such as spinal cord compression or infection.  Reviewed prior imaging no evidence of structural/congenital abnormality.  No indication for repeat imaging today.  No medications taken at home we will treat here symptomatically provide medications upon discharge and referral to physical therapy.  Return to ER precautions discussed for new/worsening symptoms.  Patient verbalized understanding agreement with plan will discharge.    ED Course         Critical Care Time  Procedures

## 2024-03-21 ENCOUNTER — NURSE TRIAGE (OUTPATIENT)
Dept: PHYSICAL THERAPY | Facility: OTHER | Age: 29
End: 2024-03-21

## 2024-03-21 DIAGNOSIS — G89.29 ACUTE EXACERBATION OF CHRONIC LOW BACK PAIN: Primary | ICD-10-CM

## 2024-03-21 DIAGNOSIS — M54.50 ACUTE EXACERBATION OF CHRONIC LOW BACK PAIN: Primary | ICD-10-CM

## 2024-03-21 NOTE — TELEPHONE ENCOUNTER
Additional Information   Negative: Is this related to a work injury?   Negative: Is this related to an MVA?   Negative: Are you currently recieving homecare services?   Negative: Has the patient had unexplained weight loss?   Negative: Does the patient have a fever?   Negative: Is the patient experiencing acute drop foot or paralysis?   Negative: Is the patient experiencing urine retention?   Negative: Has the patient experienced major trauma? (fall from height, high speed collision, direct blow to spine) and is also experiencing nausea, light-headedness, or loss of consciousness?   Negative: Is the patient experiencing blood in sputum?   Negative: Is this a chronic condition?    Background - Initial Assessment  Clinical complaint: exacerbation of chronic bilateral low back pain. Non radiating. No numbness or tingling.NKI.  Date of onset: 2 weeks  Frequency of pain: constant  Quality of pain: aching    Protocols used: [facility] Protocol    Comprehensive [facility] Program was reviewed in detail and what we can provide for their back pain.  Patient is agreeable to being triaged and would like to proceed with Physical Therapy.    Referral was placed for Physical Therapy at the [facility] site. Patients information was sent to the  to make evaluation appointment. Patient made aware that the PT office  will be calling to schedule the appointment.  Patient was provided with the phone number to the PT office.    No further questions and/or concerns were voiced by the patient at this time. Patient states understanding of the referral that was placed.    Referral Closed.

## 2024-03-24 NOTE — ED PROVIDER NOTES
History  Chief Complaint   Patient presents with    Back Pain     Pt c/o lower back pain for 1 week. Denies any injury     Paz is a 28 yof who presents with bilateral low back pain.  States that one week ago, started a new job where she sits all day, has onset of bilateral low back spasming pain which is non-radiating, is worse with movement and sitting, and is moderate in severity.  Has not taken any OTC medications and has not tried application of heat or ice, has not attempted stretching or any other supportive care measures.  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  Had lumbar spine x-rays during that previous flare which were consistent with spasm but did not show any other abnormal findings. Also has history of sacral fracture in 2021, however states that pain had completely resolved and is not similar to what she is feeling today.  Denies fever, chills, weakness, numbness, midline back pain, dysuria, hematuria, flank pain, or rash.          Prior to Admission Medications   Prescriptions Last Dose Informant Patient Reported? Taking?   Progesterone 200 MG CAPS 3/19/2024 Self No Yes   Sig: Insert 200 mg into the vagina daily at bedtime From cycle day 21 until menses. Continue unchanged and call MD if positive pregnancy test.   ergocalciferol (VITAMIN D2) 50,000 units Past Month Self No Yes   Sig: Take 1 capsule (50,000 Units total) by mouth once a week   metFORMIN (GLUCOPHAGE-XR) 500 mg 24 hr tablet 3/19/2024 Self No Yes   Sig: Take 2 tablets (1,000 mg total) by mouth daily with dinner   sertraline (ZOLOFT) 100 mg tablet 3/19/2024 Self No Yes   Sig: Take 1 tablet (100 mg total) by mouth daily   sertraline (Zoloft) 25 mg tablet 3/19/2024 Self No Yes   Sig: Take 1 tablet (25 mg total) by mouth daily   tirzepatide (Zepbound) 2.5 mg/0.5 mL auto-injector   No No   Sig: Inject 0.5 mL (2.5 mg total) under the  skin once a week for 28 days      Facility-Administered Medications Last Administration Doses Remaining   cyanocobalamin injection 1,000 mcg 3/20/2024 10:29 AM    cyanocobalamin injection 1,000 mcg 3/13/2024 10:14 AM 10          Past Medical History:   Diagnosis Date    Anxiety     Asthma     as a child    Depression     Diet controlled gestational diabetes mellitus (GDM) in third trimester 3/3/2021    Miscarriage 2023    Recurrent pregnancy loss 2023    Urinary tract infection        Past Surgical History:   Procedure Laterality Date    [place]  DELIVERY ONLY N/A 2021    Procedure:  SECTION ();  Surgeon: Kassandra Martinez MD;  Location: [facility];  Service: Obstetrics       Family History   Problem Relation Age of Onset    Asthma Mother     Diabetes Maternal Grandmother     Hypertension Maternal Grandmother     Thyroid disease Maternal Grandmother     Breast cancer Neg Hx     Colon cancer Neg Hx     Ovarian cancer Neg Hx      I have reviewed and agree with the history as documented.    E-Cigarette/Vaping    E-Cigarette Use Never User      E-Cigarette/Vaping Substances    Nicotine No     THC No     CBD No     Flavoring No     Other No     Unknown No      Social History     Tobacco Use    Smoking status: Never    Smokeless tobacco: Never   Vaping Use    Vaping status: Never Used   Substance Use Topics    Alcohol use: Not Currently    Drug use: Never        Review of Systems   Constitutional: Negative.    HENT: Negative.     Eyes: Negative.    Respiratory: Negative.     Cardiovascular: Negative.    Gastrointestinal: Negative.  Negative for abdominal pain, nausea and vomiting.   Endocrine: Negative.    Genitourinary: Negative.  Negative for decreased urine volume, difficulty urinating, dysuria, flank pain, hematuria and pelvic pain.   Musculoskeletal:  Positive for back pain. Negative for gait problem, neck pain and neck stiffness.   Skin: Negative.  Negative for pallor and rash.    Allergic/Immunologic: Negative.    Neurological: Negative.  Negative for weakness and numbness.   Hematological: Negative.    Psychiatric/Behavioral: Negative.     All other systems reviewed and are negative.      Physical Exam  ED Triage Vitals [03/20/24 1013]   Temperature Pulse Respirations Blood Pressure SpO2   97.8 °F (36.6 °C) 82 18 139/98 96 %      Temp Source Heart Rate Source Patient Position - Orthostatic VS BP Location FiO2 (%)   Temporal Monitor Sitting Left arm --      Pain Score       6             Orthostatic Vital Signs  Vitals:    03/20/24 1013   BP: 139/98   Pulse: 82   Patient Position - Orthostatic VS: Sitting       Physical Exam  Vitals and nursing note reviewed. Exam conducted with a chaperone present.   Constitutional:       General: She is not in acute distress.     Appearance: Normal appearance.   HENT:      Head: Normocephalic.   Eyes:      Extraocular Movements: Extraocular movements intact.   Cardiovascular:      Rate and Rhythm: Normal rate and regular rhythm.      Pulses: Normal pulses.      Heart sounds: Normal heart sounds.   Pulmonary:      Effort: Pulmonary effort is normal. No respiratory distress.      Breath sounds: Normal breath sounds.   Abdominal:      General: Abdomen is flat. Bowel sounds are normal. There is no distension.      Palpations: Abdomen is soft.      Tenderness: There is no abdominal tenderness. There is no right CVA tenderness, left CVA tenderness, guarding or rebound.   Musculoskeletal:      Cervical back: Normal range of motion and neck supple. No tenderness.      Thoracic back: Normal.      Lumbar back: Spasms and tenderness present. No swelling, deformity or bony tenderness. Decreased range of motion. Negative right straight leg raise test and negative left straight leg raise test.      Right hip: Normal.      Left hip: Normal.      Comments: Bilateral lumbar paravertebral hypertonicity without overlying skin changes or underlying crepitus or deformity.   Lumbar range of motion limited in all terminal planes secondary to pain.  Bilateral lower extremities are neurovascularly intact.   Lymphadenopathy:      Cervical: No cervical adenopathy.   Skin:     General: Skin is warm and dry.   Neurological:      General: No focal deficit present.      Mental Status: She is alert and oriented to person, place, and time.      Sensory: No sensory deficit.      Motor: No weakness.      Gait: Gait normal.      Deep Tendon Reflexes: Reflexes normal.   Psychiatric:         Mood and Affect: Mood normal.         Behavior: Behavior normal.         ED Medications  Medications   ibuprofen (MOTRIN) tablet 600 mg (600 mg Oral Given 3/20/24 1051)       Diagnostic Studies  Results Reviewed       None                   No orders to display         Procedures  Procedures      ED Course                             SBIRT 22yo+      Flowsheet Row Most Recent Value   Initial Alcohol Screen: US AUDIT-C     1. How often do you have a drink containing alcohol? 0 Filed at: 03/20/2024 1012   2. How many drinks containing alcohol do you have on a typical day you are drinking?  0 Filed at: 03/20/2024 1012   3a. Male UNDER 65: How often do you have five or more drinks on one occasion? 0 Filed at: 03/20/2024 1012   3b. FEMALE Any Age, or MALE 65+: How often do you have 4 or more drinks on one occassion? 0 Filed at: 03/20/2024 1012   Audit-C Score 0 Filed at: 03/20/2024 1012   ZULEIKA: How many times in the past year have you...    Used an illegal drug or used a prescription medication for non-medical reasons? Never Filed at: 03/20/2024 1012                  Medical Decision Making  Patient presents with bilateral low back spasm.  Denies neurologic symptoms or radiation of pain.  Vital signs within normal limits.  Physical exam benign.  No indication for labs or imaging at this time.  Will give ibuprofen, discharge.  Supportive care measures, OTC pain medication regimen, follow-up, and return precautions  discussed.  Will refer to comprehensive spine.    Amount and/or Complexity of Data Reviewed  External Data Reviewed: labs, radiology and notes.    Risk  Prescription drug management.          Disposition  Final diagnoses:   Acute exacerbation of chronic low back pain   Lumbar paraspinal muscle spasm     Time reflects when diagnosis was documented in both MDM as applicable and the Disposition within this note       Time User Action Codes Description Comment    3/20/2024 11:01 AM Heidy Rendon [M54.50,  G89.29] Acute exacerbation of chronic low back pain     3/20/2024 11:02 AM Heidy Rendon [M62.830] Lumbar paraspinal muscle spasm           ED Disposition       ED Disposition   Discharge    Condition   Stable    Date/Time   Wed Mar 20, 2024 1102    Comment   Paz Pierre discharge to home/self care.                   Follow-up Information       Follow up With Specialties Details Why Contact Info Additional Information    Denise Baca PA-C Physician Assistant   [address] [facility] [address]  [phone]       [facility] Physical Therapy Schedule an appointment as soon as possible for a visit   [phone]            Discharge Medication List as of 3/20/2024 11:07 AM        CONTINUE these medications which have NOT CHANGED    Details   ergocalciferol (VITAMIN D2) 50,000 units Take 1 capsule (50,000 Units total) by mouth once a week, Starting Thu 2/8/2024, Normal      metFORMIN (GLUCOPHAGE-XR) 500 mg 24 hr tablet Take 2 tablets (1,000 mg total) by mouth daily with dinner, Starting Tue 11/14/2023, Until Wed 11/13/2024, Normal      Progesterone 200 MG CAPS Insert 200 mg into the vagina daily at bedtime From cycle day 21 until menses. Continue unchanged and call MD if positive pregnancy test., Starting Wed 2/14/2024, Until Sun 4/14/2024, Normal      !! sertraline (ZOLOFT) 100 mg tablet Take 1 tablet (100 mg total) by mouth daily, Starting Thu 1/11/2024,  Normal      !! sertraline (Zoloft) 25 mg tablet Take 1 tablet (25 mg total) by mouth daily, Starting Thu 1/11/2024, Normal      tirzepatide (Zepbound) 2.5 mg/0.5 mL auto-injector Inject 0.5 mL (2.5 mg total) under the skin once a week for 28 days, Starting Mon 3/11/2024, Until Mon 4/8/2024, Normal       !! - Potential duplicate medications found. Please discuss with provider.            PDMP Review       None             ED Provider  Attending physically available and evaluated Paz Magallon Ignacio. I managed the patient along with the ED Attending.    Electronically Signed by           Heidy Rendon DO  03/24/24 6118

## 2024-03-27 ENCOUNTER — OFFICE VISIT (OUTPATIENT)
Dept: FAMILY MEDICINE CLINIC | Facility: CLINIC | Age: 29
End: 2024-03-27
Payer: COMMERCIAL

## 2024-03-27 ENCOUNTER — TELEPHONE (OUTPATIENT)
Age: 29
End: 2024-03-27

## 2024-03-27 VITALS
HEIGHT: 63 IN | OXYGEN SATURATION: 96 % | HEART RATE: 90 BPM | SYSTOLIC BLOOD PRESSURE: 130 MMHG | BODY MASS INDEX: 50.85 KG/M2 | WEIGHT: 287 LBS | TEMPERATURE: 97.4 F | DIASTOLIC BLOOD PRESSURE: 80 MMHG | RESPIRATION RATE: 16 BRPM

## 2024-03-27 DIAGNOSIS — E55.9 VITAMIN D DEFICIENCY: ICD-10-CM

## 2024-03-27 DIAGNOSIS — E28.2 PCOS (POLYCYSTIC OVARIAN SYNDROME): ICD-10-CM

## 2024-03-27 DIAGNOSIS — Z00.00 WELL ADULT EXAM: Primary | ICD-10-CM

## 2024-03-27 DIAGNOSIS — N96 RECURRENT PREGNANCY LOSS: Primary | ICD-10-CM

## 2024-03-27 DIAGNOSIS — Z13.220 SCREENING FOR HYPERLIPIDEMIA: ICD-10-CM

## 2024-03-27 DIAGNOSIS — F41.9 ANXIETY AND DEPRESSION: ICD-10-CM

## 2024-03-27 DIAGNOSIS — F32.A ANXIETY AND DEPRESSION: ICD-10-CM

## 2024-03-27 PROCEDURE — 99395 PREV VISIT EST AGE 18-39: CPT | Performed by: PHYSICIAN ASSISTANT

## 2024-03-27 PROCEDURE — 99213 OFFICE O/P EST LOW 20 MIN: CPT | Performed by: PHYSICIAN ASSISTANT

## 2024-03-27 RX ORDER — SERTRALINE HYDROCHLORIDE 100 MG/1
100 TABLET, FILM COATED ORAL DAILY
Qty: 90 TABLET | Refills: 0 | Status: SHIPPED | OUTPATIENT
Start: 2024-03-27

## 2024-03-27 RX ORDER — ERGOCALCIFEROL 1.25 MG/1
50000 CAPSULE ORAL WEEKLY
Qty: 12 CAPSULE | Refills: 0 | Status: SHIPPED | OUTPATIENT
Start: 2024-03-27

## 2024-03-27 RX ORDER — METFORMIN HYDROCHLORIDE 500 MG/1
1000 TABLET, EXTENDED RELEASE ORAL
Qty: 60 TABLET | Refills: 11 | Status: SHIPPED | OUTPATIENT
Start: 2024-03-27 | End: 2025-03-27

## 2024-03-27 RX ADMIN — CYANOCOBALAMIN 1000 MCG: 1000 INJECTION, SOLUTION INTRAMUSCULAR; SUBCUTANEOUS at 10:52

## 2024-03-27 NOTE — PROGRESS NOTES
ADULT ANNUAL PHYSICAL  [facility]    NAME: Paz Pierre  AGE: 28 y.o. SEX: female  : 1995     DATE: 3/27/2024     Assessment and Plan:     Problem List Items Addressed This Visit        Endocrine    PCOS (polycystic ovarian syndrome)     Refill metformin.         Relevant Medications    metFORMIN (GLUCOPHAGE-XR) 500 mg 24 hr tablet       Behavioral Health    Anxiety and depression     Increase sertraline to 150 mg daily.          Relevant Medications    sertraline (ZOLOFT) 100 mg tablet    sertraline (Zoloft) 50 mg tablet       Other    Vitamin D deficiency     Refill Vit D supplementation.         Relevant Medications    ergocalciferol (VITAMIN D2) 50,000 units   Other Visit Diagnoses     Well adult exam    -  Primary    Screening for hyperlipidemia        Relevant Orders    Lipid Panel with Direct LDL reflex          Immunizations and preventive care screenings were discussed with patient today. Appropriate education was printed on patient's after visit summary.    Counseling:  Dental Health: discussed importance of regular tooth brushing, flossing, and dental visits.      Depression Screening and Follow-up Plan: Patient's depression screening was positive with a PHQ-9 score of 15. Patient assessed for underlying major depression. Brief counseling provided and recommend additional follow-up/re-evaluation next office visit.         Return in about 1 year (around 3/27/2025) for Annual physical.     Chief Complaint:     Chief Complaint   Patient presents with   • Physical Exam   • b 12 injection     Pt presents in office for a physical and b 12 injection       History of Present Illness:     Adult Annual Physical   Patient here for a comprehensive physical exam. The patient reports problems - feels .    Diet and Physical Activity  Diet/Nutrition:  trying to change diet, working with weight management .   Exercise: no formal exercise.      Depression  Screening  PHQ-2/9 Depression Screening    Little interest or pleasure in doing things: 2 - more than half the days  Feeling down, depressed, or hopeless: 2 - more than half the days  Trouble falling or staying asleep, or sleeping too much: 1 - several days  Feeling tired or having little energy: 3 - nearly every day  Poor appetite or overeatin - several days  Feeling bad about yourself - or that you are a failure or have let yourself or your family down: 1 - several days  Trouble concentrating on things, such as reading the newspaper or watching television: 2 - more than half the days  Moving or speaking so slowly that other people could have noticed. Or the opposite - being so fidgety or restless that you have been moving around a lot more than usual: 3 - nearly every day  Thoughts that you would be better off dead, or of hurting yourself in some way: 0 - not at all  PHQ-9 Score: 15  PHQ-9 Interpretation: Moderately severe depression       General Health  Sleep: sleeps well.   Hearing: normal - bilateral.  Vision: no vision problems.   Dental: regular dental visits.           Review of Systems:     Review of Systems   Constitutional:  Negative for activity change, appetite change, chills, diaphoresis, fatigue, fever and unexpected weight change.   HENT:  Negative for congestion, ear pain, postnasal drip, rhinorrhea, sinus pressure, sinus pain, sneezing, sore throat, tinnitus and voice change.    Eyes:  Negative for pain, redness and visual disturbance.   Respiratory:  Negative for cough, chest tightness, shortness of breath and wheezing.    Cardiovascular:  Negative for chest pain, palpitations and leg swelling.   Gastrointestinal:  Negative for abdominal pain, blood in stool, constipation, diarrhea, nausea and vomiting.   Genitourinary:  Negative for difficulty urinating, dysuria, frequency, hematuria and urgency.   Musculoskeletal:  Negative for arthralgias, back pain, gait problem, joint swelling, myalgias,  neck pain and neck stiffness.   Skin:  Negative for color change, pallor, rash and wound.   Neurological:  Negative for dizziness, tremors, weakness, light-headedness and headaches.   Psychiatric/Behavioral:  Positive for dysphoric mood. Negative for self-injury, sleep disturbance and suicidal ideas. The patient is not nervous/anxious.       Past Medical History:     Past Medical History:   Diagnosis Date   • Anxiety    • Asthma     as a child   • Depression    • Diet controlled gestational diabetes mellitus (GDM) in third trimester 3/3/2021   • Miscarriage 2023   • Recurrent pregnancy loss 2023   • Urinary tract infection       Past Surgical History:     Past Surgical History:   Procedure Laterality Date   • [place]  DELIVERY ONLY N/A 2021    Procedure:  SECTION ();  Surgeon: Kassandra Martinez MD;  Location: [facility];  Service: Obstetrics      Social History:     Social History     Socioeconomic History   • Marital status: /Civil Union     Spouse name: None   • Number of children: None   • Years of education: None   • Highest education level: None   Occupational History   • None   Tobacco Use   • Smoking status: Never   • Smokeless tobacco: Never   Vaping Use   • Vaping status: Never Used   Substance and Sexual Activity   • Alcohol use: Not Currently   • Drug use: Never   • Sexual activity: Yes     Partners: Male     Birth control/protection: Condom Male   Other Topics Concern   • None   Social History Narrative   • None     Social Determinants of Health     Financial Resource Strain: Not on file   Food Insecurity: Not on file   Transportation Needs: Not on file   Physical Activity: Not on file   Stress: Not on file   Social Connections: Not on file   Intimate Partner Violence: Not on file   Housing Stability: Not on file      Family History:     Family History   Problem Relation Age of Onset   • Asthma Mother    • Diabetes Maternal Grandmother    • Hypertension Maternal  "Grandmother    • Thyroid disease Maternal Grandmother    • Breast cancer Neg Hx    • Colon cancer Neg Hx    • Ovarian cancer Neg Hx       Current Medications:     Current Outpatient Medications   Medication Sig Dispense Refill   • ergocalciferol (VITAMIN D2) 50,000 units Take 1 capsule (50,000 Units total) by mouth once a week 12 capsule 0   • metFORMIN (GLUCOPHAGE-XR) 500 mg 24 hr tablet Take 2 tablets (1,000 mg total) by mouth daily with dinner 60 tablet 11   • Progesterone 200 MG CAPS Insert 200 mg into the vagina daily at bedtime From cycle day 21 until menses. Continue unchanged and call MD if positive pregnancy test. 60 capsule 0   • sertraline (ZOLOFT) 100 mg tablet Take 1 tablet (100 mg total) by mouth daily 90 tablet 0   • sertraline (Zoloft) 50 mg tablet Take 1 tablet (50 mg total) by mouth daily 90 tablet 0   • tirzepatide (Zepbound) 2.5 mg/0.5 mL auto-injector Inject 0.5 mL (2.5 mg total) under the skin once a week for 28 days (Patient taking differently: Inject 2.5 mg under the skin once a week Pt did not start yet waiting for insurance) 2 mL 0     Current Facility-Administered Medications   Medication Dose Route Frequency Provider Last Rate Last Admin   • cyanocobalamin injection 1,000 mcg  1,000 mcg Intramuscular Q30 Days Denise Baca PA-C   1,000 mcg at 03/27/24 1052   • cyanocobalamin injection 1,000 mcg  1,000 mcg Intramuscular Weekly    1,000 mcg at 03/13/24 1014      Allergies:     No Known Allergies   Physical Exam:     /80 (BP Location: Left arm, Patient Position: Sitting, Cuff Size: Adult)   Pulse 90   Temp (!) 97.4 °F (36.3 °C) (Temporal)   Resp 16   Ht 5' 3\" (1.6 m)   Wt 130 kg (287 lb)   SpO2 96%   BMI 50.84 kg/m²     Physical Exam  Vitals and nursing note reviewed.   Constitutional:       General: She is not in acute distress.     Appearance: She is well-developed.   HENT:      Head: Normocephalic and atraumatic.   Eyes:      Conjunctiva/sclera: Conjunctivae normal. "   Cardiovascular:      Rate and Rhythm: Normal rate and regular rhythm.      Heart sounds: No murmur heard.  Pulmonary:      Effort: Pulmonary effort is normal. No respiratory distress.      Breath sounds: Normal breath sounds.   Abdominal:      Palpations: Abdomen is soft.      Tenderness: There is no abdominal tenderness.   Musculoskeletal:         General: No swelling.      Cervical back: Neck supple.   Skin:     General: Skin is warm and dry.      Capillary Refill: Capillary refill takes less than 2 seconds.   Neurological:      Mental Status: She is alert.   Psychiatric:         Mood and Affect: Mood normal.          Denise Baca PA-C   [facility]

## 2024-03-27 NOTE — TELEPHONE ENCOUNTER
Patient called office to ask when she should have ordered blood work drawn. She explained she was told to have it drawn when she gets her period, however she has not yet had her period post miscarriage. Her PCP wanted her to reach out as well, as she is waiting for results. She would also like to know if it is normal that she did not get her period yet. She miscarried the middle of February.

## 2024-03-29 NOTE — TELEPHONE ENCOUNTER
Called patient and left voicemail, per communication consent. Explained HCG to be ordered and can have other labs drawn. Call back with questions or concerns.

## 2024-04-10 ENCOUNTER — CLINICAL SUPPORT (OUTPATIENT)
Dept: FAMILY MEDICINE CLINIC | Facility: CLINIC | Age: 29
End: 2024-04-10
Payer: COMMERCIAL

## 2024-04-10 ENCOUNTER — APPOINTMENT (OUTPATIENT)
Dept: LAB | Facility: MEDICAL CENTER | Age: 29
End: 2024-04-10
Payer: COMMERCIAL

## 2024-04-10 DIAGNOSIS — N96 HISTORY OF MULTIPLE MISCARRIAGES: ICD-10-CM

## 2024-04-10 DIAGNOSIS — N96 RECURRENT PREGNANCY LOSS: ICD-10-CM

## 2024-04-10 DIAGNOSIS — E53.8 VITAMIN B12 DEFICIENCY: Primary | ICD-10-CM

## 2024-04-10 LAB — B-HCG SERPL-ACNC: <1 MIU/ML (ref 0–5)

## 2024-04-10 PROCEDURE — 96372 THER/PROPH/DIAG INJ SC/IM: CPT | Performed by: PHYSICIAN ASSISTANT

## 2024-04-10 PROCEDURE — 84702 CHORIONIC GONADOTROPIN TEST: CPT

## 2024-04-10 RX ADMIN — CYANOCOBALAMIN 1000 MCG: 1000 INJECTION, SOLUTION INTRAMUSCULAR; SUBCUTANEOUS at 10:14

## 2024-04-12 DIAGNOSIS — E66.01 CLASS 3 SEVERE OBESITY WITH SERIOUS COMORBIDITY AND BODY MASS INDEX (BMI) OF 50.0 TO 59.9 IN ADULT, UNSPECIFIED OBESITY TYPE (HCC): ICD-10-CM

## 2024-04-12 DIAGNOSIS — E11.9 TYPE 2 DIABETES MELLITUS WITHOUT COMPLICATION, WITHOUT LONG-TERM CURRENT USE OF INSULIN (HCC): Primary | ICD-10-CM

## 2024-04-12 DIAGNOSIS — E78.2 MIXED HYPERLIPIDEMIA: ICD-10-CM

## 2024-04-12 RX ORDER — SIMVASTATIN 20 MG
20 TABLET ORAL
Qty: 90 TABLET | Refills: 0 | Status: SHIPPED | OUTPATIENT
Start: 2024-04-12

## 2024-04-15 ENCOUNTER — TELEPHONE (OUTPATIENT)
Dept: FAMILY MEDICINE CLINIC | Facility: CLINIC | Age: 29
End: 2024-04-15

## 2024-04-15 NOTE — TELEPHONE ENCOUNTER
Denise Baca sent in a pre authorization for Ozempic for me. Unfortunately, even with that it's still like $900 a month and I unfortunately can't afford that. So I was just wondering if there is any other options to help me.

## 2024-04-16 ENCOUNTER — TELEPHONE (OUTPATIENT)
Dept: FAMILY MEDICINE CLINIC | Facility: CLINIC | Age: 29
End: 2024-04-16

## 2024-04-16 DIAGNOSIS — E66.01 CLASS 3 SEVERE OBESITY WITH SERIOUS COMORBIDITY AND BODY MASS INDEX (BMI) OF 50.0 TO 59.9 IN ADULT, UNSPECIFIED OBESITY TYPE (HCC): ICD-10-CM

## 2024-04-16 DIAGNOSIS — E11.9 TYPE 2 DIABETES MELLITUS WITHOUT COMPLICATION, WITHOUT LONG-TERM CURRENT USE OF INSULIN (HCC): Primary | ICD-10-CM

## 2024-04-16 RX ORDER — DULAGLUTIDE 0.75 MG/.5ML
0.75 INJECTION, SOLUTION SUBCUTANEOUS
Qty: 6 ML | Refills: 1 | Status: SHIPPED | OUTPATIENT
Start: 2024-04-16

## 2024-04-16 NOTE — TELEPHONE ENCOUNTER
Pt should avoid sugars and carbs including breads, pastas, potatoes, corn, she likely is already doing this. Repeat A1C in 3 months.         ----- Message from Genna Garcia sent at 4/16/2024  8:18 AM EDT -----  Regarding: FW: Ozempic  Contact: [phone]    ----- Message -----  From: Paz Pierre  Sent: 4/16/2024   8:06 AM EDT  To: [facility]  Subject: Ozempic                                          Okay, no problem. I wasn’t sure if there was anything else I should/can do since I’m diabetic? Or do I just watch what I eat? Thank you

## 2024-04-17 ENCOUNTER — CLINICAL SUPPORT (OUTPATIENT)
Dept: FAMILY MEDICINE CLINIC | Facility: CLINIC | Age: 29
End: 2024-04-17
Payer: COMMERCIAL

## 2024-04-17 DIAGNOSIS — E53.8 VITAMIN B12 DEFICIENCY: Primary | ICD-10-CM

## 2024-04-17 PROCEDURE — 96372 THER/PROPH/DIAG INJ SC/IM: CPT | Performed by: PHYSICIAN ASSISTANT

## 2024-04-17 RX ADMIN — CYANOCOBALAMIN 1000 MCG: 1000 INJECTION, SOLUTION INTRAMUSCULAR; SUBCUTANEOUS at 10:36

## 2024-04-18 ENCOUNTER — TELEPHONE (OUTPATIENT)
Age: 29
End: 2024-04-18

## 2024-04-19 ENCOUNTER — TELEPHONE (OUTPATIENT)
Dept: OBGYN CLINIC | Facility: MEDICAL CENTER | Age: 29
End: 2024-04-19

## 2024-04-24 ENCOUNTER — CLINICAL SUPPORT (OUTPATIENT)
Dept: FAMILY MEDICINE CLINIC | Facility: CLINIC | Age: 29
End: 2024-04-24
Payer: COMMERCIAL

## 2024-04-24 DIAGNOSIS — E53.8 VITAMIN B12 DEFICIENCY: Primary | ICD-10-CM

## 2024-04-24 PROCEDURE — 96372 THER/PROPH/DIAG INJ SC/IM: CPT | Performed by: PHYSICIAN ASSISTANT

## 2024-04-24 RX ADMIN — CYANOCOBALAMIN 1000 MCG: 1000 INJECTION, SOLUTION INTRAMUSCULAR; SUBCUTANEOUS at 10:04

## 2024-05-01 ENCOUNTER — CLINICAL SUPPORT (OUTPATIENT)
Dept: FAMILY MEDICINE CLINIC | Facility: CLINIC | Age: 29
End: 2024-05-01
Payer: COMMERCIAL

## 2024-05-01 DIAGNOSIS — E53.8 VITAMIN B12 DEFICIENCY: Primary | ICD-10-CM

## 2024-05-01 RX ADMIN — CYANOCOBALAMIN 1000 MCG: 1000 INJECTION, SOLUTION INTRAMUSCULAR; SUBCUTANEOUS at 10:04

## 2024-05-02 ENCOUNTER — TELEPHONE (OUTPATIENT)
Dept: BARIATRICS | Facility: CLINIC | Age: 29
End: 2024-05-02

## 2024-05-02 DIAGNOSIS — E28.2 PCOS (POLYCYSTIC OVARIAN SYNDROME): ICD-10-CM

## 2024-05-02 DIAGNOSIS — E66.01 CLASS 3 OBESITY (HCC): Primary | ICD-10-CM

## 2024-05-03 ENCOUNTER — TELEPHONE (OUTPATIENT)
Dept: FAMILY MEDICINE CLINIC | Facility: CLINIC | Age: 29
End: 2024-05-03

## 2024-05-03 NOTE — TELEPHONE ENCOUNTER
There is not any research to prove that GOLO is effective, also it is not FDA approved.      ----- Message from Genna Garcia sent at 5/3/2024 11:31 AM EDT -----  Regarding: FW: JACQUEZHANNA  Contact: [phone]    ----- Message -----  From: Renee Zhang LPN  Sent: 5/3/2024  11:29 AM EDT  To: [facility]  Subject: FW: ORA                                           ----- Message -----  From: Paz Pierre  Sent: 5/3/2024  11:23 AM EDT  To: [facility]  Subject: ORA Diallo, have you and would you suggest GOLO? I saw it advertised on TV at work so I figured I'd ask. It seems like it helps with Diabetes as well so I just wanted to check with you on that. Thank you so much for your time and help, I appreciate it.

## 2024-05-08 ENCOUNTER — CLINICAL SUPPORT (OUTPATIENT)
Dept: FAMILY MEDICINE CLINIC | Facility: CLINIC | Age: 29
End: 2024-05-08
Payer: COMMERCIAL

## 2024-05-08 ENCOUNTER — CONSULT (OUTPATIENT)
Dept: CARDIOLOGY CLINIC | Facility: HOSPITAL | Age: 29
End: 2024-05-08
Payer: COMMERCIAL

## 2024-05-08 VITALS
WEIGHT: 285 LBS | BODY MASS INDEX: 50.5 KG/M2 | HEART RATE: 83 BPM | SYSTOLIC BLOOD PRESSURE: 148 MMHG | DIASTOLIC BLOOD PRESSURE: 90 MMHG | HEIGHT: 63 IN

## 2024-05-08 DIAGNOSIS — R00.2 PALPITATIONS: Primary | ICD-10-CM

## 2024-05-08 DIAGNOSIS — E53.8 VITAMIN B12 DEFICIENCY: Primary | ICD-10-CM

## 2024-05-08 DIAGNOSIS — R00.0 SINUS TACHYCARDIA: ICD-10-CM

## 2024-05-08 PROCEDURE — 93000 ELECTROCARDIOGRAM COMPLETE: CPT | Performed by: INTERNAL MEDICINE

## 2024-05-08 PROCEDURE — 99203 OFFICE O/P NEW LOW 30 MIN: CPT | Performed by: INTERNAL MEDICINE

## 2024-05-08 PROCEDURE — 96372 THER/PROPH/DIAG INJ SC/IM: CPT | Performed by: PHYSICIAN ASSISTANT

## 2024-05-08 RX ADMIN — CYANOCOBALAMIN 1000 MCG: 1000 INJECTION, SOLUTION INTRAMUSCULAR; SUBCUTANEOUS at 10:08

## 2024-05-08 NOTE — PROGRESS NOTES
Cardiology Consultation     Paz Pierre  [id number]  1995  [facility] [address] [address]      Diagnoses and all orders for this visit:    Palpitations  -     POCT ECG  -     Echo complete w/ contrast if indicated; Future    Sinus tachycardia  -     Ambulatory Referral to Cardiology  -     POCT ECG  -     Echo complete w/ contrast if indicated; Future      I had the pleasure of seeing Paz Pierre for a consultation regarding palpitations and elevated heart rates requested by Denise Baca PA-C    History of the Presenting Illness, Discussion/Summary and my Plan are as follows:::    Paz is a pleasant 28-year-old lady without hypertension.  She does have impaired fasting glucose but no diabetes, mixed dyslipidemia and has been evaluated for PCOS and is awaiting a second opinion by gynecology.    Family history-both parents  young at 42 and 50 of drug overdoses, no known cardiac disease in first-degree relatives.    Activity wise she gets less than 5000 steps a day, has a sedentary job, also dealing with depression and seeing a therapist.  She has a 3-year-old daughter who is doing well    She drinks about 40 ounces of water a day, no caffeine, gets up to 9 hours of sleep but does not feel refreshed although her  has never said that she snores.    She is mainly here for further evaluation of palpitations-multiple times a day without any clear precipitants but usually worse when she stands up, also feels foggy and dizzy at these times and wondering if she might have POTS, presents for further evaluation.    Cardio exam was unremarkable except for a short systolic murmur, orthostatics were negative, heart rate brenna from  with standing but only for a few seconds.    Plan:    Palpitations and tachycardia: Evaluated with a Holter which showed an  average heart rate of 86 bpm with a range of  with normal diurnal variation, had multiple symptom episodes that correlated with sinus rhythm/sinus tachycardia without any arrhythmias.  Would not add any medication such as metoprolol which will only make her feel more tired.    Encouraged increased hydration to at least 60 ounces of Fluid a day including free water and calorie free electrolyte drinks.    Increasing activity level will also help starting with 30 minutes most days of the week, she will try to incorporate that into her routine, now will be moving to a full-time job.  Increasing endurance and lower body tone helps with this as well.  Check echocardiogram for completeness  No need for ischemic evaluation    Mixed dyslipidemia: Will keep an eye on it, usually associated with impaired fasting glucose and elevated blood pressures/metabolic  syndrome.  She is trying to lose weight as well.    Follow-up in 3 months     Latest Reference Range & Units 05/20/19 11:10 11/06/20 10:48 02/25/23 09:59 04/10/24 10:31   Cholesterol See Comment mg/dL 171 168 179 190   Triglycerides See Comment mg/dL 88 117 202 (H) 223 (H)   HDL >=50 mg/dL 45 55 37 (L) 37 (L)   LDL Calculated 0 - 100 mg/dL 108 (H) 90 102 (H) 108 (H)   (H): Data is abnormally high  (L): Data is abnormally low   Latest Reference Range & Units 04/10/24 10:31   Hemoglobin A1C  6.4 (H)   eAG, EST AVG Glucose mg/dl 137   (H): Data is abnormally high   Latest Reference Range & Units 04/10/24 10:31   TSH 3RD GENERATON 0.450 - 4.500 uIU/mL 2.360       1. Palpitations  POCT ECG    Echo complete w/ contrast if indicated      2. Sinus tachycardia  Ambulatory Referral to Cardiology    POCT ECG    Echo complete w/ contrast if indicated        Patient Active Problem List   Diagnosis    Vitamin D deficiency    Arcuate uterus    Anxiety and depression    Vitamin B12 deficiency    Miscarriage    Recurrent pregnancy loss    Class 3 obesity (HCC)    Shaky    PCOS  (polycystic ovarian syndrome)    Sinus tachycardia     Past Medical History:   Diagnosis Date    Anxiety     Asthma     as a child    Depression     Diet controlled gestational diabetes mellitus (GDM) in third trimester 3/3/2021    Miscarriage 2023    Recurrent pregnancy loss 2023    Urinary tract infection      Social History     Socioeconomic History    Marital status: /Civil Union     Spouse name: Not on file    Number of children: Not on file    Years of education: Not on file    Highest education level: Not on file   Occupational History    Not on file   Tobacco Use    Smoking status: Never    Smokeless tobacco: Never   Vaping Use    Vaping status: Never Used   Substance and Sexual Activity    Alcohol use: Not Currently    Drug use: Never    Sexual activity: Yes     Partners: Male     Birth control/protection: Condom Male   Other Topics Concern    Not on file   Social History Narrative    Not on file     Social Determinants of Health     Financial Resource Strain: Not on file   Food Insecurity: Not on file   Transportation Needs: Not on file   Physical Activity: Not on file   Stress: Not on file   Social Connections: Not on file   Intimate Partner Violence: Not on file   Housing Stability: Not on file      Family History   Problem Relation Age of Onset    Asthma Mother     Diabetes Maternal Grandmother     Hypertension Maternal Grandmother     Thyroid disease Maternal Grandmother     Breast cancer Neg Hx     Colon cancer Neg Hx     Ovarian cancer Neg Hx      Past Surgical History:   Procedure Laterality Date    [place]  DELIVERY ONLY N/A 2021    Procedure:  SECTION ();  Surgeon: Kassandra Martinez MD;  Location: [facility];  Service: Obstetrics       Current Outpatient Medications:     sertraline (ZOLOFT) 100 mg tablet, Take 1 tablet (100 mg total) by mouth daily, Disp: 90 tablet, Rfl: 0    sertraline (Zoloft) 50 mg tablet, Take 1 tablet (50 mg total) by mouth daily, Disp: 90  "tablet, Rfl: 0    simvastatin (ZOCOR) 20 mg tablet, Take 1 tablet (20 mg total) by mouth daily at bedtime, Disp: 90 tablet, Rfl: 0    dulaglutide (Trulicity) 0.75 MG/0.5ML injection, Inject 0.5 mL (0.75 mg total) under the skin every 7 days (Patient not taking: Reported on 5/8/2024), Disp: 6 mL, Rfl: 1    ergocalciferol (VITAMIN D2) 50,000 units, Take 1 capsule (50,000 Units total) by mouth once a week (Patient not taking: Reported on 5/8/2024), Disp: 12 capsule, Rfl: 0    metFORMIN (GLUCOPHAGE-XR) 500 mg 24 hr tablet, Take 2 tablets (1,000 mg total) by mouth daily with dinner (Patient not taking: Reported on 5/8/2024), Disp: 60 tablet, Rfl: 11    Current Facility-Administered Medications:     cyanocobalamin injection 1,000 mcg, 1,000 mcg, Intramuscular, Q30 Days, Denise Baca PA-C, 1,000 mcg at 05/01/24 1004    cyanocobalamin injection 1,000 mcg, 1,000 mcg, Intramuscular, Weekly, , 1,000 mcg at 05/08/24 1008  No Known Allergies  Vitals:    05/08/24 1341   BP: 148/90   Pulse: 83   Weight: 129 kg (285 lb)   Height: 5' 3\" (1.6 m)         Imaging: No results found.    Review of Systems:  Review of Systems   Constitutional: Negative.    HENT: Negative.     Eyes: Negative.    Respiratory: Negative.     Cardiovascular: Negative.    Musculoskeletal: Negative.        Physical Exam:    /90   Pulse 83   Ht 5' 3\" (1.6 m)   Wt 129 kg (285 lb)   BMI 50.49 kg/m²   Physical Exam  Constitutional:       General: She is not in acute distress.     Appearance: She is not ill-appearing, toxic-appearing or diaphoretic.   HENT:      Mouth/Throat:      Mouth: Mucous membranes are moist.      Pharynx: No oropharyngeal exudate or posterior oropharyngeal erythema.   Neck:      Vascular: No carotid bruit.   Cardiovascular:      Rate and Rhythm: Normal rate and regular rhythm.      Heart sounds: No murmur heard.     No friction rub. No gallop.   Pulmonary:      Effort: Pulmonary effort is normal. No respiratory distress.      Breath " "sounds: No stridor. No wheezing or rhonchi.   Musculoskeletal:         General: No swelling, tenderness, deformity or signs of injury. Normal range of motion.      Cervical back: Normal range of motion. No rigidity or tenderness.   Lymphadenopathy:      Cervical: No cervical adenopathy.   Neurological:      Mental Status: She is alert.            This note was completed in part utilizing [employer] direct voice recognition software.   Grammatical errors, random word insertion, spelling mistakes, occasional wrong word or \"sound-alike\" substitutions and incomplete sentences may be an occasional consequence of the system secondary to software limitations, ambient noise and hardware issues. At the time of dictation, efforts were made to edit, clarify and /or correct errors.  Please read the chart carefully and recognize, using context, where substitutions have occurred.  If you have any questions or concerns about the context, text or information contained within the body of this dictation, please contact myself, the provider, for further clarification.  "

## 2024-05-09 ENCOUNTER — OFFICE VISIT (OUTPATIENT)
Dept: OBGYN CLINIC | Facility: CLINIC | Age: 29
End: 2024-05-09
Payer: COMMERCIAL

## 2024-05-09 VITALS
SYSTOLIC BLOOD PRESSURE: 120 MMHG | BODY MASS INDEX: 50.5 KG/M2 | HEIGHT: 63 IN | DIASTOLIC BLOOD PRESSURE: 86 MMHG | WEIGHT: 285 LBS

## 2024-05-09 DIAGNOSIS — N96 RECURRENT PREGNANCY LOSS: ICD-10-CM

## 2024-05-09 DIAGNOSIS — R76.0 LUPUS ANTICOAGULANT POSITIVE: Primary | ICD-10-CM

## 2024-05-09 PROCEDURE — 99213 OFFICE O/P EST LOW 20 MIN: CPT | Performed by: OBSTETRICS & GYNECOLOGY

## 2024-05-09 NOTE — PROGRESS NOTES
"Assessment:  28 y.o.  with positive lupus anticoagulant testing in the setting of RPL.    Plan:  Diagnoses and all orders for this visit:    Lupus anticoagulant positive  Recurrent pregnancy loss  -     Lupus anticoagulant; Future  -     Beta-2 glycoprotein antibodies; Future  -     Cardiolipin antibody; Future        __________________________________________________________________    Subjective   Paz Pierre is a 28 y.o.  who presents to discuss recent lab testing for RPL.    Patient reports stopped metformin. Reports had 2wk of nausea, diarrhea. Even had loss of stool accidentally.  Had been on prior without issue. Resolved after stopping. Considering Truclicity vs ozempic. Approved for ozempic, but even with coverage its cost prohibitive.     Reviewed recent testing. Initial APL labs were normal, however most recent LA+. Discussed need for confirmation testing. Reviewed APL syndrome and contribution to RPL and inc risk VTE. Discussed possible anticoagulation regimens that may help. Questions answered to pt satisfaction.      The following portions of the patient's history were reviewed and updated as appropriate: allergies, current medications, past medical history, past social history, past surgical history, and problem list.    Review of Systems  Review of Systems   Constitutional:  Negative for chills and fever.   Respiratory:  Negative for shortness of breath.    Cardiovascular:  Negative for chest pain and palpitations.   Gastrointestinal:  Positive for diarrhea (with metformin) and nausea. Negative for abdominal distention, abdominal pain, constipation and vomiting.   Genitourinary:  Negative for dysuria, flank pain, pelvic pain, vaginal bleeding, vaginal discharge and vaginal pain.   Neurological:  Negative for dizziness, light-headedness and headaches.   Psychiatric/Behavioral:  The patient is nervous/anxious.             Objective  /86   Ht 5' 3\" (1.6 m)   Wt 129 kg (285 " lb)   LMP 05/09/2024 (Exact Date)   Breastfeeding No   BMI 50.49 kg/m²      Physical Exam:  Physical Exam  Constitutional:       General: She is not in acute distress.     Appearance: Normal appearance. She is not ill-appearing, toxic-appearing or diaphoretic.   Eyes:      General: No scleral icterus.        Right eye: No discharge.         Left eye: No discharge.      Conjunctiva/sclera: Conjunctivae normal.   Cardiovascular:      Rate and Rhythm: Normal rate.   Pulmonary:      Effort: Pulmonary effort is normal. No respiratory distress.   Abdominal:      General: There is no distension.      Palpations: There is no mass.      Tenderness: There is no abdominal tenderness. There is no guarding or rebound.      Hernia: No hernia is present.   Musculoskeletal:         General: No swelling.   Skin:     General: Skin is warm and dry.      Coloration: Skin is not jaundiced or pale.      Findings: No bruising or erythema.   Neurological:      Mental Status: She is alert.   Psychiatric:         Mood and Affect: Mood normal.         Behavior: Behavior normal.         Thought Content: Thought content normal.         Judgment: Judgment normal.           Lab Review  B2 glycoprotein, lupus anticoagulant, cardiolipin ab

## 2024-05-15 ENCOUNTER — CLINICAL SUPPORT (OUTPATIENT)
Dept: FAMILY MEDICINE CLINIC | Facility: CLINIC | Age: 29
End: 2024-05-15
Payer: COMMERCIAL

## 2024-05-15 DIAGNOSIS — E53.8 VITAMIN B12 DEFICIENCY: Primary | ICD-10-CM

## 2024-05-15 PROCEDURE — 96372 THER/PROPH/DIAG INJ SC/IM: CPT | Performed by: PHYSICIAN ASSISTANT

## 2024-05-15 RX ADMIN — CYANOCOBALAMIN 1000 MCG: 1000 INJECTION, SOLUTION INTRAMUSCULAR; SUBCUTANEOUS at 09:41

## 2024-05-29 DIAGNOSIS — F32.A ANXIETY AND DEPRESSION: ICD-10-CM

## 2024-05-29 DIAGNOSIS — F41.9 ANXIETY AND DEPRESSION: ICD-10-CM

## 2024-05-29 DIAGNOSIS — E55.9 VITAMIN D DEFICIENCY: ICD-10-CM

## 2024-05-30 RX ORDER — SERTRALINE HYDROCHLORIDE 100 MG/1
100 TABLET, FILM COATED ORAL DAILY
Qty: 90 TABLET | Refills: 0 | Status: SHIPPED | OUTPATIENT
Start: 2024-05-30

## 2024-05-30 RX ORDER — ERGOCALCIFEROL 1.25 MG/1
50000 CAPSULE ORAL WEEKLY
Qty: 12 CAPSULE | Refills: 0 | Status: SHIPPED | OUTPATIENT
Start: 2024-05-30

## 2024-06-04 ENCOUNTER — TELEPHONE (OUTPATIENT)
Age: 29
End: 2024-06-04

## 2024-06-04 ENCOUNTER — NURSE TRIAGE (OUTPATIENT)
Age: 29
End: 2024-06-04

## 2024-06-04 NOTE — TELEPHONE ENCOUNTER
"Pt reports a maya sized lump on her right labia x1 month. States it has not improved or worsened in the last month. Bothersome when she wipes only. Denies any discharge. LMP about 3 weeks ago. Pt given appointment for tomorrow morning and is thankful.      Reason for Disposition   ALL other vulvar symptoms (Exception: feels like prior yeast infection, or rash < 24 hour duration)    Answer Assessment - Initial Assessment Questions  1. SYMPTOM: \"What's the main symptom you're concerned about?\" (e.g., rash, itching, swelling, dryness)      Maya sized lump on right labia  2. LOCATION: \"Where is the  lump located?\" (e.g., inside/outside, left/right)      Right labia  3. ONSET: \"When did the  lump  start?\"      One month ago  4. PAIN: \"Is there any pain?\" If Yes, ask: \"How bad is it?\" (Scale: 1-10; mild, moderate, severe)    -  MILD (1-3): doesn't interfere with normal activities     -  MODERATE (4-7): interferes with normal activities (e.g., work or school) or awakens from sleep      -  SEVERE (8-10): excruciating pain, unable to do any normal activities      Only with wiping  5. CAUSE: \"What do you think is causing the symptoms?\"      unsure  6. OTHER SYMPTOMS: \"Do you have any other symptoms?\" (e.g., fever, vaginal bleeding, pain with urination)      denies  7. PREGNANCY: \"Is there any chance you are pregnant?\" \"When was your last menstrual period?\"      denies    Protocols used: Vulvar Symptoms-ADULT-[place]    "

## 2024-06-04 NOTE — TELEPHONE ENCOUNTER
Patient called, states she wanted to see if the b12 labs were in the system. Patient states she just finished 3 months of the vitamin b12, would like to go to the lab to have blood work tomorrow to see where the levels are at. Patient states she would like a call back when the order is placed in the system.     Please advise.

## 2024-06-05 ENCOUNTER — LAB (OUTPATIENT)
Dept: LAB | Facility: MEDICAL CENTER | Age: 29
End: 2024-06-05
Payer: COMMERCIAL

## 2024-06-05 ENCOUNTER — OFFICE VISIT (OUTPATIENT)
Dept: OBGYN CLINIC | Facility: CLINIC | Age: 29
End: 2024-06-05
Payer: COMMERCIAL

## 2024-06-05 VITALS
DIASTOLIC BLOOD PRESSURE: 72 MMHG | WEIGHT: 285 LBS | HEIGHT: 63 IN | BODY MASS INDEX: 50.5 KG/M2 | SYSTOLIC BLOOD PRESSURE: 110 MMHG

## 2024-06-05 DIAGNOSIS — N76.4 LABIAL ABSCESS: Primary | ICD-10-CM

## 2024-06-05 DIAGNOSIS — N96 RECURRENT PREGNANCY LOSS: ICD-10-CM

## 2024-06-05 DIAGNOSIS — R76.0 LUPUS ANTICOAGULANT POSITIVE: ICD-10-CM

## 2024-06-05 DIAGNOSIS — R39.9 UTI SYMPTOMS: ICD-10-CM

## 2024-06-05 DIAGNOSIS — E53.8 VITAMIN B12 DEFICIENCY: ICD-10-CM

## 2024-06-05 DIAGNOSIS — E53.8 VITAMIN B12 DEFICIENCY: Primary | ICD-10-CM

## 2024-06-05 LAB
BACTERIA UR QL AUTO: ABNORMAL /HPF
BILIRUB UR QL STRIP: NEGATIVE
CLARITY UR: CLEAR
COLOR UR: ABNORMAL
GLUCOSE UR STRIP-MCNC: NEGATIVE MG/DL
HGB UR QL STRIP.AUTO: NEGATIVE
KETONES UR STRIP-MCNC: NEGATIVE MG/DL
LEUKOCYTE ESTERASE UR QL STRIP: NEGATIVE
MUCOUS THREADS UR QL AUTO: ABNORMAL
NITRITE UR QL STRIP: NEGATIVE
NON-SQ EPI CELLS URNS QL MICRO: ABNORMAL /HPF
PH UR STRIP.AUTO: 6 [PH]
PROT UR STRIP-MCNC: ABNORMAL MG/DL
RBC #/AREA URNS AUTO: ABNORMAL /HPF
SP GR UR STRIP.AUTO: 1.03 (ref 1–1.03)
UROBILINOGEN UR STRIP-ACNC: <2 MG/DL
VIT B12 SERPL-MCNC: 442 PG/ML (ref 180–914)
WBC #/AREA URNS AUTO: ABNORMAL /HPF

## 2024-06-05 PROCEDURE — 99213 OFFICE O/P EST LOW 20 MIN: CPT | Performed by: ADVANCED PRACTICE MIDWIFE

## 2024-06-05 PROCEDURE — 87086 URINE CULTURE/COLONY COUNT: CPT | Performed by: ADVANCED PRACTICE MIDWIFE

## 2024-06-05 PROCEDURE — 82607 VITAMIN B-12: CPT

## 2024-06-05 PROCEDURE — 36415 COLL VENOUS BLD VENIPUNCTURE: CPT

## 2024-06-05 PROCEDURE — 81001 URINALYSIS AUTO W/SCOPE: CPT | Performed by: ADVANCED PRACTICE MIDWIFE

## 2024-06-05 RX ORDER — CEPHALEXIN 500 MG/1
500 CAPSULE ORAL EVERY 8 HOURS SCHEDULED
Qty: 21 CAPSULE | Refills: 0 | Status: SHIPPED | OUTPATIENT
Start: 2024-06-05 | End: 2024-06-12

## 2024-06-05 NOTE — PROGRESS NOTES
"[facility] [address], [place], [place]    Assessment/Plan:  No problem-specific Assessment & Plan notes found for this encounter.    Diagnoses and all orders for this visit:    Labial abscess  -     cephalexin (KEFLEX) 500 mg capsule; Take 1 capsule (500 mg total) by mouth every 8 (eight) hours for 7 days  - discussed option of I&D vs warm compresses and if does not resolve I&D   - declined I&D due to past hx of having an abscess drained  - will call if no improvement and/or desires I&D    UTI symptoms  - UA, C&S sent    Warm compress 10-15 minutes 3 times per day  Completed antibiotics as ordered  Topical antibiotic 2 times daily- 5 days    Subjective:   Paz Pierre is a 28 y.o.  female.  CC: labial lesion    HPI: Paz is here with complaints of a right labial lump, approximately the size of a dime for the past month. No drainage or discharge. Denies fever or flu-like symptoms. No vaginal discharge. Only using pads with menses. LMP 2024. Sexually active, no pain with intercourse. States that she is having some delayed voiding.         ROS: Review of Systems   Constitutional:  Negative for chills and fever.   Gastrointestinal:  Negative for constipation and diarrhea.        Resolved since stopping metformin, but still having loose stool randomly. Stopped Metformin about a month ago.    Genitourinary:  Positive for difficulty urinating. Negative for dyspareunia, dysuria, frequency, pelvic pain, urgency, vaginal bleeding, vaginal discharge and vaginal pain.       PFSH: The following portions of the patient's history were reviewed and updated as appropriate: allergies, current medications, past family history, past medical history, obstetric history, gynecologic history, past social history, past surgical history and problem list.       Objective:  /72   Ht 5' 3\" (1.6 m)   Wt 129 kg (285 lb)   LMP 2024 (Exact Date)   BMI 50.49 kg/m²    Physical " Exam  Vitals reviewed.   Constitutional:       Appearance: Normal appearance.   Abdominal:      General: Abdomen is flat. Bowel sounds are normal.      Palpations: Abdomen is soft.   Genitourinary:     Exam position: Lithotomy position.      Labia:         Right: Tenderness and lesion present. No rash.         Left: No rash, tenderness or lesion.       Urethra: No urethral pain, urethral swelling or urethral lesion.      Comments: Right inner aspect of the labia majora. 4 mm x 5 mm lump. Small pinpoint pustules noted, no fluctuance, mild erythema.   Skin:     Capillary Refill: Capillary refill takes less than 2 seconds.   Neurological:      Mental Status: She is alert and oriented to person, place, and time.   Psychiatric:         Mood and Affect: Mood normal.         Behavior: Behavior normal.

## 2024-06-05 NOTE — PATIENT INSTRUCTIONS
Warm compress 10-15 minutes 3 times per day  Completed antibiotics as ordered  Topical antibiotic 2 times daily

## 2024-06-05 NOTE — TELEPHONE ENCOUNTER
Patient called, states she is at the lab now, wants to confirm orders were placed for the b12.     RN confirmed lab orders were placed as requested.     Patient verbalized understanding, no further questions/concerns at this time.

## 2024-06-06 LAB — BACTERIA UR CULT: NORMAL

## 2024-06-06 NOTE — RESULT ENCOUNTER NOTE
Before I call the patient, she has been coming in for B12 injections weekly since Feb then last injection was 5/15    2/21-28  3/6-13-20  4/10-17-24  5/1-8-15

## 2024-06-07 ENCOUNTER — TELEPHONE (OUTPATIENT)
Dept: FAMILY MEDICINE CLINIC | Facility: CLINIC | Age: 29
End: 2024-06-07

## 2024-06-07 NOTE — TELEPHONE ENCOUNTER
Sorry, the last I saw in the chart was that she's been getting it monthly. If she's been doing it weekly, then I would agree and recommend she try taking it twice weekly x 1 month and then back to weekly. I agree, this is likely going to be a life long treatment that we have to constantly be adjusting. I had offered her in the past to administer it at home, but I think she was not comfortable for some reason. I would like her to come here for 1 more shot and demonstrate to the nurses that she is comfortable and able to do this at home and administer the shot to herself in front of staff. As long as she is able to do that, she can continue the rest at her home.

## 2024-06-14 ENCOUNTER — TELEPHONE (OUTPATIENT)
Dept: FAMILY MEDICINE CLINIC | Facility: CLINIC | Age: 29
End: 2024-06-14

## 2024-06-14 NOTE — TELEPHONE ENCOUNTER
I'm confused, is she feeling fatigued or is she feeling restless?  When most people say they are restless, they cannot fall asleep. But she is saying no matter how much she sleeps, she feels tired?

## 2024-06-18 ENCOUNTER — TELEPHONE (OUTPATIENT)
Dept: FAMILY MEDICINE CLINIC | Facility: CLINIC | Age: 29
End: 2024-06-18

## 2024-06-18 NOTE — TELEPHONE ENCOUNTER
Patient called, states she just missed a call.     RN informed patient of provider note:        Patient verbalized understanding, states she would like to complete the home sleep study. Referral can be placed.

## 2024-06-19 DIAGNOSIS — R29.818 SUSPECTED SLEEP APNEA: Primary | ICD-10-CM

## 2024-06-25 ENCOUNTER — TELEPHONE (OUTPATIENT)
Dept: SLEEP CENTER | Facility: CLINIC | Age: 29
End: 2024-06-25

## 2024-06-25 DIAGNOSIS — R53.83 OTHER FATIGUE: Primary | ICD-10-CM

## 2024-06-25 DIAGNOSIS — R29.818 SUSPECTED SLEEP APNEA: ICD-10-CM

## 2024-06-25 NOTE — TELEPHONE ENCOUNTER
Please let patient know that sleep medicine will not approve her sleep study without having a consultation with them first. Will place order for consultation.

## 2024-06-26 ENCOUNTER — HOSPITAL ENCOUNTER (OUTPATIENT)
Dept: NON INVASIVE DIAGNOSTICS | Facility: HOSPITAL | Age: 29
Discharge: HOME | End: 2024-06-26
Attending: INTERNAL MEDICINE
Payer: COMMERCIAL

## 2024-06-26 ENCOUNTER — APPOINTMENT (OUTPATIENT)
Dept: LAB | Facility: HOSPITAL | Age: 29
End: 2024-06-26
Payer: COMMERCIAL

## 2024-06-26 VITALS
DIASTOLIC BLOOD PRESSURE: 56 MMHG | SYSTOLIC BLOOD PRESSURE: 118 MMHG | WEIGHT: 284.39 LBS | BODY MASS INDEX: 50.39 KG/M2 | HEIGHT: 63 IN | HEART RATE: 95 BPM

## 2024-06-26 DIAGNOSIS — E11.9 TYPE 2 DIABETES MELLITUS WITHOUT COMPLICATION, WITHOUT LONG-TERM CURRENT USE OF INSULIN (HCC): ICD-10-CM

## 2024-06-26 DIAGNOSIS — R00.2 PALPITATIONS: ICD-10-CM

## 2024-06-26 DIAGNOSIS — R00.0 SINUS TACHYCARDIA: ICD-10-CM

## 2024-06-26 LAB
AORTIC ROOT: 2.8 CM
BSA FOR ECHO PROCEDURE: 2.25 M2
E WAVE DECELERATION TIME: 223 MS
E/A RATIO: 1.49
FRACTIONAL SHORTENING: 26 (ref 28–44)
INTERVENTRICULAR SEPTUM IN DIASTOLE (PARASTERNAL SHORT AXIS VIEW): 0.8 CM
INTERVENTRICULAR SEPTUM: 0.8 CM (ref 0.6–1.1)
LAAS-AP2: 22 CM2
LAAS-AP4: 17.7 CM2
LEFT ATRIUM SIZE: 3.7 CM
LEFT ATRIUM VOLUME (MOD BIPLANE): 61 ML
LEFT ATRIUM VOLUME INDEX (MOD BIPLANE): 27.1 ML/M2
LEFT INTERNAL DIMENSION IN SYSTOLE: 3.5 CM (ref 2.1–4)
LEFT VENTRICULAR INTERNAL DIMENSION IN DIASTOLE: 4.7 CM (ref 3.5–6)
LEFT VENTRICULAR POSTERIOR WALL IN END DIASTOLE: 0.8 CM
LEFT VENTRICULAR STROKE VOLUME: 51 ML
LVSV (TEICH): 51 ML
MV E'TISSUE VEL-LAT: 15 CM/S
MV E'TISSUE VEL-SEP: 11 CM/S
MV PEAK A VEL: 0.59 M/S
MV PEAK E VEL: 88 CM/S
MV STENOSIS PRESSURE HALF TIME: 65 MS
MV VALVE AREA P 1/2 METHOD: 3.38
PULM VEIN S/D RATIO: 1.05
PV PEAK D VEL: 0.19 M/S
PV PEAK S VEL: 0.2 M/S
RIGHT ATRIUM AREA SYSTOLE A4C: 13.9 CM2
RIGHT VENTRICLE ID DIMENSION: 2.8 CM
SL CV LEFT ATRIUM LENGTH A2C: 5.9 CM
SL CV LV EF: 60
SL CV PED ECHO LEFT VENTRICLE DIASTOLIC VOLUME (MOD BIPLANE) 2D: 101 ML
SL CV PED ECHO LEFT VENTRICLE SYSTOLIC VOLUME (MOD BIPLANE) 2D: 50 ML
TRICUSPID ANNULAR PLANE SYSTOLIC EXCURSION: 3.2 CM
TRICUSPID VALVE PEAK E WAVE VELOCITY: 0.14 M/S

## 2024-06-26 PROCEDURE — 85670 THROMBIN TIME PLASMA: CPT

## 2024-06-26 PROCEDURE — 86147 CARDIOLIPIN ANTIBODY EA IG: CPT

## 2024-06-26 PROCEDURE — 85732 THROMBOPLASTIN TIME PARTIAL: CPT

## 2024-06-26 PROCEDURE — 85613 RUSSELL VIPER VENOM DILUTED: CPT

## 2024-06-26 PROCEDURE — 93306 TTE W/DOPPLER COMPLETE: CPT | Performed by: INTERNAL MEDICINE

## 2024-06-26 PROCEDURE — 86146 BETA-2 GLYCOPROTEIN ANTIBODY: CPT

## 2024-06-26 PROCEDURE — 93306 TTE W/DOPPLER COMPLETE: CPT

## 2024-06-26 PROCEDURE — 85705 THROMBOPLASTIN INHIBITION: CPT

## 2024-06-26 PROCEDURE — 83036 HEMOGLOBIN GLYCOSYLATED A1C: CPT

## 2024-06-27 LAB
EST. AVERAGE GLUCOSE BLD GHB EST-MCNC: 131 MG/DL
HBA1C MFR BLD: 6.2 %

## 2024-06-27 PROCEDURE — 3044F HG A1C LEVEL LT 7.0%: CPT | Performed by: ADVANCED PRACTICE MIDWIFE

## 2024-06-28 DIAGNOSIS — R73.09 ELEVATED GLUCOSE: Primary | ICD-10-CM

## 2024-06-28 LAB
APTT SCREEN TO CONFIRM RATIO: 1.18 RATIO (ref 0–1.34)
CONFIRM APTT/NORMAL: 39.5 SEC (ref 0–47.6)
LA PPP-IMP: NORMAL
SCREEN APTT: 41.1 SEC (ref 0–43.5)
SCREEN DRVVT: 46.2 SEC (ref 0–47)
THROMBIN TIME: 18.2 SEC (ref 0–23)

## 2024-06-29 LAB
B2 GLYCOPROT1 IGA SERPL IA-ACNC: 1
B2 GLYCOPROT1 IGG SERPL IA-ACNC: <0.8
B2 GLYCOPROT1 IGM SERPL IA-ACNC: <2.4

## 2024-06-30 LAB
CARDIOLIPIN IGA SER IA-ACNC: 1.7
CARDIOLIPIN IGG SER IA-ACNC: 0.7
CARDIOLIPIN IGM SER IA-ACNC: 2

## 2024-07-01 ENCOUNTER — OFFICE VISIT (OUTPATIENT)
Dept: OBGYN CLINIC | Facility: CLINIC | Age: 29
End: 2024-07-01
Payer: COMMERCIAL

## 2024-07-01 VITALS
SYSTOLIC BLOOD PRESSURE: 124 MMHG | WEIGHT: 283.4 LBS | BODY MASS INDEX: 50.21 KG/M2 | DIASTOLIC BLOOD PRESSURE: 80 MMHG | HEIGHT: 63 IN

## 2024-07-01 DIAGNOSIS — N64.4 PAIN OF LEFT BREAST: ICD-10-CM

## 2024-07-01 DIAGNOSIS — Z01.419 WOMEN'S ANNUAL ROUTINE GYNECOLOGICAL EXAMINATION: Primary | ICD-10-CM

## 2024-07-01 PROCEDURE — S0612 ANNUAL GYNECOLOGICAL EXAMINA: HCPCS | Performed by: OBSTETRICS & GYNECOLOGY

## 2024-07-01 NOTE — PROGRESS NOTES
A/P    1.  Annual exam    Last PAP - 6/20/22- neg   Next due 2025   Scheduling of pap discussed in detail     2.  Left breat pain    Family history - none    Started - last Monday and it has got better    Tried - nothing    Related to cycle no      For sonogram of the breast        28 y.o.,No LMP recorded.  C/O breast tenderness.       Past medical / social / surgical / family history reviewed and updated   Medication and allergies discussed in detail and updated     Review of Systems - History obtained from chart review and the patient  General ROS: negative  Psychological ROS: negative  Ophthalmic ROS: negative  ENT ROS: negative  Allergy and Immunology ROS: negative  Hematological and Lymphatic ROS: negative  Endocrine ROS: negative  Breast ROS: negative for breast lumps  + pain on left.   Respiratory ROS: no cough, shortness of breath, or wheezing  Cardiovascular ROS: no chest pain or dyspnea on exertion  Gastrointestinal ROS: no abdominal pain, change in bowel habits, or black or bloody stools  Genito-Urinary ROS: no dysuria, trouble voiding, or hematuria  Musculoskeletal ROS: negative  Neurological ROS: no TIA or stroke symptoms  Dermatological ROS: negative        Physical Exam  Vitals reviewed. Exam conducted with a chaperone present.   Constitutional:       Appearance: She is well-developed.   Neck:      Thyroid: No thyromegaly.   Cardiovascular:      Rate and Rhythm: Normal rate.      Heart sounds: Normal heart sounds.   Pulmonary:      Effort: Pulmonary effort is normal. No accessory muscle usage or respiratory distress.      Breath sounds: Normal air entry.   Chest:      Chest wall: No tenderness.   Breasts:     Breasts are symmetrical.      Right: No inverted nipple, mass or tenderness.      Left: No inverted nipple, mass or tenderness.       Abdominal:      General: There is no distension.      Palpations: Abdomen is soft.      Tenderness: There is no abdominal tenderness. There is no right CVA  tenderness, left CVA tenderness, guarding or rebound.   Genitourinary:     General: Normal vulva.      Exam position: Lithotomy position.      Labia:         Right: No rash, tenderness, lesion or injury.         Left: No rash, tenderness, lesion or injury.       Vagina: Normal. No foreign body. No vaginal discharge or bleeding.      Cervix: Normal.      Uterus: Normal. Not enlarged and not fixed.       Adnexa: Right adnexa normal and left adnexa normal.        Right: No mass, tenderness or fullness.          Left: No mass, tenderness or fullness.        Rectum: No external hemorrhoid.   Musculoskeletal:      Cervical back: Normal range of motion.   Lymphadenopathy:      Cervical: No cervical adenopathy.      Upper Body:      Right upper body: No supraclavicular adenopathy.      Left upper body: No supraclavicular adenopathy.   Neurological:      Mental Status: She is alert and oriented to person, place, and time.   Psychiatric:         Speech: Speech normal.         Behavior: Behavior normal.         Thought Content: Thought content normal.         Judgment: Judgment normal.

## 2024-07-02 ENCOUNTER — OFFICE VISIT (OUTPATIENT)
Dept: SLEEP CENTER | Facility: CLINIC | Age: 29
End: 2024-07-02
Payer: COMMERCIAL

## 2024-07-02 ENCOUNTER — HOSPITAL ENCOUNTER (OUTPATIENT)
Dept: SLEEP CENTER | Facility: HOSPITAL | Age: 29
Discharge: HOME | End: 2024-07-02
Payer: COMMERCIAL

## 2024-07-02 VITALS
DIASTOLIC BLOOD PRESSURE: 70 MMHG | BODY MASS INDEX: 49.96 KG/M2 | WEIGHT: 282 LBS | HEART RATE: 92 BPM | HEIGHT: 63 IN | SYSTOLIC BLOOD PRESSURE: 116 MMHG | OXYGEN SATURATION: 95 %

## 2024-07-02 DIAGNOSIS — R53.83 OTHER FATIGUE: ICD-10-CM

## 2024-07-02 DIAGNOSIS — G47.39 SLEEP APNEA-LIKE BEHAVIOR: ICD-10-CM

## 2024-07-02 DIAGNOSIS — F32.A ANXIETY AND DEPRESSION: ICD-10-CM

## 2024-07-02 DIAGNOSIS — F51.5 NIGHTMARES: ICD-10-CM

## 2024-07-02 DIAGNOSIS — G47.20 CIRCADIAN RHYTHM SLEEP DISORDER: ICD-10-CM

## 2024-07-02 DIAGNOSIS — R29.818 SUSPECTED SLEEP APNEA: ICD-10-CM

## 2024-07-02 DIAGNOSIS — G47.39 SLEEP APNEA-LIKE BEHAVIOR: Primary | ICD-10-CM

## 2024-07-02 DIAGNOSIS — G47.19 EXCESSIVE DAYTIME SLEEPINESS: ICD-10-CM

## 2024-07-02 DIAGNOSIS — F41.9 ANXIETY AND DEPRESSION: ICD-10-CM

## 2024-07-02 PROCEDURE — G0399 HOME SLEEP TEST/TYPE 3 PORTA: HCPCS

## 2024-07-02 PROCEDURE — 99205 OFFICE O/P NEW HI 60 MIN: CPT | Performed by: NURSE PRACTITIONER

## 2024-07-02 NOTE — PROGRESS NOTES
Home Sleep Study Documentation    HOME STUDY DEVICE: Noxturnal no                                           Luz G3 yes      Pre-Sleep Home Study:    Set-up and instructions performed by: JIGNA Miller    Technician performed demonstration for Patient: yes    Return demonstration performed by Patient: yes    Written instructions provided to Patient: yes    Patient signed consent form: yes        Post-Sleep Home Study:    Additional comments by Patient: pending    Home Sleep Study Failed:pending    Failure reason: pending    Reported or Detected: pending    Scored by: pending
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
## 2024-07-02 NOTE — PATIENT INSTRUCTIONS
Recommend Vitron C supplement every other day to improve iron level - ferritin.     Schedule home sleep study  After testing, the nurse will call with results and recommendations for plan of treatment   Try changing the timing of zoloft to the am or with first meal of the day  We could consider prazosin for nightmares if sleep apnea is not confirmed.  Avoid going to bed until you feel sleepy/ready for sleep and you may fall asleep more easily.  OR you could start waking up earlier and then you may fall asleep earlier.              Nursing Support:  When:  Monday through Friday 7:00am-5:00pm, except holidays  Where:  Direct phone line is [phone], option 3  If you are having a true emergency, please call 911.  In the event that the line is busy or it is after hours, please leave a voice mail message and we will return your call.  Please speak clearly, leaving your full name, birth date, best number to reach you and the reason for your call.  Medication refills:  We will need the name of the medication, the dosage, the ordering provider, whether you get a 30 day or 90 day refill and the pharmacy name and address.  Medications will be ordered by the providers only.  Nurses cannot call in prescriptions.    To reach the [facility] office staff:  Call [phone], option 1, followed by option 3

## 2024-07-03 ENCOUNTER — HOSPITAL ENCOUNTER (EMERGENCY)
Facility: HOSPITAL | Age: 29
Discharge: HOME | End: 2024-07-04
Attending: EMERGENCY MEDICINE
Payer: COMMERCIAL

## 2024-07-03 ENCOUNTER — APPOINTMENT (EMERGENCY)
Dept: RADIOLOGY | Facility: HOSPITAL | Age: 29
End: 2024-07-03
Payer: COMMERCIAL

## 2024-07-03 DIAGNOSIS — R79.89 ELEVATED TSH: ICD-10-CM

## 2024-07-03 DIAGNOSIS — N39.0 UTI (URINARY TRACT INFECTION): ICD-10-CM

## 2024-07-03 DIAGNOSIS — R07.89 CHEST HEAVINESS: Primary | ICD-10-CM

## 2024-07-03 LAB
ALBUMIN SERPL BCG-MCNC: 4.4 G/DL (ref 3.5–5)
ALP SERPL-CCNC: 64 U/L (ref 34–104)
ALT SERPL W P-5'-P-CCNC: 23 U/L (ref 7–52)
ANION GAP SERPL CALCULATED.3IONS-SCNC: 12 MMOL/L (ref 4–13)
AST SERPL W P-5'-P-CCNC: 13 U/L (ref 13–39)
BASOPHILS # BLD AUTO: 0.03 THOUSANDS/ÂΜL (ref 0–0.1)
BASOPHILS NFR BLD AUTO: 0 % (ref 0–1)
BILIRUB SERPL-MCNC: 0.29 MG/DL (ref 0.2–1)
BUN SERPL-MCNC: 13 MG/DL (ref 5–25)
CALCIUM SERPL-MCNC: 9.7 MG/DL (ref 8.4–10.2)
CARDIAC TROPONIN I PNL SERPL HS: <2 NG/L
CHLORIDE SERPL-SCNC: 104 MMOL/L (ref 96–108)
CO2 SERPL-SCNC: 24 MMOL/L (ref 21–32)
CREAT SERPL-MCNC: 0.76 MG/DL (ref 0.6–1.3)
EOSINOPHIL # BLD AUTO: 0.03 THOUSAND/ÂΜL (ref 0–0.61)
EOSINOPHIL NFR BLD AUTO: 0 % (ref 0–6)
ERYTHROCYTE [DISTWIDTH] IN BLOOD BY AUTOMATED COUNT: 14.5 % (ref 11.6–15.1)
GFR SERPL CREATININE-BSD FRML MDRD: 108 ML/MIN/1.73SQ M
GLUCOSE SERPL-MCNC: 136 MG/DL (ref 65–140)
HCT VFR BLD AUTO: 42 % (ref 34.8–46.1)
HGB BLD-MCNC: 13.3 G/DL (ref 11.5–15.4)
IMM GRANULOCYTES # BLD AUTO: 0.03 THOUSAND/UL (ref 0–0.2)
IMM GRANULOCYTES NFR BLD AUTO: 0 % (ref 0–2)
LYMPHOCYTES # BLD AUTO: 4.09 THOUSANDS/ÂΜL (ref 0.6–4.47)
LYMPHOCYTES NFR BLD AUTO: 31 % (ref 14–44)
MCH RBC QN AUTO: 28 PG (ref 26.8–34.3)
MCHC RBC AUTO-ENTMCNC: 31.7 G/DL (ref 31.4–37.4)
MCV RBC AUTO: 88 FL (ref 82–98)
MONOCYTES # BLD AUTO: 0.69 THOUSAND/ÂΜL (ref 0.17–1.22)
MONOCYTES NFR BLD AUTO: 5 % (ref 4–12)
NEUTROPHILS # BLD AUTO: 8.23 THOUSANDS/ÂΜL (ref 1.85–7.62)
NEUTS SEG NFR BLD AUTO: 64 % (ref 43–75)
NRBC BLD AUTO-RTO: 0 /100 WBCS
PLATELET # BLD AUTO: 263 THOUSANDS/UL (ref 149–390)
PMV BLD AUTO: 10.1 FL (ref 8.9–12.7)
POTASSIUM SERPL-SCNC: 3.5 MMOL/L (ref 3.5–5.3)
PROT SERPL-MCNC: 7.7 G/DL (ref 6.4–8.4)
RBC # BLD AUTO: 4.75 MILLION/UL (ref 3.81–5.12)
SODIUM SERPL-SCNC: 140 MMOL/L (ref 135–147)
WBC # BLD AUTO: 13.1 THOUSAND/UL (ref 4.31–10.16)

## 2024-07-03 PROCEDURE — 99285 EMERGENCY DEPT VISIT HI MDM: CPT | Performed by: EMERGENCY MEDICINE

## 2024-07-03 PROCEDURE — 84443 ASSAY THYROID STIM HORMONE: CPT | Performed by: EMERGENCY MEDICINE

## 2024-07-03 PROCEDURE — 84439 ASSAY OF FREE THYROXINE: CPT | Performed by: EMERGENCY MEDICINE

## 2024-07-03 PROCEDURE — 93005 ELECTROCARDIOGRAM TRACING: CPT

## 2024-07-03 PROCEDURE — 85025 COMPLETE CBC W/AUTO DIFF WBC: CPT | Performed by: EMERGENCY MEDICINE

## 2024-07-03 PROCEDURE — 36415 COLL VENOUS BLD VENIPUNCTURE: CPT | Performed by: EMERGENCY MEDICINE

## 2024-07-03 PROCEDURE — 71045 X-RAY EXAM CHEST 1 VIEW: CPT

## 2024-07-03 PROCEDURE — 80053 COMPREHEN METABOLIC PANEL: CPT | Performed by: EMERGENCY MEDICINE

## 2024-07-03 PROCEDURE — 84484 ASSAY OF TROPONIN QUANT: CPT | Performed by: EMERGENCY MEDICINE

## 2024-07-03 PROCEDURE — 83735 ASSAY OF MAGNESIUM: CPT | Performed by: EMERGENCY MEDICINE

## 2024-07-04 VITALS
OXYGEN SATURATION: 96 % | HEART RATE: 79 BPM | DIASTOLIC BLOOD PRESSURE: 87 MMHG | RESPIRATION RATE: 20 BRPM | TEMPERATURE: 98 F | HEIGHT: 63 IN | BODY MASS INDEX: 49.96 KG/M2 | WEIGHT: 282 LBS | SYSTOLIC BLOOD PRESSURE: 145 MMHG

## 2024-07-04 LAB
BACTERIA UR QL AUTO: ABNORMAL /HPF
BILIRUB UR QL STRIP: NEGATIVE
CARDIAC TROPONIN I PNL SERPL HS: <2 NG/L
CLARITY UR: ABNORMAL
COLOR UR: YELLOW
D DIMER PPP FEU-MCNC: 0.35 UG/ML FEU
EXT PREGNANCY TEST URINE: NEGATIVE
EXT. CONTROL: NORMAL
GLUCOSE UR STRIP-MCNC: NEGATIVE MG/DL
HGB UR QL STRIP.AUTO: NEGATIVE
KETONES UR STRIP-MCNC: NEGATIVE MG/DL
LEUKOCYTE ESTERASE UR QL STRIP: ABNORMAL
MAGNESIUM SERPL-MCNC: 2 MG/DL (ref 1.9–2.7)
NITRITE UR QL STRIP: NEGATIVE
NON-SQ EPI CELLS URNS QL MICRO: ABNORMAL /HPF
PH UR STRIP.AUTO: 5.5 [PH]
PROT UR STRIP-MCNC: ABNORMAL MG/DL
RBC #/AREA URNS AUTO: ABNORMAL /HPF
SP GR UR STRIP.AUTO: >=1.03 (ref 1–1.03)
T4 FREE SERPL-MCNC: 0.66 NG/DL (ref 0.61–1.12)
TSH SERPL DL<=0.05 MIU/L-ACNC: 6.14 UIU/ML (ref 0.45–4.5)
UROBILINOGEN UR STRIP-ACNC: <2 MG/DL
WBC #/AREA URNS AUTO: ABNORMAL /HPF

## 2024-07-04 PROCEDURE — 81001 URINALYSIS AUTO W/SCOPE: CPT | Performed by: EMERGENCY MEDICINE

## 2024-07-04 PROCEDURE — 84484 ASSAY OF TROPONIN QUANT: CPT | Performed by: EMERGENCY MEDICINE

## 2024-07-04 PROCEDURE — 36415 COLL VENOUS BLD VENIPUNCTURE: CPT | Performed by: EMERGENCY MEDICINE

## 2024-07-04 PROCEDURE — 81025 URINE PREGNANCY TEST: CPT | Performed by: EMERGENCY MEDICINE

## 2024-07-04 PROCEDURE — 87086 URINE CULTURE/COLONY COUNT: CPT | Performed by: EMERGENCY MEDICINE

## 2024-07-04 PROCEDURE — 85379 FIBRIN DEGRADATION QUANT: CPT | Performed by: EMERGENCY MEDICINE

## 2024-07-04 RX ORDER — CEFTRIAXONE 1 G/50ML
1000 INJECTION, SOLUTION INTRAVENOUS ONCE
Status: COMPLETED | OUTPATIENT
Start: 2024-07-04 | End: 2024-07-04

## 2024-07-04 RX ORDER — CEPHALEXIN 500 MG/1
500 CAPSULE ORAL EVERY 6 HOURS SCHEDULED
Qty: 28 CAPSULE | Refills: 0 | Status: SHIPPED | OUTPATIENT
Start: 2024-07-04 | End: 2024-07-11

## 2024-07-04 RX ORDER — ONDANSETRON 4 MG/1
4 TABLET, ORALLY DISINTEGRATING ORAL EVERY 8 HOURS PRN
Qty: 20 TABLET | Refills: 0 | Status: SHIPPED | OUTPATIENT
Start: 2024-07-04

## 2024-07-04 RX ORDER — ONDANSETRON 2 MG/ML
4 INJECTION INTRAMUSCULAR; INTRAVENOUS ONCE
Status: COMPLETED | OUTPATIENT
Start: 2024-07-04 | End: 2024-07-04

## 2024-07-04 RX ADMIN — CEFTRIAXONE 1000 MG: 1 INJECTION, SOLUTION INTRAVENOUS at 01:56

## 2024-07-04 RX ADMIN — ONDANSETRON 4 MG: 2 INJECTION INTRAMUSCULAR; INTRAVENOUS at 00:38

## 2024-07-04 NOTE — DISCHARGE INSTRUCTIONS
Plenty of fluids  Zofran for nausea  Start cephalexin on 7/5/24 finish 7 day course  Return with fever, lightheadedness new or worsening chest heaviness throwing up or any new or worsening symptoms

## 2024-07-04 NOTE — ED PROVIDER NOTES
History  Chief Complaint   Patient presents with    Chest Pain     Patient started with chest pain earlier today describes it as a heaviness. 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  Denies any increased dyspnea on exertion she was experiencing some palpitations earlier this evening.  She noted no wheezing.  Patient is concerned about the possibility of POTS syndrome; has had these episodes of sweatiness and shakiness in the past but never associated with the chest heaviness that this brings her in.  Recent cardiology evaluation in May and had an echocardiogram last week which was negative.  Has prior history of pulmonary embolism.  He overall feels improved except for mild nausea  Past Medical history anxiety asthma depression UTIs gestational diabetes  PSHX         Prior to Admission Medications   Prescriptions Last Dose Informant Patient Reported? Taking?   ergocalciferol (VITAMIN D2) 50,000 units 7/3/2024  No Yes   Sig: Take 1 capsule (50,000 Units total) by mouth once a week   sertraline (ZOLOFT) 100 mg tablet 7/3/2024  No Yes   Sig: Take 1 tablet (100 mg total) by mouth daily   sertraline (Zoloft) 50 mg tablet 7/3/2024  No Yes   Sig: Take 1 tablet (50 mg total) by mouth daily       Facility-Administered Medications Last Administration Doses Remaining   cyanocobalamin injection 1,000 mcg 5/15/2024  9:41 AM           Past Medical History:   Diagnosis Date    Anxiety     Asthma     as a child    Depression     Diet controlled gestational diabetes mellitus (GDM) in third trimester 3/3/2021    Miscarriage 2023    Recurrent pregnancy loss 2023    Urinary tract infection        Past Surgical History:   Procedure Laterality Date    ME  DELIVERY ONLY N/A 2021    Procedure:  SECTION ();  Surgeon: Kassandra Martinez MD;  Location: [facility];  Service: Obstetrics       Family History   Problem Relation Age of Onset    Asthma Mother     Diabetes Maternal Grandmother     Hypertension Maternal Grandmother     Thyroid disease Maternal Grandmother     Breast cancer Neg Hx     Colon cancer Neg Hx     Ovarian cancer Neg Hx      I have reviewed and agree with the history as documented.    E-Cigarette/Vaping    E-Cigarette Use Never User      E-Cigarette/Vaping Substances    Nicotine No     THC No     CBD No     Flavoring No     Other No     Unknown No      Social History     Tobacco Use    Smoking status: Never    Smokeless tobacco: Never   Vaping Use    Vaping status: Never Used   Substance Use Topics    Alcohol use: Not Currently    Drug use: Never       Review of Systems   Constitutional:  Positive for activity change and diaphoresis. Negative for appetite change, chills and fever.   HENT:  Negative for congestion, ear pain, rhinorrhea, sneezing and sore throat.    Eyes:  Negative for discharge.   Respiratory:  Positive for shortness of breath. Negative for cough.    Cardiovascular:  Positive for chest pain. Negative for leg swelling.   Gastrointestinal:  Positive for nausea. Negative for abdominal pain, blood in stool, diarrhea and vomiting.   Endocrine: Negative for polyuria.   Genitourinary:  Negative for difficulty urinating, dysuria, frequency and urgency.    Musculoskeletal:  Negative for back pain and myalgias.   Skin:  Negative for rash.   Neurological:  Negative for dizziness, weakness, light-headedness, numbness and headaches.   Hematological:  Negative for adenopathy.   Psychiatric/Behavioral:  Negative for confusion.    All other systems reviewed and are negative.      Physical Exam  Physical Exam  Vitals and nursing note reviewed.   Constitutional:       General: She is not in acute distress.     Appearance: She is not ill-appearing, toxic-appearing or diaphoretic.   HENT:      Head: Normocephalic.      Right Ear: Tympanic membrane and external ear normal.      Left Ear: Tympanic membrane and external ear normal.      Nose: Nose normal. No congestion or rhinorrhea.      Mouth/Throat:      Mouth: Mucous membranes are moist.      Pharynx: No oropharyngeal exudate or posterior oropharyngeal erythema.   Eyes:      General:         Right eye: No discharge.         Left eye: No discharge.      Extraocular Movements: Extraocular movements intact.      Conjunctiva/sclera: Conjunctivae normal.      Pupils: Pupils are equal, round, and reactive to light.   Cardiovascular:      Pulses: Normal pulses.      Heart sounds: Normal heart sounds.   Pulmonary:      Effort: Pulmonary effort is normal. No respiratory distress.      Breath sounds: Normal breath sounds. No stridor. No wheezing, rhonchi or rales.   Abdominal:      General: Bowel sounds are normal. There is no distension.      Palpations: Abdomen is soft.      Tenderness: There is no abdominal tenderness. There is no right CVA tenderness, left CVA tenderness or guarding.   Musculoskeletal:         General: Normal range of motion.      Cervical back: Normal range of motion and neck supple. No rigidity or tenderness.      Right lower leg: No edema.      Left lower leg: No edema.   Lymphadenopathy:      Cervical: No cervical adenopathy.   Skin:     General: Skin is warm and dry.      Capillary Refill: Capillary refill  takes less than 2 seconds.      Findings: No rash.   Neurological:      General: No focal deficit present.      Mental Status: She is alert and oriented to person, place, and time.      Cranial Nerves: No cranial nerve deficit.      Sensory: No sensory deficit.      Motor: No weakness.      Coordination: Coordination normal.      Gait: Gait normal.   Psychiatric:         Mood and Affect: Mood normal.         Vital Signs  ED Triage Vitals [07/03/24 2225]   Temperature Pulse Respirations Blood Pressure SpO2   98 °F (36.7 °C) 91 20 139/85 97 %      Temp Source Heart Rate Source Patient Position - Orthostatic VS BP Location FiO2 (%)   Temporal Monitor Sitting Left arm --      Pain Score       2           Vitals:    07/03/24 2225   BP: 139/85   Pulse: 91   Patient Position - Orthostatic VS: Sitting         Visual Acuity      ED Medications  Medications   ondansetron (ZOFRAN) injection 4 mg (4 mg Intravenous Given 7/4/24 0038)   cefTRIAXone (ROCEPHIN) IVPB (premix in dextrose) 1,000 mg 50 mL (0 mg Intravenous Stopped 7/4/24 0226)       Diagnostic Studies  Results Reviewed       Procedure Component Value Units Date/Time    HS Troponin I 2hr [[id number]] Collected: 07/04/24 0038    Lab Status: Final result Specimen: Blood from Arm, Left Updated: 07/04/24 0120     hs TnI 2hr <2 ng/L      Delta 2hr hsTnI --    UA w Reflex to Microscopic w Reflex to Culture [[id number]]  (Abnormal) Collected: 07/04/24 0038    Lab Status: Final result Specimen: Urine, Clean Catch Updated: 07/04/24 0114     Color, UA Yellow     Clarity, UA Cloudy     Specific Gravity, UA >=1.030     pH, UA 5.5     Leukocytes, UA Moderate     Nitrite, UA Negative     Protein, UA Trace mg/dl      Glucose, UA Negative mg/dl      Ketones, UA Negative mg/dl      Urobilinogen, UA <2.0 mg/dl      Bilirubin, UA Negative     Occult Blood, UA Negative    Urine Microscopic [[id number]]  (Abnormal) Collected: 07/04/24 0038    Lab Status: Final result Specimen: Urine, Clean  Catch Updated: 07/04/24 0114     RBC, UA 0-1 /hpf      WBC, UA 10-20 /hpf      Epithelial Cells Moderate /hpf      Bacteria, UA Innumerable /hpf     Urine culture [[id number]] Collected: 07/04/24 0038    Lab Status: In process Specimen: Urine, Clean Catch Updated: 07/04/24 0114    D-dimer, quantitative [[id number]]  (Normal) Collected: 07/04/24 0038    Lab Status: Final result Specimen: Blood from Arm, Left Updated: 07/04/24 0110     D-Dimer, Quant 0.35 ug/ml FEU     TSH, 3rd generation with Free T4 reflex [[id number]]  (Abnormal) Collected: 07/03/24 2244    Lab Status: Final result Specimen: Blood Updated: 07/04/24 0107     TSH 3RD GENERATON 6.137 uIU/mL     T4, free [[id number]] Collected: 07/03/24 2244    Lab Status: In process Specimen: Blood Updated: 07/04/24 0107    Magnesium [[id number]]  (Normal) Collected: 07/03/24 2244    Lab Status: Final result Specimen: Blood Updated: 07/04/24 0052     Magnesium 2.0 mg/dL     POCT pregnancy, urine [[id number]]  (Normal) Resulted: 07/04/24 0041    Lab Status: Final result Updated: 07/04/24 0041     EXT Preg Test, Ur Negative     Control Valid    HS Troponin 0hr (reflex protocol) [[id number]]  (Normal) Collected: 07/03/24 2244    Lab Status: Final result Specimen: Blood from Arm, Left Updated: 07/03/24 2320     hs TnI 0hr <2 ng/L     Comprehensive metabolic panel [[id number]] Collected: 07/03/24 2244    Lab Status: Final result Specimen: Blood from Arm, Left Updated: 07/03/24 2317     Sodium 140 mmol/L      Potassium 3.5 mmol/L      Chloride 104 mmol/L      CO2 24 mmol/L      ANION GAP 12 mmol/L      BUN 13 mg/dL      Creatinine 0.76 mg/dL      Glucose 136 mg/dL      Calcium 9.7 mg/dL      AST 13 U/L      ALT 23 U/L      Alkaline Phosphatase 64 U/L      Total Protein 7.7 g/dL      Albumin 4.4 g/dL      Total Bilirubin 0.29 mg/dL      eGFR 108 ml/min/1.73sq m     Narrative:      National Kidney Disease Foundation guidelines for Chronic Kidney Disease (CKD):     Stage 1 with normal  or high GFR (GFR > 90 mL/min/1.73 square meters)    Stage 2 Mild CKD (GFR = 60-89 mL/min/1.73 square meters)    Stage 3A Moderate CKD (GFR = 45-59 mL/min/1.73 square meters)    Stage 3B Moderate CKD (GFR = 30-44 mL/min/1.73 square meters)    Stage 4 Severe CKD (GFR = 15-29 mL/min/1.73 square meters)    Stage 5 End Stage CKD (GFR <15 mL/min/1.73 square meters)  Note: GFR calculation is accurate only with a steady state creatinine    CBC and differential [[id number]]  (Abnormal) Collected: 07/03/24 2244    Lab Status: Final result Specimen: Blood from Arm, Left Updated: 07/03/24 2306     WBC 13.10 Thousand/uL      RBC 4.75 Million/uL      Hemoglobin 13.3 g/dL      Hematocrit 42.0 %      MCV 88 fL      MCH 28.0 pg      MCHC 31.7 g/dL      RDW 14.5 %      MPV 10.1 fL      Platelets 263 Thousands/uL      nRBC 0 /100 WBCs      Segmented % 64 %      Immature Grans % 0 %      Lymphocytes % 31 %      Monocytes % 5 %      Eosinophils Relative 0 %      Basophils Relative 0 %      Absolute Neutrophils 8.23 Thousands/µL      Absolute Immature Grans 0.03 Thousand/uL      Absolute Lymphocytes 4.09 Thousands/µL      Absolute Monocytes 0.69 Thousand/µL      Eosinophils Absolute 0.03 Thousand/µL      Basophils Absolute 0.03 Thousands/µL                    XR chest 1 view portable   ED Interpretation by Annabella Bermeo MD (07/04 0009)   Per my independent interpretation. Radiologist to provide formal read. No acute process no PTX                 Procedures  ECG 12 Lead Documentation Only    Date/Time: 7/4/2024 10:43 PM    Performed by: Annabella Bermeo MD  Authorized by: Annabella Bermeo MD    Indications / Diagnosis:  Chest heaviness  Patient location:  ED  Previous ECG:     Previous ECG:  Compared to current    Comparison ECG info:  4/30/23 1114 jxl bradycardia resolved    Similarity:  Changes noted  Rate:     ECG rate:  85    ECG rate assessment: normal    Rhythm:     Rhythm: sinus rhythm    QRS:     QRS axis:   Normal  Comments:       nonspecific st-t changes           ED Course  ED Course as of 07/04/24 0232   Thu Jul 04, 2024   0146 Prior UC show mixed contaminant secondary to leukocytosis recommend ceftriaxone IV  then cephalexin PO ; because of palpitaitions/chest heaviness recommend contact cardiology office to move up followup PMD to followup on TSH reasons for return reviewed             HEART Risk Score      Flowsheet Row Most Recent Value   Heart Score Risk Calculator    History 0 Filed at: 07/04/2024 0104   ECG 1 Filed at: 07/04/2024 0104   Age 0 Filed at: 07/04/2024 0104   Risk Factors 1 Filed at: 07/04/2024 0104   Troponin 0 Filed at: 07/04/2024 0104   HEART Score 2 Filed at: 07/04/2024 0104                          SBIRT 20yo+      Flowsheet Row Most Recent Value   Initial Alcohol Screen: US AUDIT-C     1. How often do you have a drink containing alcohol? 0 Filed at: 07/03/2024 2224   2. How many drinks containing alcohol do you have on a typical day you are drinking?  0 Filed at: 07/03/2024 2224   3a. Male UNDER 65: How often do you have five or more drinks on one occasion? 0 Filed at: 07/03/2024 2224   3b. FEMALE Any Age, or MALE 65+: How often do you have 4 or more drinks on one occassion? 0 Filed at: 07/03/2024 2224   Audit-C Score 0 Filed at: 07/03/2024 2224   ZULEIKA: How many times in the past year have you...    Used an illegal drug or used a prescription medication for non-medical reasons? Never Filed at: 07/03/2024 2224                      Medical Decision Making  Mdm: Patient with prior episodes of shakiness sweatiness but today accompanied by chest heaviness as well as shortness of breath.  She still has some persistent mild nausea.  Patient's EKG does not demonstrate any significant arrhythmia strain pattern initial troponin is negative electrolytes are normal we will check thyroid function test as well as magnesium.  Chest x-ray shows no acute abnormality.  echo from 6/26/2024 was reviewed  as well as Dr. Carmona's cardiology note from 5/8/2024 secondary  to history of possible lupus anticoagulant patient will undergo D-dimer testing.  Treat symptomatic nausea with Zofran    Amount and/or Complexity of Data Reviewed  Labs: ordered.  Radiology: ordered and independent interpretation performed.    Risk  Prescription drug management.             Disposition  Final diagnoses:   Chest heaviness   Elevated TSH   UTI (urinary tract infection)     Time reflects when diagnosis was documented in both MDM as applicable and the Disposition within this note       Time User Action Codes Description Comment    7/4/2024  1:56 AM Annabella Bermeo Add [R07.89] Chest heaviness     7/4/2024  1:56 AM Annabella Bermeo Add [R79.89] Elevated TSH     7/4/2024  1:56 AM Annabella Bermeo Add [N39.0] UTI (urinary tract infection)           ED Disposition       ED Disposition   Discharge    Condition   Stable    Date/Time   u Jul 4, 2024 0156    Comment   Paz Pierre discharge to home/self care.                   Follow-up Information       Follow up With Specialties Details Why Contact Info Additional Information    Denise Baca PA-C Physician Assistant Call in 4 days recheck thyroid [address] [facility] [address]  [phone]       Justa Carmona MD Cardiology, Multidisciplinary Call  move up followup appointment [address] [facility] [address]  [phone]       [facility] Emergency Department Emergency Medicine  If symptoms worsen [address] [facility] [address]  [phone] [facility] Emergency Department, [address], [place], [address]            Patient's Medications   Discharge Prescriptions    CEPHALEXIN (KEFLEX) 500 MG CAPSULE    Take 1 capsule (500 mg total) by mouth every 6 (six) hours for 7 days       Start Date: 7/4/2024  End Date: 7/11/2024       Order Dose: 500 mg       Quantity: 28 capsule    Refills: 0    ONDANSETRON  (ZOFRAN-ODT) 4 MG DISINTEGRATING TABLET    Take 1 tablet (4 mg total) by mouth every 8 (eight) hours as needed for nausea or vomiting for up to 20 doses       Start Date: 7/4/2024  End Date: --       Order Dose: 4 mg       Quantity: 20 tablet    Refills: 0           PDMP Review       None            ED Provider  Electronically Signed by             Annabella Bermeo MD  07/04/24 0235

## 2024-07-05 ENCOUNTER — VBI (OUTPATIENT)
Dept: FAMILY MEDICINE CLINIC | Facility: CLINIC | Age: 29
End: 2024-07-05

## 2024-07-05 NOTE — TELEPHONE ENCOUNTER
07/05/24 10:48 AM    Patient contacted post ED visit, outreach attempt made but message could not be left. Additional outreach attempt will be made.     Thank you.  Timothy Sauceda MA  [facility]

## 2024-07-06 LAB — BACTERIA UR CULT: NORMAL

## 2024-07-08 PROBLEM — G47.33 OSA (OBSTRUCTIVE SLEEP APNEA): Status: ACTIVE | Noted: 2024-07-08

## 2024-07-08 NOTE — TELEPHONE ENCOUNTER
07/08/24 12:16 PM    Patient contacted post ED visit, outreach attempt made but message could not be left. Additional outreach attempt will be made.     Thank you.  Timothy Sauceda MA  [facility]

## 2024-07-09 PROCEDURE — 95806 SLEEP STUDY UNATT&RESP EFFT: CPT | Performed by: INTERNAL MEDICINE

## 2024-07-12 DIAGNOSIS — G47.20 CIRCADIAN RHYTHM SLEEP DISORDER: ICD-10-CM

## 2024-07-12 DIAGNOSIS — G47.39 SLEEP APNEA-LIKE BEHAVIOR: Primary | ICD-10-CM

## 2024-07-12 DIAGNOSIS — G47.19 EXCESSIVE DAYTIME SLEEPINESS: ICD-10-CM

## 2024-07-12 DIAGNOSIS — R29.818 SUSPECTED SLEEP APNEA: ICD-10-CM

## 2024-07-12 DIAGNOSIS — F51.5 NIGHTMARES: ICD-10-CM

## 2024-07-13 LAB
ATRIAL RATE: 85 BPM
P AXIS: 67 DEGREES
PR INTERVAL: 170 MS
QRS AXIS: 60 DEGREES
QRSD INTERVAL: 74 MS
QT INTERVAL: 352 MS
QTC INTERVAL: 418 MS
T WAVE AXIS: 78 DEGREES
VENTRICULAR RATE: 85 BPM

## 2024-07-16 ENCOUNTER — TELEPHONE (OUTPATIENT)
Dept: FAMILY MEDICINE CLINIC | Facility: CLINIC | Age: 29
End: 2024-07-16

## 2024-07-16 NOTE — TELEPHONE ENCOUNTER
Pt called back and is in agreement for a referral to endo   Please send   We do not need to call pt back

## 2024-07-16 NOTE — TELEPHONE ENCOUNTER
Phone call from patient.  She missed call from office.  She is asking for cortisol testing due to sleep issues, trouble focusing, dizziness, nausea, diarrhea, weight gain, flushed face.  I explained that endocrine usually tests this and she is open to a referral.  Please advise.

## 2024-07-16 NOTE — TELEPHONE ENCOUNTER
This is something that is checked by layne. I can certainly refer her, why does she want them checked?

## 2024-07-17 DIAGNOSIS — R63.5 WEIGHT GAIN: Primary | ICD-10-CM

## 2024-07-17 DIAGNOSIS — R23.2 FLUSHING: ICD-10-CM

## 2024-07-18 ENCOUNTER — TELEPHONE (OUTPATIENT)
Dept: FAMILY MEDICINE CLINIC | Facility: CLINIC | Age: 29
End: 2024-07-18

## 2024-07-18 DIAGNOSIS — E11.9 TYPE 2 DIABETES MELLITUS WITHOUT COMPLICATION, WITHOUT LONG-TERM CURRENT USE OF INSULIN (HCC): ICD-10-CM

## 2024-07-18 DIAGNOSIS — E66.01 CLASS 3 SEVERE OBESITY WITH BODY MASS INDEX (BMI) OF 50.0 TO 59.9 IN ADULT, UNSPECIFIED OBESITY TYPE, UNSPECIFIED WHETHER SERIOUS COMORBIDITY PRESENT (HCC): ICD-10-CM

## 2024-07-18 DIAGNOSIS — R73.09 ELEVATED GLUCOSE: Primary | ICD-10-CM

## 2024-07-18 DIAGNOSIS — E28.2 PCOS (POLYCYSTIC OVARIAN SYNDROME): ICD-10-CM

## 2024-07-24 ENCOUNTER — PATIENT MESSAGE (OUTPATIENT)
Dept: SLEEP CENTER | Facility: CLINIC | Age: 29
End: 2024-07-24

## 2024-07-24 ENCOUNTER — TELEPHONE (OUTPATIENT)
Dept: SLEEP CENTER | Facility: CLINIC | Age: 29
End: 2024-07-24

## 2024-07-24 DIAGNOSIS — G47.39 SLEEP APNEA-LIKE BEHAVIOR: Primary | ICD-10-CM

## 2024-07-24 DIAGNOSIS — G47.00 INSOMNIA, UNSPECIFIED TYPE: ICD-10-CM

## 2024-07-24 NOTE — TELEPHONE ENCOUNTER
----- Message from KELL Li sent at 7/12/2024  8:42 AM EDT -----  The home sleep study did not confirm sleep apnea, however, patient reports poor night of sleep and may be false negative results.  Recommend in lab diagnostic sleep study.  Sleep aid could be prescribed for use on the night of the study, if requested and patient has a ride to and from the study.

## 2024-07-24 NOTE — TELEPHONE ENCOUNTER
Per chart, patient is scheduled for in-lab diagnostic study on 12/1/24.    Called patient and left detailed message advising that Alla Warren is wiling to prescribe a sleep aide for her for the night of her in lab study and if requested, she would need a ride to and from the sleep study.  Advised to call back with name of preferred pharmacy if she'd like sleep aide.  Also sent [employer] message with above information.

## 2024-07-25 RX ORDER — ZOLPIDEM TARTRATE 5 MG/1
TABLET ORAL
Qty: 1 TABLET | Refills: 0 | Status: SHIPPED | OUTPATIENT
Start: 2024-07-25

## 2024-07-25 NOTE — TELEPHONE ENCOUNTER
Rx for zolpidem 5mg provided for use on the night of the diagnostic sleep study.  PAPDMP verified and appropriate.

## 2024-07-25 NOTE — TELEPHONE ENCOUNTER
Patient sent [employer] message stating she'd like a sleep aide for her sleep study.  Send to [facility] in [place].  Her  will drive her to and from the study.    Alla, please write Rx.  Thanks.

## 2024-08-21 ENCOUNTER — CONSULT (OUTPATIENT)
Dept: ENDOCRINOLOGY | Facility: CLINIC | Age: 29
End: 2024-08-21
Payer: COMMERCIAL

## 2024-08-21 VITALS
TEMPERATURE: 98.3 F | SYSTOLIC BLOOD PRESSURE: 138 MMHG | HEIGHT: 63 IN | HEART RATE: 80 BPM | WEIGHT: 278.6 LBS | DIASTOLIC BLOOD PRESSURE: 68 MMHG | BODY MASS INDEX: 49.36 KG/M2

## 2024-08-21 DIAGNOSIS — R63.5 WEIGHT GAIN: Primary | ICD-10-CM

## 2024-08-21 DIAGNOSIS — E28.2 PCOS (POLYCYSTIC OVARIAN SYNDROME): ICD-10-CM

## 2024-08-21 DIAGNOSIS — R53.83 OTHER FATIGUE: ICD-10-CM

## 2024-08-21 DIAGNOSIS — E03.8 SUBCLINICAL HYPOTHYROIDISM: ICD-10-CM

## 2024-08-21 DIAGNOSIS — E78.2 MIXED HYPERLIPIDEMIA: ICD-10-CM

## 2024-08-21 DIAGNOSIS — R73.03 PREDIABETES: ICD-10-CM

## 2024-08-21 DIAGNOSIS — N96 RECURRENT PREGNANCY LOSS: ICD-10-CM

## 2024-08-21 DIAGNOSIS — E11.9 TYPE 2 DIABETES MELLITUS WITHOUT COMPLICATION, WITHOUT LONG-TERM CURRENT USE OF INSULIN (HCC): ICD-10-CM

## 2024-08-21 DIAGNOSIS — E66.01 CLASS 3 SEVERE OBESITY WITH BODY MASS INDEX (BMI) OF 50.0 TO 59.9 IN ADULT, UNSPECIFIED OBESITY TYPE, UNSPECIFIED WHETHER SERIOUS COMORBIDITY PRESENT (HCC): ICD-10-CM

## 2024-08-21 DIAGNOSIS — Z87.59 HISTORY OF MISCARRIAGE: ICD-10-CM

## 2024-08-21 DIAGNOSIS — R23.2 FLUSHING: ICD-10-CM

## 2024-08-21 DIAGNOSIS — F32.A ANXIETY AND DEPRESSION: ICD-10-CM

## 2024-08-21 DIAGNOSIS — F41.9 ANXIETY AND DEPRESSION: ICD-10-CM

## 2024-08-21 PROCEDURE — 99204 OFFICE O/P NEW MOD 45 MIN: CPT | Performed by: STUDENT IN AN ORGANIZED HEALTH CARE EDUCATION/TRAINING PROGRAM

## 2024-08-21 NOTE — ASSESSMENT & PLAN NOTE
Patient has been having significant weight gain.  She mentions a concern for Cushing's.  Pathophysiology of Cushing's discussed including its evaluation.  Likelihood of pathologic hypercortisolism seems low however we will proceed with 24-hour urine cortisol measurement for completeness, along with additional metabolic labs.  Patient is on Ozempic which she is presently tolerating which would also help with features of insulin resistance and prediabetes.

## 2024-08-21 NOTE — ASSESSMENT & PLAN NOTE
Labs show mild subclinical hypothyroidism on recent test.  I am requesting repeat thyroid function studies with thyroid antibodies.  There is an association between Hashimoto's and recurrent miscarriage.

## 2024-08-21 NOTE — PATIENT INSTRUCTIONS
INSTRUCTIONS FOR 24 HOUR URINE COLLECTION     The purpose of this test is to measure the total amount of either hormones or minerals that your body excretes into your urine within a 24 hour period.  To do this, you need to collect all the urine that your body makes for 24 hours.     1.        On the first day, when you wake up, note the time.  Then go to the bathroom and urinate.  This should be flushed down the toilet.  It is not part of the collection.     2.        On the first day, all subsequent urine voids need to be saved in the urine jug.  Save the entire amount of each urine void.     3.        When you go to sleep that night, if you happen to awaken throughout the night and early morning, those urine voids must also be saved.     4.        On the second morning, awaken at the same time as you did on the first morning and go to the bathroom and urinate.  Keep this entire urine void.  This is the final part of the collection.       5.        After you have finished the urine collection, keep it cool until you bring it into the laboratory.

## 2024-08-21 NOTE — PROGRESS NOTES
Ambulatory Visit  Name: Paz Pierre      : 1995      MRN: [id number]  Encounter Provider: Kobi Aguero, DO  Encounter Date: 2024   Encounter department: [facility]    Assessment & Plan   1. Weight gain  Assessment & Plan:  Patient has been having significant weight gain.  She mentions a concern for Cushing's.  Pathophysiology of Cushing's discussed including its evaluation.  Likelihood of pathologic hypercortisolism seems low however we will proceed with 24-hour urine cortisol measurement for completeness, along with additional metabolic labs.  Patient is on Ozempic which she is presently tolerating which would also help with features of insulin resistance and prediabetes.  Orders:  -     Ambulatory Referral to Endocrinology  -     Cortisol, Free, Urine, 24 Hour  -     Creatinine, urine, 24 hour  -     Comprehensive metabolic panel  -     Triglycerides  -     Insulin, fasting  -     Sodium, urine, 24 hour  2. Flushing  -     Ambulatory Referral to Endocrinology  3. Subclinical hypothyroidism  -     TSH, 3rd generation  -     T4, free  -     Thyroid Antibodies Panel  4. Prediabetes  -     Triglycerides  -     Insulin, fasting  5. History of miscarriage  6. Other fatigue  Assessment & Plan:  This is likely multifactorial.  Poor sleep quality seems to be a major contributor.  This is being pursued through a diagnostic sleep study which is forthcoming  7. Mixed hyperlipidemia  -     Triglycerides  -     Insulin, fasting  8. Anxiety and depression  Assessment & Plan:  Not controlled. Patient is on SSRI. In addition to addressing lifestyle habits, I may consider a discussion about therapy augmentation with wellbutrin, which may also offer benefit of weight loss support.   9. Recurrent pregnancy loss  Assessment & Plan:  Labs show mild subclinical hypothyroidism on recent test.  I am requesting repeat thyroid function studies with thyroid  antibodies.  There is an association between Hashimoto's and recurrent miscarriage.    RTC 4-6 weeks    History of Present Illness     Paz Pierre is a 28 y.o. female who presents for evaluation of weight gain and flushing. She has concern about cortisol. Reports numerous symptoms, that have been chronic, but worse after daughter born 3 years ago. Both of her parents  from overdose. She noted weight gain is particularly concerning. She notes weight gain over 3 year period. There is history of miscarriages x3 since daughter. Pregnancy was complicated by GDM. She is interested in having additional pregnancy, but wants to wait until health is better. They are using pull-out method. There is remote hx OCP in high school, also depo shot. Periods are regular now, and typically always have been. Don't know much about parents health.     She recently had home sleep study, which was an inadequate study. In December, she will pursue sleep study at [facility]. She does report light sleeping, and intense dreams. She will usually go to bed around 9pm, and will not usually fall asleep until 11:30pm. She will wake up around 830am. There is a TV in room, but patient doesn't use it. She is on phone, but will be on for 30 minutes prior to bed. She did meet with a sleep medicine specialist.     She does take a while to get started in the day. Her lack of energy doesn't seem to sleep.     As far as nutrition, she does not feel she eats bad. She feels like she does not eat enough, and that she is going into starvation mode.     Breakfast will be eggs (omelet, scrambled, etc). She may not have lunch. When she does, it may be tuna SW or leftovers. Dinner she will have a meat and veggie and starch. She will get D&[employer] coffee with added sugar, medium size, never finishes. She will have ice tea occasionally ([employer]).     She is presently on zoloft 150 mg daily. She feels like her mood is wearing off. She feels obsessive  "compulsive, and having bad thoughts that are intrusive thoughts.     She was recently started on ozempic. She had recently started on 0.5 mg weekly dosing. She is reporting less cravings. There is a history of preDM and prior use of metformin, which was c/b diarrhea/bloating.         Review of Systems   Constitutional:  Positive for fatigue (extreme) and unexpected weight change (weight gain).        Low metabolism   Gastrointestinal:         Alternates between diarrhea and constipation   Endocrine: Positive for heat intolerance (always warm).        Low libido   Musculoskeletal:  Positive for myalgias (muscle spasms) and neck pain.   Neurological:  Positive for dizziness and light-headedness.        +shaky   Psychiatric/Behavioral:  Positive for dysphoric mood (depression). The patient is nervous/anxious.         +short term memory loss   All other systems reviewed and are negative.      Objective     /68   Pulse 80   Temp 98.3 °F (36.8 °C)   Ht 5' 3\" (1.6 m)   Wt 126 kg (278 lb 9.6 oz)   BMI 49.35 kg/m²     Physical Exam  Vitals reviewed.   Constitutional:       General: She is not in acute distress.     Appearance: Normal appearance. She is obese.   HENT:      Head: Normocephalic and atraumatic.   Eyes:      General: No scleral icterus.     Conjunctiva/sclera: Conjunctivae normal.   Pulmonary:      Effort: Pulmonary effort is normal. No respiratory distress.   Musculoskeletal:         General: No deformity.      Cervical back: Normal range of motion.   Skin:     General: Skin is warm and dry.   Neurological:      General: No focal deficit present.      Mental Status: She is alert.   Psychiatric:         Mood and Affect: Mood normal.         Behavior: Behavior normal.       Lab Results   Component Value Date    SODIUM 140 07/03/2024    K 3.5 07/03/2024     07/03/2024    CO2 24 07/03/2024    AGAP 12 07/03/2024    BUN 13 07/03/2024    CREATININE 0.76 07/03/2024    GLUC 136 07/03/2024    GLUF 107 (H) " "04/10/2024    CALCIUM 9.7 07/03/2024    AST 13 07/03/2024    ALT 23 07/03/2024    ALKPHOS 64 07/03/2024    TP 7.7 07/03/2024    TBILI 0.29 07/03/2024    EGFR 108 07/03/2024     Lab Results   Component Value Date    HGBA1C 6.2 (H) 06/26/2024     Lab Results   Component Value Date    CHOLESTEROL 190 04/10/2024    CHOLESTEROL 179 02/25/2023    CHOLESTEROL 168 11/06/2020     Lab Results   Component Value Date    HDL 37 (L) 04/10/2024    HDL 37 (L) 02/25/2023    HDL 55 11/06/2020     Lab Results   Component Value Date    TRIG 223 (H) 04/10/2024    TRIG 202 (H) 02/25/2023    TRIG 117 11/06/2020     No results found for: \"NONHDLC\"      Administrative Statements           "

## 2024-08-21 NOTE — ASSESSMENT & PLAN NOTE
Not controlled. Patient is on SSRI. In addition to addressing lifestyle habits, I may consider a discussion about therapy augmentation with wellbutrin, which may also offer benefit of weight loss support.

## 2024-08-21 NOTE — ASSESSMENT & PLAN NOTE
This is likely multifactorial.  Poor sleep quality seems to be a major contributor.  This is being pursued through a diagnostic sleep study which is forthcoming

## 2024-08-26 ENCOUNTER — TELEPHONE (OUTPATIENT)
Dept: FAMILY MEDICINE CLINIC | Facility: CLINIC | Age: 29
End: 2024-08-26

## 2024-08-27 ENCOUNTER — TELEPHONE (OUTPATIENT)
Dept: FAMILY MEDICINE CLINIC | Facility: CLINIC | Age: 29
End: 2024-08-27

## 2024-08-27 NOTE — TELEPHONE ENCOUNTER
So first we have to taper her off of the sertraline. She is currently taking 150 mg daily. I recommend 100 mg daily x 7 days, then 50 mg daily x 7 days, then 25 mg daily x 7 days, then 25 mg every other day x 7 days. So that is going to take a full month to get through that taper. When she is finished the taper, I want her to let me know and will then Rx wellbutrin.

## 2024-08-29 ENCOUNTER — APPOINTMENT (OUTPATIENT)
Dept: LAB | Facility: HOSPITAL | Age: 29
End: 2024-08-29
Payer: COMMERCIAL

## 2024-08-29 ENCOUNTER — HOSPITAL ENCOUNTER (OUTPATIENT)
Dept: ULTRASOUND IMAGING | Facility: HOSPITAL | Age: 29
Discharge: HOME | End: 2024-08-29
Attending: OBSTETRICS & GYNECOLOGY
Payer: COMMERCIAL

## 2024-08-29 DIAGNOSIS — N64.4 PAIN OF LEFT BREAST: ICD-10-CM

## 2024-08-29 LAB
ALBUMIN SERPL BCG-MCNC: 4.3 G/DL (ref 3.5–5)
ALP SERPL-CCNC: 67 U/L (ref 34–104)
ALT SERPL W P-5'-P-CCNC: 31 U/L (ref 7–52)
ANION GAP SERPL CALCULATED.3IONS-SCNC: 11 MMOL/L (ref 4–13)
AST SERPL W P-5'-P-CCNC: 16 U/L (ref 13–39)
BILIRUB SERPL-MCNC: 0.39 MG/DL (ref 0.2–1)
BUN SERPL-MCNC: 12 MG/DL (ref 5–25)
CALCIUM SERPL-MCNC: 8.9 MG/DL (ref 8.4–10.2)
CHLORIDE SERPL-SCNC: 103 MMOL/L (ref 96–108)
CO2 SERPL-SCNC: 23 MMOL/L (ref 21–32)
CREAT SERPL-MCNC: 0.72 MG/DL (ref 0.6–1.3)
GFR SERPL CREATININE-BSD FRML MDRD: 115 ML/MIN/1.73SQ M
GLUCOSE P FAST SERPL-MCNC: 95 MG/DL (ref 65–99)
INSULIN SERPL-ACNC: 27.03 UIU/ML (ref 1.9–23)
POTASSIUM SERPL-SCNC: 3.7 MMOL/L (ref 3.5–5.3)
PROT SERPL-MCNC: 7.4 G/DL (ref 6.4–8.4)
SODIUM SERPL-SCNC: 137 MMOL/L (ref 135–147)
T4 FREE SERPL-MCNC: 0.65 NG/DL (ref 0.61–1.12)
TRIGL SERPL-MCNC: 186 MG/DL
TSH SERPL DL<=0.05 MIU/L-ACNC: 3.44 UIU/ML (ref 0.45–4.5)

## 2024-08-29 PROCEDURE — 83525 ASSAY OF INSULIN: CPT | Performed by: STUDENT IN AN ORGANIZED HEALTH CARE EDUCATION/TRAINING PROGRAM

## 2024-08-29 PROCEDURE — 76642 ULTRASOUND BREAST LIMITED: CPT

## 2024-08-29 PROCEDURE — 80053 COMPREHEN METABOLIC PANEL: CPT | Performed by: STUDENT IN AN ORGANIZED HEALTH CARE EDUCATION/TRAINING PROGRAM

## 2024-08-29 PROCEDURE — 84478 ASSAY OF TRIGLYCERIDES: CPT | Performed by: STUDENT IN AN ORGANIZED HEALTH CARE EDUCATION/TRAINING PROGRAM

## 2024-08-29 PROCEDURE — 84439 ASSAY OF FREE THYROXINE: CPT | Performed by: STUDENT IN AN ORGANIZED HEALTH CARE EDUCATION/TRAINING PROGRAM

## 2024-08-29 PROCEDURE — 86376 MICROSOMAL ANTIBODY EACH: CPT | Performed by: STUDENT IN AN ORGANIZED HEALTH CARE EDUCATION/TRAINING PROGRAM

## 2024-08-29 PROCEDURE — 86800 THYROGLOBULIN ANTIBODY: CPT | Performed by: STUDENT IN AN ORGANIZED HEALTH CARE EDUCATION/TRAINING PROGRAM

## 2024-08-29 PROCEDURE — 36415 COLL VENOUS BLD VENIPUNCTURE: CPT | Performed by: STUDENT IN AN ORGANIZED HEALTH CARE EDUCATION/TRAINING PROGRAM

## 2024-08-29 PROCEDURE — 84443 ASSAY THYROID STIM HORMONE: CPT | Performed by: STUDENT IN AN ORGANIZED HEALTH CARE EDUCATION/TRAINING PROGRAM

## 2024-08-30 ENCOUNTER — TELEPHONE (OUTPATIENT)
Dept: FAMILY MEDICINE CLINIC | Facility: CLINIC | Age: 29
End: 2024-08-30

## 2024-08-30 LAB — THYROGLOB AB SERPL-ACNC: <1 IU/ML (ref 0–0.9)

## 2024-08-30 NOTE — TELEPHONE ENCOUNTER
The only thing we could try is doing 14 days between dose changes instead of 7 days between dose changes.

## 2024-08-31 LAB — THYROPEROXIDASE AB SERPL-ACNC: 14 IU/ML (ref 0–34)

## 2024-09-09 ENCOUNTER — TELEPHONE (OUTPATIENT)
Dept: FAMILY MEDICINE CLINIC | Facility: CLINIC | Age: 29
End: 2024-09-09

## 2024-09-09 DIAGNOSIS — R41.3 SHORT-TERM MEMORY LOSS: Primary | ICD-10-CM

## 2024-09-09 DIAGNOSIS — R42 DIZZINESS: ICD-10-CM

## 2024-09-09 DIAGNOSIS — R41.840 IMPAIRED CONCENTRATION: ICD-10-CM

## 2024-09-22 DIAGNOSIS — E66.01 CLASS 3 SEVERE OBESITY WITH BODY MASS INDEX (BMI) OF 50.0 TO 59.9 IN ADULT, UNSPECIFIED OBESITY TYPE, UNSPECIFIED WHETHER SERIOUS COMORBIDITY PRESENT (HCC): ICD-10-CM

## 2024-09-22 DIAGNOSIS — E66.813 CLASS 3 SEVERE OBESITY WITH BODY MASS INDEX (BMI) OF 50.0 TO 59.9 IN ADULT, UNSPECIFIED OBESITY TYPE, UNSPECIFIED WHETHER SERIOUS COMORBIDITY PRESENT (HCC): ICD-10-CM

## 2024-09-22 DIAGNOSIS — E28.2 PCOS (POLYCYSTIC OVARIAN SYNDROME): ICD-10-CM

## 2024-09-22 DIAGNOSIS — E11.9 TYPE 2 DIABETES MELLITUS WITHOUT COMPLICATION, WITHOUT LONG-TERM CURRENT USE OF INSULIN (HCC): ICD-10-CM

## 2024-09-22 DIAGNOSIS — E55.9 VITAMIN D DEFICIENCY: ICD-10-CM

## 2024-09-23 ENCOUNTER — APPOINTMENT (OUTPATIENT)
Dept: LAB | Facility: HOSPITAL | Age: 29
End: 2024-09-23
Payer: COMMERCIAL

## 2024-09-23 ENCOUNTER — TELEPHONE (OUTPATIENT)
Dept: FAMILY MEDICINE CLINIC | Facility: CLINIC | Age: 29
End: 2024-09-23

## 2024-09-23 DIAGNOSIS — F32.A ANXIETY AND DEPRESSION: Primary | ICD-10-CM

## 2024-09-23 DIAGNOSIS — F41.9 ANXIETY AND DEPRESSION: Primary | ICD-10-CM

## 2024-09-23 DIAGNOSIS — R63.5 ABNORMAL WEIGHT GAIN: ICD-10-CM

## 2024-09-23 LAB
CREAT 24H UR-MRATE: 1.2 G/24HR (ref 0.6–1.8)
PERIOD: 24 HOURS
SODIUM 24H UR-SRATE: 96 MMOL/24 HRS (ref 40–220)
SPECIMEN VOL UR: 750 ML
SPECIMEN VOL UR: 750 ML

## 2024-09-23 RX ORDER — BUPROPION HYDROCHLORIDE 150 MG/1
150 TABLET, EXTENDED RELEASE ORAL 2 TIMES DAILY
Qty: 60 TABLET | Refills: 0 | Status: SHIPPED | OUTPATIENT
Start: 2024-09-23

## 2024-09-23 RX ORDER — ERGOCALCIFEROL 1.25 MG/1
50000 CAPSULE, LIQUID FILLED ORAL WEEKLY
Qty: 12 CAPSULE | Refills: 0 | Status: SHIPPED | OUTPATIENT
Start: 2024-09-23

## 2024-09-27 LAB
CORTIS F 24H UR-MRATE: 14 UG/24 HR (ref 6–42)
CORTIS F UR-MCNC: 18 UG/L

## 2024-09-30 ENCOUNTER — TELEPHONE (OUTPATIENT)
Age: 29
End: 2024-09-30

## 2024-09-30 ENCOUNTER — OFFICE VISIT (OUTPATIENT)
Dept: NEUROLOGY | Facility: CLINIC | Age: 29
End: 2024-09-30
Payer: COMMERCIAL

## 2024-09-30 VITALS
SYSTOLIC BLOOD PRESSURE: 124 MMHG | HEIGHT: 63 IN | TEMPERATURE: 97.8 F | DIASTOLIC BLOOD PRESSURE: 68 MMHG | BODY MASS INDEX: 49.26 KG/M2 | WEIGHT: 278 LBS | HEART RATE: 124 BPM

## 2024-09-30 DIAGNOSIS — R41.3 SHORT-TERM MEMORY LOSS: ICD-10-CM

## 2024-09-30 DIAGNOSIS — R41.840 IMPAIRED CONCENTRATION: Primary | ICD-10-CM

## 2024-09-30 DIAGNOSIS — R42 DIZZINESS: ICD-10-CM

## 2024-09-30 PROCEDURE — 99204 OFFICE O/P NEW MOD 45 MIN: CPT | Performed by: PSYCHIATRY & NEUROLOGY

## 2024-09-30 NOTE — PROGRESS NOTES
Ambulatory Visit  Name: Paz Pierre      : 1995      MRN: [id number]  Encounter Provider: Mae Garcia MD  Encounter Date: 2024   Encounter department: [facility] [place]    Assessment & Plan  Impaired concentration  MOCA . Issues with comprehension since childhood could indicate issues with cognitive processing. Difficulty started after giving birth and gaining a lot of weight, so sleep apnea is certainly a concern as well. Patient has a history of depression and has been on zoloft and now wellbutrin. Given patient's traumatic childhood, severity of depression could certainly contribute. Will also require memory labs. No prior head imaging in workup. Patient may benefit from further cognitive testing.     Consults placed for psychiatry medication management and for talk therapy  Check B12, TSH, Lyme  MRI brain w/o contrast  Neuropsychiatry referral for cognitive evaluation  Follow up with endocrine  Follow up for sleep study  Follow up in 2 months  Orders:    Ambulatory Referral to Neurology    Ambulatory Referral to Hematology / Oncology; Future    AMB E-CONSULT TO PSYCHIATRY    Ambulatory referral to Psych Services; Future    Vitamin B12; Future    TSH w/Reflex; Future    Lyme Total AB W Reflex to IGM/IGG; Future    MRI brain without contrast; Future    Ambulatory referral to Neuropsychology; Future    Short-term memory loss  See impaired concentration above  Orders:    Ambulatory Referral to Neurology    Ambulatory Referral to Hematology / Oncology; Future    AMB E-CONSULT TO PSYCHIATRY    Ambulatory referral to Psych Services; Future    Vitamin B12; Future    TSH w/Reflex; Future    Lyme Total AB W Reflex to IGM/IGG; Future    MRI brain without contrast; Future    Ambulatory referral to Neuropsychology; Future    Dizziness  For tachycardia and palpitations, continue cardiology workup for POTS or other possible causes. May require a different antidepressant as  wellbutrin may exacerbate these symptoms  Orders:    Ambulatory Referral to Neurology    Ambulatory Referral to Hematology / Oncology; Future    AMB E-CONSULT TO PSYCHIATRY    Ambulatory referral to Psych Services; Future    Vitamin B12; Future    TSH w/Reflex; Future    Lyme Total AB W Reflex to IGM/IGG; Future    MRI brain without contrast; Future    Ambulatory referral to Neuropsychology; Future    For family hx of blood clots and 3 miscarriages, hematology referral placed. Lupus anticoagulant results have been inconsistent.      History of Present Illness   29 yo female here to establish care for issues with STM, dizziness, impaired concentration. She had a  3 years ago and started noticing issues after that with her focus and feeling slow to understand and comprehend information. This then progressed over the past year to include dizziness upon standing. She has been seen by cardiology, ob/gyn, and endocrinology. Cardiology is currently working patient up for palpitations and tachycardia as patient has concerns for POTS. Ob/gyn and endocrinology have been working patient up given 3 miscarriages and as she never returned to her baseline prior to her . She gained a lot of weight from her pregnancy and after her mother passed away 4 months after she gave birth. She was on zoloft for depression, which was recently weaned off and transitioned to wellbutrin due to concerns for the weight gain and the possibility that it was contributing to her nightmares. She has poor sleep and never feels well rested. She had an inconclusive home sleep study as the leads came off, but she will have an in person sleep study this December. She feels that there are issues with comprehension that she has had since she was a child, no other issues in school. Both her parents were addicted to drugs so she is not sure if she had any in utero exposure, but she reports a difficult upbringing and needed foster care from 7 to  10 years old. She has been to therapists but it is difficult financially. Overall, she is doing well at her new job but concerned this may impact her work. She feels she loses her train of thought easily and will forget things quickly if she doesn't jot it down on paper. She struggles sometimes to find the right words. No hx of concussion and no self or family history of seizures. Grandmother has had blood clots before.         Review of Systems   Constitutional:  Negative for appetite change, fatigue and fever.   HENT: Negative.  Negative for hearing loss, tinnitus, trouble swallowing and voice change.    Eyes: Negative.  Negative for photophobia, pain and visual disturbance.   Respiratory: Negative.  Negative for shortness of breath.    Cardiovascular: Negative.  Negative for palpitations.   Gastrointestinal: Negative.  Negative for nausea and vomiting.   Endocrine: Negative.  Negative for cold intolerance.   Genitourinary: Negative.  Negative for dysuria, frequency and urgency.   Musculoskeletal:  Negative for back pain, gait problem, myalgias, neck pain and neck stiffness.   Skin: Negative.  Negative for rash.   Allergic/Immunologic: Negative.    Neurological:  Positive for dizziness and light-headedness. Negative for tremors, seizures, syncope, facial asymmetry, speech difficulty, weakness, numbness and headaches.   Hematological: Negative.  Does not bruise/bleed easily.   Psychiatric/Behavioral:  Positive for sleep disturbance. Negative for confusion and hallucinations.         STM started to become an issue after childbirth 2021  Going for a sleep study December   All other systems reviewed and are negative.      I have personally reviewed the MA's review of systems and made changes as necessary.    Current Outpatient Medications on File Prior to Visit   Medication Sig Dispense Refill    buPROPion (WELLBUTRIN SR) 150 mg 12 hr tablet Take 1 tablet (150 mg total) by mouth 2 (two) times a day 60 tablet 0     "ergocalciferol (VITAMIN D2) 50,000 units Take 1 capsule (50,000 Units total) by mouth once a week 12 capsule 0    semaglutide, 0.25 or 0.5 mg/dose, (Ozempic, 0.25 or 0.5 MG/DOSE,) 2 mg/3 mL injection pen Inject 0.75 mL (0.5 mg total) under the skin every 7 days 0.25 mg under the skin every 7 days for 4 doses (28 days), THEN 0.5 mg under the skin every 7 days 9 mL 0     Current Facility-Administered Medications on File Prior to Visit   Medication Dose Route Frequency Provider Last Rate Last Admin    cyanocobalamin injection 1,000 mcg  1,000 mcg Intramuscular Q30 Days Denise Baca PA-C   1,000 mcg at 05/15/24 0941      Objective     /68   Pulse (!) 124   Temp 97.8 °F (36.6 °C)   Ht 5' 3\" (1.6 m)   Wt 126 kg (278 lb)   BMI 49.25 kg/m²     MOCA 26/30    Physical Exam  Eyes:      Extraocular Movements: EOM normal.      Pupils: Pupils are equal, round, and reactive to light.   Neurological:      Mental Status: She is oriented to person, place, and time.      Motor: Motor strength is normal.     Coordination: Finger-Nose-Finger Test and Heel to Shin Test normal.      Deep Tendon Reflexes:      Reflex Scores:       Bicep reflexes are 2+ on the right side and 2+ on the left side.       Brachioradialis reflexes are 2+ on the right side and 2+ on the left side.       Patellar reflexes are 2+ on the right side and 2+ on the left side.  Psychiatric:         Speech: Speech normal.       Neurologic Exam     Mental Status   Oriented to person, place, and time.   Speech: speech is normal   Level of consciousness: alert    Cranial Nerves     CN II   Visual fields full to confrontation.     CN III, IV, VI   Pupils are equal, round, and reactive to light.  Extraocular motions are normal.     CN V   Facial sensation intact.     CN VII   Facial expression full, symmetric.     CN XI   CN XI normal.     CN XII   CN XII normal.     Motor Exam     Strength   Strength 5/5 throughout.     Sensory Exam   Light touch normal.     Gait, " Coordination, and Reflexes     Coordination   Finger to nose coordination: normal  Heel to shin coordination: normal    Tremor   Resting tremor: absent    Reflexes   Right brachioradialis: 2+  Left brachioradialis: 2+  Right biceps: 2+  Left biceps: 2+  Right patellar: 2+  Left patellar: 2+      Results/Data:  I have reviewed the results and images in detail with the patient.

## 2024-09-30 NOTE — TELEPHONE ENCOUNTER
Writer called and left message in regards to the ROF. Writer will email out outside resources when patient returns call. Due to not having a provider to provide the service requested. Writer will close the referral at this time.

## 2024-09-30 NOTE — TELEPHONE ENCOUNTER
Contacted the patient regarding a routine referral to verify service needs and inform pt of the current waitlist. Message left for patient to call back for assistance with being placed on the proper wait list.     Writer also forwarded referral to the referral specialist for Psychology services to review.

## 2024-10-01 ENCOUNTER — TELEPHONE (OUTPATIENT)
Dept: NEUROLOGY | Facility: CLINIC | Age: 29
End: 2024-10-01

## 2024-10-01 DIAGNOSIS — R41.3 MEMORY DIFFICULTY: Primary | ICD-10-CM

## 2024-10-01 RX ORDER — LORAZEPAM 0.5 MG/1
TABLET ORAL
Qty: 1 TABLET | Refills: 0 | Status: SHIPPED | OUTPATIENT
Start: 2024-10-01

## 2024-10-01 NOTE — TELEPHONE ENCOUNTER
----- Message from Mica BUTTS sent at 9/30/2024 12:25 PM EDT -----  Regarding: Meds for claustrophobia  During the check out and MRI scheduling, patient stated that she is claustrophobic and asked if medication can be sent to the pharmacy. Patient is scheduled for MRI for 10/16

## 2024-10-01 NOTE — TELEPHONE ENCOUNTER
Let  pt know rx sent to pharmacy for ativan one tab 30 min prior to mri. Pt will need  to take her for study.

## 2024-10-02 ENCOUNTER — NURSE TRIAGE (OUTPATIENT)
Dept: FAMILY MEDICINE CLINIC | Facility: CLINIC | Age: 29
End: 2024-10-02

## 2024-10-02 ENCOUNTER — OFFICE VISIT (OUTPATIENT)
Dept: ENDOCRINOLOGY | Facility: CLINIC | Age: 29
End: 2024-10-02
Payer: COMMERCIAL

## 2024-10-02 ENCOUNTER — TELEPHONE (OUTPATIENT)
Dept: FAMILY MEDICINE CLINIC | Facility: CLINIC | Age: 29
End: 2024-10-02

## 2024-10-02 VITALS
BODY MASS INDEX: 49.01 KG/M2 | TEMPERATURE: 97.6 F | DIASTOLIC BLOOD PRESSURE: 72 MMHG | SYSTOLIC BLOOD PRESSURE: 120 MMHG | WEIGHT: 276.6 LBS | HEART RATE: 99 BPM | HEIGHT: 63 IN

## 2024-10-02 DIAGNOSIS — E88.819 INSULIN RESISTANCE: ICD-10-CM

## 2024-10-02 DIAGNOSIS — E66.813 CLASS 3 SEVERE OBESITY WITH SERIOUS COMORBIDITY AND BODY MASS INDEX (BMI) OF 45.0 TO 49.9 IN ADULT, UNSPECIFIED OBESITY TYPE (HCC): Primary | ICD-10-CM

## 2024-10-02 DIAGNOSIS — E66.813 CLASS 3 OBESITY: ICD-10-CM

## 2024-10-02 DIAGNOSIS — S39.012A STRAIN OF LUMBAR PARASPINOUS MUSCLE, INITIAL ENCOUNTER: Primary | ICD-10-CM

## 2024-10-02 DIAGNOSIS — K59.00 CONSTIPATION, UNSPECIFIED CONSTIPATION TYPE: ICD-10-CM

## 2024-10-02 DIAGNOSIS — E66.01 CLASS 3 SEVERE OBESITY WITH SERIOUS COMORBIDITY AND BODY MASS INDEX (BMI) OF 45.0 TO 49.9 IN ADULT, UNSPECIFIED OBESITY TYPE (HCC): Primary | ICD-10-CM

## 2024-10-02 DIAGNOSIS — R73.03 PREDIABETES: ICD-10-CM

## 2024-10-02 PROCEDURE — 99213 OFFICE O/P EST LOW 20 MIN: CPT | Performed by: STUDENT IN AN ORGANIZED HEALTH CARE EDUCATION/TRAINING PROGRAM

## 2024-10-02 RX ORDER — CYCLOBENZAPRINE HCL 5 MG
5 TABLET ORAL 3 TIMES DAILY PRN
Qty: 30 TABLET | Refills: 0 | Status: SHIPPED | OUTPATIENT
Start: 2024-10-02

## 2024-10-02 NOTE — ASSESSMENT & PLAN NOTE
Patient pursuing AOM therapy with wellbutrin and ozempic through PCP. We discussed her labs. No evidence of pathologic hypercortisolism. There is hx prediabetes, insulin resistance and hypertriglyceridemia. I advised she may be most benefitted by low carb, healthy fat diet and possibly intermittent fasting/time restricted eating. She will be pursuing diagnostic sleep evaluation in December, which I agree with. She will return care to PCP at this time, but may follow up with me as needed.

## 2024-10-02 NOTE — TELEPHONE ENCOUNTER
Outbound call placed to patient.     Informed pt that medication was sent to pt's pharmacy to take 30 minutes before MRI on 10/16/24. Informed pt that she will need a  that day due to the medication. Pt verbalized understanding and had no further questions at this time.

## 2024-10-02 NOTE — TELEPHONE ENCOUNTER
Inbound call received from Patient if she could reschedule her MRI at [place] as she heard they have a bigger MRI.     Patient also asked if she could be scheduled for her follow up with Lyndsay COVINGTON on 12/02/2024 instead of 12/03/2024 as she lives an hour away and will be in the area 12/02/2024 after her sleep study. No appointments were available for 12/02/2024 and she was placed on a wait list with the request for 12/02/2024 called out.     Patient given central scheduling phone number [phone] and warm transferred to Karma at central scheduling for further assistance.     Neurology Clerical: Please be aware Adrien is specifically requesting 12/02/2024 for her wait list request. Thank you!

## 2024-10-02 NOTE — TELEPHONE ENCOUNTER
I increased dose of ozempic for her. I also sent in Linzess for constipation, it is a daily medication to try. I also sent in a muscle relaxer for her back.

## 2024-10-02 NOTE — PROGRESS NOTES
"Ambulatory Visit  Name: Paz Pierre      : 1995      MRN: [id number]  Encounter Provider: Kobi Aguero DO  Encounter Date: 10/2/2024   Encounter department: [facility] DIABETES [facility]OCRINOLOG[facility] [place]    Assessment & Plan  Class 3 severe obesity with serious comorbidity and body mass index (BMI) of 45.0 to 49.9 in adult, unspecified obesity type (HCC)  Patient pursuing AOM therapy with wellbutrin and ozempic through PCP. We discussed her labs. No evidence of pathologic hypercortisolism. There is hx prediabetes, insulin resistance and hypertriglyceridemia. I advised she may be most benefitted by low carb, healthy fat diet and possibly intermittent fasting/time restricted eating. She will be pursuing diagnostic sleep evaluation in December, which I agree with. She will return care to PCP at this time, but may follow up with me as needed.       Prediabetes         Insulin resistance         RTC PRN    History of Present Illness     Paz Pierre is a 28 y.o. female who presents in follow up of weight gain and flushing. She had requested endocrine evaluation for hypercortisolism. A 24h urine cortisol completed was wnl. She uses AOM for weight loss assistance. She is on wellbutrin  mg total daily in split dosing. She is also on ozempic 0.5 mg weekly, both initiated by PCP. She was initiated in early August.     Sleep study forthcoming in 2024.       Review of Systems   Constitutional:  Positive for unexpected weight change.   Gastrointestinal:  Positive for constipation.   Endocrine: Negative for polydipsia and polyuria.   Neurological:  Positive for dizziness and light-headedness.   All other systems reviewed and are negative.          Objective     Temp 97.6 °F (36.4 °C)   Ht 5' 3\" (1.6 m)   Wt 125 kg (276 lb 9.6 oz)   BMI 49.00 kg/m²     Physical Exam  Vitals reviewed.   Constitutional:       General: She is not in acute distress.     Appearance: Normal " appearance.   HENT:      Head: Normocephalic and atraumatic.   Eyes:      General: No scleral icterus.     Conjunctiva/sclera: Conjunctivae normal.   Pulmonary:      Effort: Pulmonary effort is normal. No respiratory distress.   Musculoskeletal:         General: No deformity.      Cervical back: Normal range of motion.   Neurological:      General: No focal deficit present.      Mental Status: She is alert.   Psychiatric:         Mood and Affect: Mood normal.         Behavior: Behavior normal.         Component      Latest Ref Rng 8/29/2024 9/23/2024   Sodium      135 - 147 mmol/L 137     Potassium      3.5 - 5.3 mmol/L 3.7     Chloride      96 - 108 mmol/L 103     Carbon Dioxide      21 - 32 mmol/L 23     ANION GAP      4 - 13 mmol/L 11     BUN      5 - 25 mg/dL 12     Creatinine      0.60 - 1.30 mg/dL 0.72     GLUCOSE, FASTING      65 - 99 mg/dL 95     Calcium      8.4 - 10.2 mg/dL 8.9     AST      13 - 39 U/L 16     ALT      7 - 52 U/L 31     ALK PHOS      34 - 104 U/L 67     Total Protein      6.4 - 8.4 g/dL 7.4     Albumin      3.5 - 5.0 g/dL 4.3     Total Bilirubin      0.20 - 1.00 mg/dL 0.39     GFR, Calculated      ml/min/1.73sq m 115     SODIUM 24 HOUR URINE      40 - 220 mmol/24 hrs  96    24 HR URINE VOLUME      mL  750    PERIOD      Hours  24    CORTISOL,F,UG/L,U      Undefined ug/L  18    CORTISOL 24H URINARY FREE      6 - 42 ug/24 hr  14    Creatinine, Urine      0.6 - 1.8 g/24Hr  1.2    Urine Total Volume      ml  750    TSH 3RD GENERATON      0.450 - 4.500 uIU/mL 3.445     FREE T4      0.61 - 1.12 ng/dL 0.65     Triglycerides      See Comment mg/dL 186 (H)     INSULIN, FASTING      1.90 - 23.00 uIU/mL 27.03 (H)     THYROID MICROSOMAL ANTIBODY      0 - 34 IU/mL 14     THYROGLOBULIN AB      0.0 - 0.9 IU/mL <1.0        Lab Results   Component Value Date    HGBA1C 6.2 (H) 06/26/2024

## 2024-10-03 DIAGNOSIS — R00.0 SINUS TACHYCARDIA: Primary | ICD-10-CM

## 2024-10-03 DIAGNOSIS — R42 DIZZINESS: ICD-10-CM

## 2024-10-03 DIAGNOSIS — R00.2 PALPITATIONS: ICD-10-CM

## 2024-10-16 ENCOUNTER — HOSPITAL ENCOUNTER (OUTPATIENT)
Dept: MRI IMAGING | Facility: HOSPITAL | Age: 29
Discharge: HOME | End: 2024-10-16
Payer: COMMERCIAL

## 2024-10-16 ENCOUNTER — APPOINTMENT (OUTPATIENT)
Dept: LAB | Facility: HOSPITAL | Age: 29
End: 2024-10-16
Payer: COMMERCIAL

## 2024-10-16 DIAGNOSIS — R41.840 IMPAIRED CONCENTRATION: ICD-10-CM

## 2024-10-16 DIAGNOSIS — R41.3 SHORT-TERM MEMORY LOSS: ICD-10-CM

## 2024-10-16 DIAGNOSIS — R42 DIZZINESS: ICD-10-CM

## 2024-10-16 DIAGNOSIS — R73.09 ELEVATED GLUCOSE: ICD-10-CM

## 2024-10-16 LAB
B BURGDOR IGG+IGM SER QL IA: NEGATIVE
TSH SERPL DL<=0.05 MIU/L-ACNC: 2.67 UIU/ML (ref 0.45–4.5)

## 2024-10-16 PROCEDURE — 84443 ASSAY THYROID STIM HORMONE: CPT

## 2024-10-16 PROCEDURE — 70551 MRI BRAIN STEM W/O DYE: CPT

## 2024-10-16 PROCEDURE — 86618 LYME DISEASE ANTIBODY: CPT

## 2024-10-16 PROCEDURE — 36415 COLL VENOUS BLD VENIPUNCTURE: CPT

## 2024-10-24 ENCOUNTER — TELEPHONE (OUTPATIENT)
Dept: FAMILY MEDICINE CLINIC | Facility: CLINIC | Age: 29
End: 2024-10-24

## 2024-10-24 ENCOUNTER — OFFICE VISIT (OUTPATIENT)
Dept: OBGYN CLINIC | Facility: CLINIC | Age: 29
End: 2024-10-24
Payer: COMMERCIAL

## 2024-10-24 ENCOUNTER — TELEPHONE (OUTPATIENT)
Age: 29
End: 2024-10-24

## 2024-10-24 VITALS
HEIGHT: 63 IN | WEIGHT: 276.8 LBS | SYSTOLIC BLOOD PRESSURE: 114 MMHG | DIASTOLIC BLOOD PRESSURE: 72 MMHG | BODY MASS INDEX: 49.04 KG/M2

## 2024-10-24 DIAGNOSIS — N96 RECURRENT PREGNANCY LOSS: Primary | ICD-10-CM

## 2024-10-24 DIAGNOSIS — D68.62 LUPUS ANTICOAGULANT DISORDER (HCC): ICD-10-CM

## 2024-10-24 PROCEDURE — 99214 OFFICE O/P EST MOD 30 MIN: CPT | Performed by: OBSTETRICS & GYNECOLOGY

## 2024-10-24 NOTE — PATIENT COMMUNICATION
Pt reports she is still having constipation and nausea on the linzes . Pt stopped taking the medication and stated it really didn't help. Pt reports she gets some abd cramping that comes and goes with bowel movements. Pt does go but not a lot and its more in a diarrhea consistency.     Pt is going to have a psych start to manage her depression medication, but she can't get established with one for while. Pt is just concerned and wants to get a medication that helps her depression but doesn't cause these GI issues. Pt also was wondering if she needs to see GI specialist or not.    Pt expressed wanting an appt to further discuss a plan and triage nurse attempted to make pt an appt, but nothing was available. Triage nurse called the office and was informed PCPs are at a conference, and the remaining PA's are booked.  Staff stated they will message the provider to see if she can help assist pt int he meantime. Triage nurse educated pt if any of the s/s worsen please go to urgent care or ER for further medical assistance.   PCP please further advise.

## 2024-10-24 NOTE — TELEPHONE ENCOUNTER
Patient called in as she informed she recently seen Dr Garcia 9/30/24. She was provided with many referrals, 1 of which is Psychiatry. She is aware those offices are hard to get in with and wants to know if our provider would be the one to provide up regarding her wellbutrin, if she feels its not effective for her? She informed her PCP prescribed her Zoloft and was on it for 3 years , but due to side effects she was switched to welbutrin , but it has caused increase in constipation. Due to the constipation, PCP prescribed Linzess, which now has caused her nausea and she still is constipated. This is causing patients anxiety and depression to be increased due to her symptoms,  as she feels nothing is helping her with her depression or constipation.     I recommend patient call her PCP office to inform them of her symptoms re: Wellbutrin causing increase constipation and the Linzess not relieving her constipation and causing Nauseaness. Patient aware her anxiety and depression does not help either. Her PCP will be able to provide recommendations until seen by a Psychiatrist.    Due to patient's referral of Psychiatry,  I informed patient she can call her insurance company to see which locations she can go to, and call them as well as [facility], if she needs to wait for appts that may be months out. She can schedule with more than 1 location if needed and ask to be put on wait list, to see who she can get in with first, if she has any difficulty getting an appt in Psychiatry.     Patient was very appreciative of the guidance. She will call her PCP office today to discuss her concerns (Wellbutrin and Linzess) and da an appt if needed per her symptoms, to see if there is any changes that may be needed for her meds and symptoms. She will also work on getting a psychiatric appt scheduled as well. Patient has no further concerns or questions at this time, if she does she will reach out.

## 2024-10-24 NOTE — PROGRESS NOTES
Assessment:  28 y.o.  who presents as a follow up of RPL.    Plan:  Diagnoses and all orders for this visit:    Recurrent pregnancy loss  Lupus anticoagulant disorder (HCC)    - Discussed evaluation to date, including abnormal LA testing  - Discussed option for lovenox while TTC, as well as inconclusive data on this intervention and risks  - Appreciate hematology evaluation; continue as planned  - Discussed ongoing lifestyle modifications     __________________________________________________________________    Subjective   Paz Naun Pierre is a 28 y.o.  who presents to review options for RPL.     Stopped zoloft and many of her symptoms resolved. Had been on it since birth of her daughter and initially was not suspicious this would be the source, but noticed marked improvement when stopping. Using Wellbutrin now, better but similar side effects. Following with neurology for dizziness, nausea. Testing benign to date. Also on Ozempic for last 6wk. Met deducible for the year and this intervention is affordable now, but unsure if would be feasible to continue in long-term. Does not note side effect changes since starting this. Did great at 0.25-0.5mg dosing, started wellbutrin when increased to 0.75mg dosing, so unsure which is more likely to be responsible for symptoms. Not presently TTC. Getting on top of symptoms/ongoing evals first.     Reviewed testing to date. Only acute abnormality appreciated is ?positive LA testing. Discussed unclear if reflects positive testing and most recent eval negative. Planned f/u with Hematology. We discussed contribution of LA testing to blood clotting risk, and option of anticoagulation. Discussed poor data to support this intervention, but in absence of other likely etiology it would be reasonable to consider attempt. Discussed measures for lifestyle changes, which may help managed insulin resistance. Also unclear contribution of this, but optimizing maternal  "medical status is a positive change for both TTC/early pregnancy and long term pregnancy risk.         The following portions of the patient's history were reviewed and updated as appropriate: allergies, current medications, past medical history, past social history, past surgical history, and problem list.    Review of Systems  Review of Systems   Constitutional:  Negative for chills, fatigue and fever.   Respiratory:  Negative for shortness of breath.    Cardiovascular:  Negative for chest pain and palpitations.   Gastrointestinal:  Positive for nausea. Negative for abdominal distention, abdominal pain, constipation, diarrhea and vomiting.   Genitourinary:  Negative for dysuria, flank pain, menstrual problem, pelvic pain, vaginal bleeding, vaginal discharge and vaginal pain.   Skin:  Negative for rash and wound.   Neurological:  Positive for dizziness. Negative for headaches.            Objective  /72   Ht 5' 3\" (1.6 m)   Wt 126 kg (276 lb 12.8 oz)   LMP 10/08/2024 (Exact Date)   BMI 49.03 kg/m²      Physical Exam:  Physical Exam  Exam conducted with a chaperone present.   Constitutional:       General: She is not in acute distress.     Appearance: Normal appearance. She is not ill-appearing, toxic-appearing or diaphoretic.   Eyes:      General: No scleral icterus.        Right eye: No discharge.         Left eye: No discharge.      Conjunctiva/sclera: Conjunctivae normal.   Cardiovascular:      Rate and Rhythm: Normal rate.   Pulmonary:      Effort: Pulmonary effort is normal. No respiratory distress.   Abdominal:      General: There is no distension.      Palpations: There is no mass.      Tenderness: There is no abdominal tenderness. There is no guarding or rebound.      Hernia: No hernia is present.   Genitourinary:     General: Normal vulva.      Exam position: Lithotomy position.      Labia:         Right: No rash, tenderness or lesion.         Left: No rash, tenderness or lesion.       Urethra: No " prolapse, urethral swelling or urethral lesion.      Vagina: No signs of injury. No vaginal discharge, erythema, tenderness or bleeding.      Cervix: No cervical motion tenderness, friability, lesion, erythema or cervical bleeding.      Uterus: Not deviated, not enlarged, not fixed and not tender.       Adnexa:         Right: No mass, tenderness or fullness.          Left: No mass, tenderness or fullness.     Musculoskeletal:         General: No swelling.   Skin:     General: Skin is warm and dry.      Coloration: Skin is not jaundiced or pale.      Findings: No bruising or erythema.   Neurological:      Mental Status: She is alert.   Psychiatric:         Mood and Affect: Mood normal.         Behavior: Behavior normal.         Thought Content: Thought content normal.         Judgment: Judgment normal.           Lab Review  Lupus anticoagulant, b2 glucoprotein, anticardiolipin ab (10/2023, 4/2024, 6/2024)  TSH (10/2024, 8/2024)  Insulin, T4, Thyroid Ab (8/2024)

## 2024-11-01 ENCOUNTER — RA CDI HCC (OUTPATIENT)
Dept: OTHER | Facility: HOSPITAL | Age: 29
End: 2024-11-01

## 2024-11-07 ENCOUNTER — OFFICE VISIT (OUTPATIENT)
Dept: FAMILY MEDICINE CLINIC | Facility: CLINIC | Age: 29
End: 2024-11-07
Payer: COMMERCIAL

## 2024-11-07 VITALS
RESPIRATION RATE: 18 BRPM | DIASTOLIC BLOOD PRESSURE: 76 MMHG | OXYGEN SATURATION: 96 % | HEART RATE: 101 BPM | TEMPERATURE: 98.2 F | BODY MASS INDEX: 49.54 KG/M2 | SYSTOLIC BLOOD PRESSURE: 116 MMHG | WEIGHT: 279.6 LBS | HEIGHT: 63 IN

## 2024-11-07 DIAGNOSIS — F32.A ANXIETY AND DEPRESSION: Primary | ICD-10-CM

## 2024-11-07 DIAGNOSIS — E66.813 CLASS 3 OBESITY: ICD-10-CM

## 2024-11-07 DIAGNOSIS — K30 INDIGESTION: ICD-10-CM

## 2024-11-07 DIAGNOSIS — F41.9 ANXIETY AND DEPRESSION: Primary | ICD-10-CM

## 2024-11-07 DIAGNOSIS — R73.09 ELEVATED GLUCOSE: ICD-10-CM

## 2024-11-07 PROCEDURE — 99213 OFFICE O/P EST LOW 20 MIN: CPT | Performed by: PHYSICIAN ASSISTANT

## 2024-11-07 RX ORDER — BUPROPION HYDROCHLORIDE 150 MG/1
150 TABLET, EXTENDED RELEASE ORAL 2 TIMES DAILY
Qty: 60 TABLET | Refills: 0 | Status: SHIPPED | OUTPATIENT
Start: 2024-11-07

## 2024-11-07 NOTE — ASSESSMENT & PLAN NOTE
Decrease Ozempic to 0.5 mg weekly due to GI side effects at 1 mg dosing.    Orders:    semaglutide, 0.25 or 0.5 mg/dose, (Ozempic, 0.25 or 0.5 MG/DOSE,) 2 mg/3 mL injection pen; Inject 0.75 mL (0.5 mg total) under the skin every 7 days

## 2024-11-07 NOTE — PROGRESS NOTES
Ambulatory Visit  Name: Paz Pierre      : 1995      MRN: [id number]  Encounter Provider: Denise Baca PA-C  Encounter Date: 2024   Encounter department: [facility]    Assessment & Plan  Anxiety and depression  Depression Screening Follow-up Plan: Patient's depression screening was positive with a PHQ-9 score of 13. Patient with underlying depression and was advised to continue current medications as prescribed. Patient assessed for underlying major depression. They have no active suicidal ideations. Brief counseling provided and recommend additional follow-up/re-evaluation next office visit.    Orders:    buPROPion (WELLBUTRIN SR) 150 mg 12 hr tablet; Take 1 tablet (150 mg total) by mouth 2 (two) times a day    Indigestion    Side effect of Ozempic. Decrease Ozempic to lower dose as patient felt better at 0.5 mg weekly.         Class 3 obesity  Decrease Ozempic to 0.5 mg weekly due to GI side effects at 1 mg dosing.    Orders:    semaglutide, 0.25 or 0.5 mg/dose, (Ozempic, 0.25 or 0.5 MG/DOSE,) 2 mg/3 mL injection pen; Inject 0.75 mL (0.5 mg total) under the skin every 7 days    Elevated glucose    Continue Ozempic at 0.5 mg weekly.    Orders:    semaglutide, 0.25 or 0.5 mg/dose, (Ozempic, 0.25 or 0.5 MG/DOSE,) 2 mg/3 mL injection pen; Inject 0.75 mL (0.5 mg total) under the skin every 7 days      Depression Screening and Follow-up Plan: Patient's depression screening was positive with a PHQ-9 score of 13.       History of Present Illness     Paz is here today to discuss GI side effects of Ozempic. Side effects are worse the day after injection, slowly improves as week goes on.           Review of Systems   Constitutional:  Negative for chills and fever.   HENT:  Negative for ear pain and sore throat.    Eyes:  Negative for pain and visual disturbance.   Respiratory:  Negative for cough and shortness of breath.    Cardiovascular:  Negative for chest pain and palpitations.  "  Gastrointestinal:  Positive for constipation, diarrhea and nausea. Negative for abdominal pain and vomiting.   Genitourinary:  Negative for dysuria and hematuria.   Musculoskeletal:  Negative for arthralgias and back pain.   Skin:  Negative for color change and rash.   Neurological:  Negative for seizures and syncope.   All other systems reviewed and are negative.          Objective     /76 (BP Location: Left arm, Patient Position: Sitting, Cuff Size: Standard)   Pulse 101   Temp 98.2 °F (36.8 °C) (Temporal)   Resp 18   Ht 5' 3\" (1.6 m)   Wt 127 kg (279 lb 9.6 oz)   LMP 10/08/2024 (Exact Date)   SpO2 96%   BMI 49.53 kg/m²     Physical Exam  Vitals reviewed.   Constitutional:       General: She is not in acute distress.     Appearance: She is well-developed. She is obese. She is not diaphoretic.   HENT:      Head: Normocephalic and atraumatic.      Right Ear: Hearing, tympanic membrane, ear canal and external ear normal.      Left Ear: Hearing, tympanic membrane, ear canal and external ear normal.      Nose: Nose normal.      Mouth/Throat:      Mouth: Mucous membranes are moist.      Pharynx: Oropharynx is clear. Uvula midline. No oropharyngeal exudate.   Eyes:      General: No scleral icterus.        Right eye: No discharge.         Left eye: No discharge.      Conjunctiva/sclera: Conjunctivae normal.   Neck:      Thyroid: No thyromegaly.      Vascular: No carotid bruit.   Cardiovascular:      Rate and Rhythm: Normal rate and regular rhythm.      Heart sounds: Normal heart sounds. No murmur heard.  Pulmonary:      Effort: Pulmonary effort is normal. No respiratory distress.      Breath sounds: Normal breath sounds. No wheezing.   Abdominal:      General: Bowel sounds are normal. There is no distension.      Palpations: Abdomen is soft. There is no mass.      Tenderness: There is no abdominal tenderness. There is no guarding or rebound.   Musculoskeletal:         General: No tenderness. Normal range " of motion.      Cervical back: Neck supple.   Lymphadenopathy:      Cervical: No cervical adenopathy.   Skin:     General: Skin is warm and dry.      Findings: No erythema or rash.   Neurological:      Mental Status: She is alert and oriented to person, place, and time.   Psychiatric:         Behavior: Behavior normal.         Thought Content: Thought content normal.         Judgment: Judgment normal.

## 2024-11-07 NOTE — ASSESSMENT & PLAN NOTE
Depression Screening Follow-up Plan: Patient's depression screening was positive with a PHQ-9 score of 13. Patient with underlying depression and was advised to continue current medications as prescribed. Patient assessed for underlying major depression. They have no active suicidal ideations. Brief counseling provided and recommend additional follow-up/re-evaluation next office visit.    Orders:    buPROPion (WELLBUTRIN SR) 150 mg 12 hr tablet; Take 1 tablet (150 mg total) by mouth 2 (two) times a day

## 2024-11-14 ENCOUNTER — APPOINTMENT (OUTPATIENT)
Dept: LAB | Facility: MEDICAL CENTER | Age: 29
End: 2024-11-14
Payer: COMMERCIAL

## 2024-11-14 ENCOUNTER — TELEPHONE (OUTPATIENT)
Age: 29
End: 2024-11-14

## 2024-11-14 DIAGNOSIS — R73.09 ELEVATED GLUCOSE: ICD-10-CM

## 2024-11-14 LAB
EST. AVERAGE GLUCOSE BLD GHB EST-MCNC: 120 MG/DL
HBA1C MFR BLD: 5.8 %
VIT B12 SERPL-MCNC: 956 PG/ML (ref 180–914)

## 2024-11-14 PROCEDURE — 83036 HEMOGLOBIN GLYCOSYLATED A1C: CPT

## 2024-11-14 PROCEDURE — 82607 VITAMIN B-12: CPT

## 2024-11-15 ENCOUNTER — TELEPHONE (OUTPATIENT)
Dept: NEUROLOGY | Facility: CLINIC | Age: 29
End: 2024-11-15

## 2024-11-15 ENCOUNTER — PATIENT MESSAGE (OUTPATIENT)
Dept: NEUROLOGY | Facility: CLINIC | Age: 29
End: 2024-11-15

## 2024-11-15 ENCOUNTER — RESULTS FOLLOW-UP (OUTPATIENT)
Dept: FAMILY MEDICINE CLINIC | Facility: CLINIC | Age: 29
End: 2024-11-15

## 2024-11-15 NOTE — TELEPHONE ENCOUNTER
Let pt know if recent intake with vit b12, relationship of lab draw to dosing can also affect level.  If pt is on oral supplement this can also bump up level.  Level of 956 is actually not toxic.  Typically we aim between 400 and 1000 for levels.  Follow up with pcp for any additional concerns.

## 2024-11-15 NOTE — TELEPHONE ENCOUNTER
Recommend pt to share this with pcp as well. It appears that the oral supplement is helping a lot.

## 2024-11-15 NOTE — PATIENT COMMUNICATION
11/15/24 Mae Garcia MD    Let pt know if recent intake with vit b12, relationship of lab draw to dosing can also affect level.  If pt is on oral supplement this can also bump up level.  Level of 956 is actually not toxic.  Typically we aim between 400 and 1000 for levels.  Follow up with pcp for any additional concerns.     Recommend pt to share this with pcp as well. It appears that the oral supplement is helping a lot.

## 2024-11-25 ENCOUNTER — TELEPHONE (OUTPATIENT)
Dept: NEUROLOGY | Facility: CLINIC | Age: 29
End: 2024-11-25

## 2024-11-25 NOTE — TELEPHONE ENCOUNTER
I called and left a voicemail message reminding the patient of there upcoming appointment with Lyndsay Tamayo PA-C on 12/3/24 @ 8 am in the [place] Office. I requested a call back to our office to confirm the appointment or if the patient has any issues or concerns or cannot keep this appointment.

## 2024-12-01 ENCOUNTER — HOSPITAL ENCOUNTER (OUTPATIENT)
Dept: SLEEP CENTER | Facility: CLINIC | Age: 29
Discharge: HOME | End: 2024-12-01
Payer: COMMERCIAL

## 2024-12-01 ENCOUNTER — PATIENT MESSAGE (OUTPATIENT)
Dept: NEUROLOGY | Facility: CLINIC | Age: 29
End: 2024-12-01

## 2024-12-01 DIAGNOSIS — F51.5 NIGHTMARES: ICD-10-CM

## 2024-12-01 DIAGNOSIS — G47.39 SLEEP APNEA-LIKE BEHAVIOR: ICD-10-CM

## 2024-12-01 DIAGNOSIS — R29.818 SUSPECTED SLEEP APNEA: ICD-10-CM

## 2024-12-01 DIAGNOSIS — G47.20 CIRCADIAN RHYTHM SLEEP DISORDER: ICD-10-CM

## 2024-12-01 DIAGNOSIS — G47.19 EXCESSIVE DAYTIME SLEEPINESS: ICD-10-CM

## 2024-12-01 PROCEDURE — 95810 POLYSOM 6/> YRS 4/> PARAM: CPT | Performed by: PSYCHIATRY & NEUROLOGY

## 2024-12-01 PROCEDURE — 95810 POLYSOM 6/> YRS 4/> PARAM: CPT

## 2024-12-02 ENCOUNTER — TELEPHONE (OUTPATIENT)
Dept: NEUROLOGY | Facility: CLINIC | Age: 29
End: 2024-12-02

## 2024-12-02 ENCOUNTER — PATIENT MESSAGE (OUTPATIENT)
Dept: NEUROLOGY | Facility: CLINIC | Age: 29
End: 2024-12-02

## 2024-12-02 DIAGNOSIS — O03.9 MISCARRIAGE: Primary | ICD-10-CM

## 2024-12-02 NOTE — TELEPHONE ENCOUNTER
I will place a thrombosis panel for pt in emr. Pt should be following up for appt as initially directed to ensure evaluation is completed.  Pt only seen as initial appt.  Rec follow up appt with AP team. Send copy of mri to pcp as well.  Pt should alert pcp of miscarriage history as well for further direction.

## 2024-12-02 NOTE — TELEPHONE ENCOUNTER
[employer] message sent to patient. See 11/30/24 patient message encounter for further documentation.     MRI report and note sent to PCP per provider request.

## 2024-12-02 NOTE — TELEPHONE ENCOUNTER
Inbound call received from patient.  Patient reported that she does not have a history of concussion and stated she only has headaches one in a while. Stated typically has a HA if she is dehydrated. Pt had no further questions at this time.     Dr. Garcia: please review.

## 2024-12-02 NOTE — TELEPHONE ENCOUNTER
Let pt know no significant abnormality.  Nothing affecting memory.  Scattered non specific wm changes mild in frontal areas occ seen with migraines.  Please see if any history of concussion.  Also send to pcp as well.

## 2024-12-02 NOTE — TELEPHONE ENCOUNTER
Patient reported that she had 3 miscarriages and has been trying to conceive for 1.5 years but has not been able to as of yet. Pt has seen PCP and GYN but unable to determine why pt is having trouble.     Pt reported no ho family history of blood clots but pt's mother had a white mass in her brain but pt's mother never had it addressed. Stated her mother was an addict and was too scare to look into it further.

## 2024-12-02 NOTE — PATIENT COMMUNICATION
LOV 9/30/24  Pt cancelled FU appt on 12/3 with [facility]        MRI Brain   IMPRESSION:     1. No acute infarct, mass effect or midline shift.     2. Scattered nonspecific foci of T2 prolongation in the white matter with a slight frontal lobe predominance, mild in extent. Finding has been described in the setting of headache/migraine disorder although other differential considerations include   precocious microangiopathy, among other entities.

## 2024-12-02 NOTE — PROGRESS NOTES
Sleep Study Documentation    Pre-Sleep Study       Sleep testing procedure explained to patient:YES    Patient napped prior to study:YES- more than 30 minutes. Napped after 2PM: yes    Caffeine:Dayshift worker after 12PM.  Caffeine use:NO    Alcohol:Dayshift workers after 5PM: Alcohol use:NO    Typical day for patient:YES       Study Documentation    Sleep Study Indications: EDS. BMI>30, and loud snoring.    Sleep Study: Diagnostic   Snore:Severe  Supplemental O2: no    Minimum SaO2 89%  Baseline SaO2 94%    EKG abnormalities: no     EEG abnormalities: no    Were abnormal behaviors in sleep observed:NO    Is Total Sleep Study Recording Time < 2 hours: N/A    Is Total Sleep Study Recording Time > 2 hours but study is incomplete: N/A    Is Total Sleep Study Recording Time 6 hours or more but sleep was not obtained: NO    Patient classification: employed       Post-Sleep Study    Medication used at bedtime or during sleep study:NO    Patient reports time it took to fall asleep:greater than 60 minutes    Patient reports waking up during study:3 or more times.  Patient reports returning to sleep in 10 to 30 minutes.    Patient reports sleeping 2 to 4 hours with dreaming.    Does the Patient feel this is a typical night of sleep:worse than usual    Patient rated sleepiness: Somewhat sleepy or tired    PAP treatment:no.

## 2024-12-03 ENCOUNTER — APPOINTMENT (OUTPATIENT)
Dept: LAB | Facility: MEDICAL CENTER | Age: 29
End: 2024-12-03
Payer: COMMERCIAL

## 2024-12-03 ENCOUNTER — TELEPHONE (OUTPATIENT)
Dept: FAMILY MEDICINE CLINIC | Facility: CLINIC | Age: 29
End: 2024-12-03

## 2024-12-03 DIAGNOSIS — O03.9 MISCARRIAGE: ICD-10-CM

## 2024-12-03 DIAGNOSIS — F41.9 ANXIETY AND DEPRESSION: ICD-10-CM

## 2024-12-03 DIAGNOSIS — F32.A ANXIETY AND DEPRESSION: ICD-10-CM

## 2024-12-03 PROCEDURE — 85306 CLOT INHIBIT PROT S FREE: CPT

## 2024-12-03 PROCEDURE — 86147 CARDIOLIPIN ANTIBODY EA IG: CPT

## 2024-12-03 PROCEDURE — 85705 THROMBOPLASTIN INHIBITION: CPT

## 2024-12-03 PROCEDURE — 85670 THROMBIN TIME PLASMA: CPT

## 2024-12-03 PROCEDURE — 85300 ANTITHROMBIN III ACTIVITY: CPT

## 2024-12-03 PROCEDURE — 85305 CLOT INHIBIT PROT S TOTAL: CPT

## 2024-12-03 PROCEDURE — 86146 BETA-2 GLYCOPROTEIN ANTIBODY: CPT

## 2024-12-03 PROCEDURE — 85613 RUSSELL VIPER VENOM DILUTED: CPT

## 2024-12-03 PROCEDURE — 36415 COLL VENOUS BLD VENIPUNCTURE: CPT

## 2024-12-03 PROCEDURE — 85732 THROMBOPLASTIN TIME PARTIAL: CPT

## 2024-12-03 PROCEDURE — 85303 CLOT INHIBIT PROT C ACTIVITY: CPT

## 2024-12-03 RX ORDER — BUPROPION HYDROCHLORIDE 200 MG/1
200 TABLET, EXTENDED RELEASE ORAL 2 TIMES DAILY
Qty: 60 TABLET | Refills: 1 | Status: SHIPPED | OUTPATIENT
Start: 2024-12-03

## 2024-12-04 LAB — DEPRECATED AT III PPP: 113 % OF NORMAL (ref 92–136)

## 2024-12-05 LAB
APTT SCREEN TO CONFIRM RATIO: 1.21 RATIO (ref 0–1.34)
CONFIRM APTT/NORMAL: 40 SEC (ref 0–47.6)
LA PPP-IMP: NORMAL
PROT C AG ACT/NOR PPP IA: 141 % OF NORMAL (ref 60–150)
PROT S ACT/NOR PPP: 126 % (ref 68–108)
PROT S ACT/NOR PPP: 94 % (ref 61–136)
PROT S PPP-ACNC: 66 % (ref 60–150)
SCREEN APTT: 33.8 SEC (ref 0–43.5)
SCREEN DRVVT: 44 SEC (ref 0–47)
THROMBIN TIME: 16.3 SEC (ref 0–23)

## 2024-12-06 LAB
B2 GLYCOPROT1 IGA SERPL IA-ACNC: 1
B2 GLYCOPROT1 IGG SERPL IA-ACNC: <0.8
B2 GLYCOPROT1 IGM SERPL IA-ACNC: <2.4
CARDIOLIPIN IGA SER IA-ACNC: 1.5
CARDIOLIPIN IGG SER IA-ACNC: 1.2
CARDIOLIPIN IGM SER IA-ACNC: 1.4

## 2024-12-10 ENCOUNTER — OFFICE VISIT (OUTPATIENT)
Dept: NEUROLOGY | Facility: CLINIC | Age: 29
End: 2024-12-10
Payer: COMMERCIAL

## 2024-12-10 ENCOUNTER — RESULTS FOLLOW-UP (OUTPATIENT)
Dept: SLEEP CENTER | Facility: CLINIC | Age: 29
End: 2024-12-10

## 2024-12-10 VITALS
RESPIRATION RATE: 18 BRPM | OXYGEN SATURATION: 97 % | BODY MASS INDEX: 49.22 KG/M2 | WEIGHT: 277.8 LBS | TEMPERATURE: 97.7 F | HEIGHT: 63 IN | HEART RATE: 101 BPM | SYSTOLIC BLOOD PRESSURE: 134 MMHG | DIASTOLIC BLOOD PRESSURE: 76 MMHG

## 2024-12-10 DIAGNOSIS — G47.33 OSA (OBSTRUCTIVE SLEEP APNEA): ICD-10-CM

## 2024-12-10 DIAGNOSIS — E53.8 VITAMIN B12 DEFICIENCY: ICD-10-CM

## 2024-12-10 DIAGNOSIS — R41.3 COMPLAINTS OF MEMORY DISTURBANCE: Primary | ICD-10-CM

## 2024-12-10 DIAGNOSIS — G47.33 OSA (OBSTRUCTIVE SLEEP APNEA): Primary | ICD-10-CM

## 2024-12-10 DIAGNOSIS — G47.19 EXCESSIVE DAYTIME SLEEPINESS: ICD-10-CM

## 2024-12-10 PROCEDURE — 99214 OFFICE O/P EST MOD 30 MIN: CPT | Performed by: PHYSICIAN ASSISTANT

## 2024-12-10 NOTE — PROGRESS NOTES
Name: Paz Pierre      : 1995      MRN: [id number]  Encounter Provider: Lyndsay Tamayo PA-C  Encounter Date: 12/10/2024   Encounter department: [facility] [place]  :  Assessment & Plan  Complaints of memory disturbance  Patient initially presented with complaints of difficulty focusing and concentrating, especially with some stress, and with some associated lightheadedness with this.  MoCA 26/30.  She has history of B12 deficiency and was being evaluated for MAXIMINO.  Also with anxiety and depression, and recent medication change for this.    MRI brain without contrast 10/16/2024 showed minimal white matter changes in the frontal lobes, no atrophy, ventricle enlargement or other structural changes.  We reviewed the minimal white matter changes are likely due to some cardiovascular risk factors (slightly elevated cholesterol, prediabetes, obesity).    She feels much better since transitioning her antidepressant from sertraline to bupropion.  She felt the sertraline was causing many of her issues.    She is now only having some difficulty focusing and concentrating when she is very busy and stressed at work, and also not drinking a lot of water.  We discussed dehydration and stress could be playing a role in her difficulty focusing, as well as lightheadedness on standing (she sees cardiology).    She is taking a B12 supplement and recent B12 was normal (has been low for a long time).  She was diagnosed with mild MAXIMINO, waiting to hear back from sleep medicine regarding if any treatment is needed.    No concern for neurodegenerative disorder at this time.    We discussed likely multiple factors playing a role in this including stress/anxiety, dehydration/potentially low glucose if not eating during the day, mild MAXIMINO, and B12 deficiency.         Vitamin B12 deficiency  Currently on adequate replacement, should continue current supplement.       MAXIMINO (obstructive sleep apnea)  Recently  diagnosed, follows with sleep medicine.  Awaiting to hear if treatment is needed.       Patient will follow-up on an as-needed basis.          History of Present Illness   HPI  Paz Pierre is a 28 y.o. female with PMH of obesity, pre-DM, B12 deficiency, vit D deficiency, recurrent miscarriage, anxiety, depression, presents today for follow up evaluation of concentration deficits and dizziness.    Interval history 12/10/2024:  Today, patient reports she is doing well.  When she was seen for initial consult, she had just transitioned from sertraline to bupropion for her anxiety/depression.  She tells me now that she has been off sertraline and on bupropion, she is doing much better.  She feels sertraline was contributing to concentration deficits and other side effects.  She still has some occasional difficulty focusing and concentrating, mainly at work when she is very busy.  She also attributes this to not drinking water when busy at work.  She is a  at a vet clinic.  When she is busy and getting up and down all day and not drinking water, she feels a little overwhelmed and that is when she has trouble focusing and feels a little lightheaded on standing.  Otherwise, she is doing just fine from a neurologic standpoint.  She had a home sleep study which was inconclusive and more recently had an in lab sleep study which showed mild MAXIMINO, mainly in the supine position.  She is waiting to hear back from sleep medicine regarding if she needs treatment for this.  She is taking an OTC B12 supplement 2500 mcg/day.  B12 level 11/14/2024 = 956.  Recent TSH normal.  She also had a thrombosis panel checked which was unremarkable except for elevated protein S, which is not clinically significant.  She is seeing hematology soon due to her recurrent miscarriages.  In the past she had a positive lupus anticoagulant, but this has been rechecked twice, including with recent thrombosis panel 12/3/2024, this was  negative.  MRI brain without contrast 10/16/2024 showed very mild white matter changes, predominantly in the frontal lobes.    History from consult 2024 (Dr. Garcia/Dr. Ash):  27 yo female here to establish care for issues with STM, dizziness, impaired concentration. She had a  3 years ago and started noticing issues after that with her focus and feeling slow to understand and comprehend information. This then progressed over the past year to include dizziness upon standing. She has been seen by cardiology, ob/gyn, and endocrinology. Cardiology is currently working patient up for palpitations and tachycardia as patient has concerns for POTS. Ob/gyn and endocrinology have been working patient up given 3 miscarriages and as she never returned to her baseline prior to her . She gained a lot of weight from her pregnancy and after her mother passed away 4 months after she gave birth. She was on zoloft for depression, which was recently weaned off and transitioned to wellbutrin due to concerns for the weight gain and the possibility that it was contributing to her nightmares. She has poor sleep and never feels well rested. She had an inconclusive home sleep study as the leads came off, but she will have an in person sleep study this December. She feels that there are issues with comprehension that she has had since she was a child, no other issues in school. Both her parents were addicted to drugs so she is not sure if she had any in utero exposure, but she reports a difficult upbringing and needed foster care from 7 to 10 years old. She has been to therapists but it is difficult financially. Overall, she is doing well at her new job but concerned this may impact her work. She feels she loses her train of thought easily and will forget things quickly if she doesn't jot it down on paper. She struggles sometimes to find the right words. No hx of concussion and no self or family history of  seizures.     MOCA 26/30, otherwise non-focal exam.  Felt to have several factors contributing to concentration and memory complaints.    Review of Systems   Constitutional: Negative.  Negative for fatigue and fever.   HENT: Negative.  Negative for hearing loss, tinnitus and trouble swallowing.    Eyes:  Negative for photophobia, pain and visual disturbance.   Respiratory: Negative.  Negative for cough and shortness of breath.    Cardiovascular: Negative.  Negative for chest pain and palpitations.   Gastrointestinal:  Negative for constipation, diarrhea, nausea and vomiting.   Endocrine: Negative.    Genitourinary: Negative.  Negative for difficulty urinating and urgency.   Musculoskeletal: Negative.  Negative for back pain, gait problem and neck pain.   Skin: Negative.  Negative for rash.   Neurological: Negative.  Negative for dizziness, tremors, seizures, syncope, speech difficulty, weakness, numbness and headaches.   Hematological: Negative.    Psychiatric/Behavioral:  Positive for decreased concentration (improved). Negative for sleep disturbance. The patient is not nervous/anxious.     I have personally reviewed the MA's review of systems and made changes as necessary.    Current Outpatient Medications on File Prior to Visit   Medication Sig Dispense Refill    buPROPion (WELLBUTRIN SR) 200 MG 12 hr tablet Take 1 tablet (200 mg total) by mouth 2 (two) times a day 60 tablet 1    cyclobenzaprine (FLEXERIL) 5 mg tablet Take 1 tablet (5 mg total) by mouth 3 (three) times a day as needed for muscle spasms 30 tablet 0    ergocalciferol (VITAMIN D2) 50,000 units Take 1 capsule (50,000 Units total) by mouth once a week 12 capsule 0    semaglutide, 0.25 or 0.5 mg/dose, (Ozempic, 0.25 or 0.5 MG/DOSE,) 2 mg/3 mL injection pen Inject 0.75 mL (0.5 mg total) under the skin every 7 days (Patient not taking: Reported on 12/10/2024) 9 mL 1     Current Facility-Administered Medications on File Prior to Visit   Medication Dose  "Route Frequency Provider Last Rate Last Admin    cyanocobalamin injection 1,000 mcg  1,000 mcg Intramuscular Q30 Days Denise BacaSTEVE   1,000 mcg at 05/15/24 0941         Objective   /76 (BP Location: Left arm, Patient Position: Sitting, Cuff Size: Large)   Pulse 101   Temp 97.7 °F (36.5 °C) (Temporal)   Resp 18   Ht 5' 3\" (1.6 m)   Wt 126 kg (277 lb 12.8 oz)   SpO2 97%   BMI 49.21 kg/m²     Physical Exam  Constitutional:       Appearance: Normal appearance.   Eyes:      Extraocular Movements: EOM normal.      Pupils: Pupils are equal, round, and reactive to light.   Neurological:      Mental Status: She is alert.      Motor: Motor strength is normal.     Deep Tendon Reflexes: Reflexes are normal and symmetric.   Psychiatric:         Mood and Affect: Mood normal.         Speech: Speech normal.         Behavior: Behavior normal.       Neurological Exam  Mental Status  Alert. Oriented to person, place, time and situation. Speech is normal. Language is fluent with no aphasia. Attention and concentration are normal.    Cranial Nerves  CN II: Visual fields full to confrontation.  CN III, IV, VI: Extraocular movements intact bilaterally. Pupils equal round and reactive to light bilaterally.  CN V: Facial sensation is normal.  CN VII: Full and symmetric facial movement.  CN VIII: Hearing is normal.  CN IX, X: Palate elevates symmetrically  CN XI: Shoulder shrug strength is normal.  CN XII: Tongue midline without atrophy or fasciculations.    Motor   Normal muscle tone. Strength is 5/5 throughout all four extremities.    Sensory  Light touch is normal in upper and lower extremities.     Reflexes  Deep tendon reflexes are 2+ and symmetric in all four extremities.    Coordination  Right: Finger-to-nose normal.Left: Finger-to-nose normal.    Gait  Casual gait is normal including stance, stride, and arm swing.       Radiology Results Review: I have reviewed radiology reports from 10/16/2024 including: MRI " brain.

## 2024-12-10 NOTE — ASSESSMENT & PLAN NOTE
Patient initially presented with complaints of difficulty focusing and concentrating, especially with some stress, and with some associated lightheadedness with this.  MoCA 26/30.  She has history of B12 deficiency and was being evaluated for MAXIMINO.  Also with anxiety and depression, and recent medication change for this.    MRI brain without contrast 10/16/2024 showed minimal white matter changes in the frontal lobes, no atrophy, ventricle enlargement or other structural changes.  We reviewed the minimal white matter changes are likely due to some cardiovascular risk factors (slightly elevated cholesterol, prediabetes, obesity).    She feels much better since transitioning her antidepressant from sertraline to bupropion.  She felt the sertraline was causing many of her issues.    She is now only having some difficulty focusing and concentrating when she is very busy and stressed at work, and also not drinking a lot of water.  We discussed dehydration and stress could be playing a role in her difficulty focusing, as well as lightheadedness on standing (she sees cardiology).    She is taking a B12 supplement and recent B12 was normal (has been low for a long time).  She was diagnosed with mild MAXIMINO, waiting to hear back from sleep medicine regarding if any treatment is needed.    No concern for neurodegenerative disorder at this time.    We discussed likely multiple factors playing a role in this including stress/anxiety, dehydration/potentially low glucose if not eating during the day, mild MAXIMINO, and B12 deficiency.

## 2024-12-10 NOTE — PATIENT INSTRUCTIONS
Continue to work with PCP regarding management of depression, anxiety, medication management.  Continue with a B12 supplement  Your recent sleep study showed mild sleep apnea.  Talk to sleep medicine about potential treatment  Follow up as-needed

## 2024-12-18 NOTE — TELEPHONE ENCOUNTER
----- Message from KELL Li sent at 12/10/2024  8:17 PM EST -----  Mild obstructive sleep apnea was confirmed during the diagnostic sleep study and is likely contributing to symptoms.  Due to nightmares and excessive daytime sleepiness, recommend trial of auto-CPAP.  A prescription for equipment has been provided.  Patient to be scheduled for set up of equipment, followed by compliance follow up 31-91 days after set up.

## 2024-12-18 NOTE — TELEPHONE ENCOUNTER
Called patient and left message advising I will send a tenfarmshart message with the sleep study results and next steps.  Provided sleep center number([phone]) to call if any questions.

## 2024-12-23 ENCOUNTER — OFFICE VISIT (OUTPATIENT)
Dept: URGENT CARE | Facility: MEDICAL CENTER | Age: 29
End: 2024-12-23
Payer: COMMERCIAL

## 2024-12-23 VITALS
SYSTOLIC BLOOD PRESSURE: 124 MMHG | OXYGEN SATURATION: 97 % | TEMPERATURE: 98 F | HEART RATE: 97 BPM | RESPIRATION RATE: 18 BRPM | DIASTOLIC BLOOD PRESSURE: 88 MMHG

## 2024-12-23 DIAGNOSIS — J01.90 ACUTE SINUSITIS, RECURRENCE NOT SPECIFIED, UNSPECIFIED LOCATION: Primary | ICD-10-CM

## 2024-12-23 PROCEDURE — 99213 OFFICE O/P EST LOW 20 MIN: CPT

## 2024-12-23 NOTE — PATIENT INSTRUCTIONS
You may take over the counter Tylenol (Acetaminophen) and/or Motrin (Ibuprofen) as needed, as directed on packaging. Be sure to get plenty of rest, and drinking fluids to remain hydrated.     Please follow up with your primary provider in the next several days. Should you have any worsening of symptoms, or lack of improvement please be re-evaluated. If needed for significant concerns, consider 911 or ER evaluation.     Over the counter decongestants can be used, only as directed on the box. However if you have any history of cardiac disease or high blood pressure these should be avoided, as we caution the use of these since they can make place strain on your heart and cause increase in blood pressure. It is recommended in lieu of decongestants to use cool mist vaporizer, saline nasal spray to relieve nasal congestion. It is also important to remain hydrated and drink plenty of fluids (avoiding caffeine and alcohol).     OVER THE COUNTER: Flonase (fluticasone) nasal spray or Astepro nasal spray may be appropriate for your symptoms as well. Please follow the directions on the package for use. (Store brand is perfectly fine). Avoid the Afrin- (main ingredient oxymetazoline), because if you use it more than 2 days you may develop rebound congestion which is worse than what you started with.

## 2024-12-23 NOTE — PROGRESS NOTES
[facility]        NAME: Paz Pierre is a 29 y.o. female  : 1995    MRN: [id number]  DATE: 2024  TIME: 2:36 PM    Assessment and Plan   Acute sinusitis, recurrence not specified, unspecified location [J01.90]  1. Acute sinusitis, recurrence not specified, unspecified location  amoxicillin-clavulanate (AUGMENTIN) 875-125 mg per tablet            Patient Instructions       Follow up with PCP in 3-5 days.  Proceed to  ER if symptoms worsen.    If tests are performed, our office will contact you with results only if changes need to made to the care plan discussed with you at the visit. You can review your full results on [facility].    Chief Complaint     Chief Complaint   Patient presents with   • Cough     Sx started 7 days ago. Right ear blocked, nasal congestion. On and off cough. No fevers. But states she feels warm. Watery eyes. Has been taking mucinex. No n/v/d.    • Headache   • Earache   • Nasal Congestion         History of Present Illness       Patient here with reports of x7 days of symptoms. Monday called off due to HA and sinus congestion. She reports she had improvement of her symptoms until Friday started with worse headache and sinus congestion. Also having ear pain/blocked sensation. At home she has been taking the Mucinex (Sinus Max -APAP, Guaifenesin, Dextromethorphan, Phenylephrine). Every 4-5hrs with only slight improvement of the headache. She has not had any fevers. She has not had any N/V/D. She has been around a coworker who was sick but her coworker didn't seem as significant of symptoms. She has not had any prior problems/surgeries with her sinuses/ears.         Review of Systems   Review of Systems   Constitutional:  Negative for appetite change, chills, fatigue and fever.   HENT:  Positive for congestion, ear pain, postnasal drip, rhinorrhea, sinus pressure and sinus pain. Negative for sore throat and trouble swallowing.    Respiratory:   Positive for cough. Negative for chest tightness and shortness of breath.         Dry non-productive     Cardiovascular:  Negative for chest pain and palpitations.   Gastrointestinal:  Negative for abdominal pain, constipation, diarrhea, nausea and vomiting.   Musculoskeletal:  Negative for arthralgias and back pain.   Skin:  Negative for color change and rash.   Neurological:  Positive for headaches. Negative for dizziness and light-headedness.   All other systems reviewed and are negative.        Current Medications       Current Outpatient Medications:   •  amoxicillin-clavulanate (AUGMENTIN) 875-125 mg per tablet, Take 1 tablet by mouth every 12 (twelve) hours for 7 days, Disp: 14 tablet, Rfl: 0  •  buPROPion (WELLBUTRIN SR) 200 MG 12 hr tablet, Take 1 tablet (200 mg total) by mouth 2 (two) times a day, Disp: 60 tablet, Rfl: 1  •  cyclobenzaprine (FLEXERIL) 5 mg tablet, Take 1 tablet (5 mg total) by mouth 3 (three) times a day as needed for muscle spasms, Disp: 30 tablet, Rfl: 0  •  ergocalciferol (VITAMIN D2) 50,000 units, Take 1 capsule (50,000 Units total) by mouth once a week, Disp: 12 capsule, Rfl: 0  No current facility-administered medications for this visit.    Current Allergies     Allergies as of 2024   • (No Known Allergies)            The following portions of the patient's history were reviewed and updated as appropriate: allergies, current medications, past family history, past medical history, past social history, past surgical history and problem list.     Past Medical History:   Diagnosis Date   • Anxiety    • Asthma     as a child   • Depression    • Diet controlled gestational diabetes mellitus (GDM) in third trimester 3/3/2021   • Miscarriage 2023   • Recurrent pregnancy loss 2023   • Urinary tract infection        Past Surgical History:   Procedure Laterality Date   • MS  DELIVERY ONLY N/A 2021    Procedure:  SECTION ();  Surgeon: Kassandra Martinez  MD;  Location: [facility];  Service: Obstetrics       Family History   Problem Relation Age of Onset   • Asthma Mother    • Diabetes Maternal Grandmother    • Hypertension Maternal Grandmother    • Thyroid disease Maternal Grandmother    • Breast cancer Neg Hx    • Colon cancer Neg Hx    • Ovarian cancer Neg Hx          Medications have been verified.        Objective   /88   Pulse 97   Temp 98 °F (36.7 °C)   Resp 18   SpO2 97%        Physical Exam     Physical Exam  Vitals and nursing note reviewed.   Constitutional:       General: She is not in acute distress.     Appearance: Normal appearance. She is normal weight. She is not ill-appearing.   HENT:      Head: Normocephalic and atraumatic.      Right Ear: Ear canal and external ear normal. A middle ear effusion is present. Tympanic membrane is erythematous and bulging.      Left Ear: Ear canal and external ear normal. A middle ear effusion is present. Tympanic membrane is erythematous and bulging.      Nose: Congestion and rhinorrhea present. Rhinorrhea is clear.      Mouth/Throat:      Lips: Pink.      Mouth: Mucous membranes are moist.      Pharynx: Oropharynx is clear. Uvula midline. Posterior oropharyngeal erythema and postnasal drip present.   Eyes:      Extraocular Movements: Extraocular movements intact.      Conjunctiva/sclera: Conjunctivae normal.      Pupils: Pupils are equal, round, and reactive to light.   Cardiovascular:      Rate and Rhythm: Normal rate and regular rhythm.      Pulses: Normal pulses.      Heart sounds: Normal heart sounds.   Pulmonary:      Effort: Pulmonary effort is normal.      Breath sounds: Normal breath sounds.   Abdominal:      General: Abdomen is flat. Bowel sounds are normal.      Palpations: Abdomen is soft.   Musculoskeletal:         General: Normal range of motion.      Cervical back: Full passive range of motion without pain, normal range of motion and neck supple.   Skin:     General: Skin is warm.      Capillary  Refill: Capillary refill takes less than 2 seconds.      Findings: No rash.   Neurological:      General: No focal deficit present.      Mental Status: She is alert and oriented to person, place, and time.   Psychiatric:         Mood and Affect: Mood normal.         Behavior: Behavior normal.

## 2024-12-23 NOTE — LETTER
December 23, 2024     Patient: Paz Pierre   YOB: 1995   Date of Visit: 12/23/2024       To Whom it May Concern:    Paz Pierre was seen in my clinic on 12/23/2024. She may return to work on 12/25/2024 provided she is fever free x24 hours without fever reducing medicines .    If you have any questions or concerns, please don't hesitate to call.         Sincerely,          KELL Cartwright        CC: No Recipients

## 2024-12-30 ENCOUNTER — NURSE TRIAGE (OUTPATIENT)
Age: 29
End: 2024-12-30

## 2024-12-30 DIAGNOSIS — N89.8 VAGINAL ITCHING: Primary | ICD-10-CM

## 2024-12-30 RX ORDER — FLUCONAZOLE 150 MG/1
150 TABLET ORAL DAILY
Qty: 1 TABLET | Refills: 0 | Status: SHIPPED | OUTPATIENT
Start: 2024-12-30 | End: 2024-12-31

## 2024-12-30 NOTE — TELEPHONE ENCOUNTER
"Patient called office c/o vaginal itching and clear discharge that started about 3 days ago. Itching is on the outside of her vagina. She denies pain, bleeding, abdominal pain or any other symptoms to note. She requests medication at this time. Diflucan ordered and advised she call back if she does have resolved symptoms after taking. Advised she call back with worsening symptoms or new symptoms.           Reason for Disposition   Symptoms of a yeast infection (i.e., itchy, white discharge, not bad smelling) and feels like prior vaginal yeast infections    Answer Assessment - Initial Assessment Questions  1. SYMPTOM: \"What's the main symptom you're concerned about?\" (e.g., pain, itching, dryness)      Vaginal itching   2. LOCATION: \"Where is the  itching located?\" (e.g., inside/outside, left/right)      Outside vagina   3. ONSET: \"When did the  symptoms  start?\"      3 days ago   4. PAIN: \"Is there any pain?\" If Yes, ask: \"How bad is it?\" (Scale: 1-10; mild, moderate, severe)      No   5. ITCHING: \"Is there any itching?\" If Yes, ask: \"How bad is it?\" (Scale: 1-10; mild, moderate, severe)      4   6. CAUSE: \"What do you think is causing the discharge?\" \"Have you had the same problem before?\" \"What happened then?\"      Clear discharge   7. OTHER SYMPTOMS: \"Do you have any other symptoms?\" (e.g., fever, itching, vaginal bleeding, pain with urination, injury to genital area, vaginal foreign body)      None   8. PREGNANCY: \"Is there any chance you are pregnant?\" \"When was your last menstrual period?\"      No. LMP: 12/9/24    Protocols used: Vaginal Symptoms-Adult-[place]    \"How many yeast infections have you had in the past 2 years?\"    -If 1 or less, prescribe Diflucan 150mg PO. If no improvement after 1 dose treatment, please instruct patient to call back to schedule appointment. (should be seen within 3 days)    -If more than 4 or more yeast infections in a year or if they are recurrent, schedule appointment within 3 " days.    For OB patients: if patient wishes to use over the counter monistat, recommend only the 7 day treatment.

## 2025-01-01 ENCOUNTER — PATIENT MESSAGE (OUTPATIENT)
Dept: OBGYN CLINIC | Facility: CLINIC | Age: 30
End: 2025-01-01

## 2025-01-01 DIAGNOSIS — B37.31 VULVOVAGINAL CANDIDIASIS: Primary | ICD-10-CM

## 2025-01-02 RX ORDER — FLUCONAZOLE 150 MG/1
150 TABLET ORAL
Qty: 2 TABLET | Refills: 0 | Status: SHIPPED | OUTPATIENT
Start: 2025-01-02 | End: 2025-01-06

## 2025-01-14 ENCOUNTER — TELEPHONE (OUTPATIENT)
Dept: SLEEP CENTER | Facility: CLINIC | Age: 30
End: 2025-01-14

## 2025-01-15 ENCOUNTER — TELEPHONE (OUTPATIENT)
Dept: FAMILY MEDICINE CLINIC | Facility: CLINIC | Age: 30
End: 2025-01-15

## 2025-01-15 LAB

## 2025-01-16 ENCOUNTER — TELEPHONE (OUTPATIENT)
Dept: FAMILY MEDICINE CLINIC | Facility: CLINIC | Age: 30
End: 2025-01-16

## 2025-01-16 DIAGNOSIS — F32.A ANXIETY AND DEPRESSION: Primary | ICD-10-CM

## 2025-01-16 DIAGNOSIS — F41.9 ANXIETY AND DEPRESSION: Primary | ICD-10-CM

## 2025-01-16 RX ORDER — ESCITALOPRAM OXALATE 5 MG/1
5 TABLET ORAL DAILY
Qty: 30 TABLET | Refills: 0 | Status: SHIPPED | OUTPATIENT
Start: 2025-01-16

## 2025-01-16 NOTE — TELEPHONE ENCOUNTER
Let's start her on Lexapro 5 mg, she has an upcoming appointment with me on 2/11/25 will see how she is doing with the Lexapro at that appointment.

## 2025-01-21 ENCOUNTER — TELEPHONE (OUTPATIENT)
Age: 30
End: 2025-01-21

## 2025-01-21 LAB

## 2025-01-21 NOTE — TELEPHONE ENCOUNTER
Patient has been added to the Medication Management and Talk Therapy wait list without a referral. PCP started her on Lexapro.    Insurance: [employer]  ID [id number] [facility] [id number]  [address]  [place], [place]  [phone]  Insurance Type:    Commercial [x]   Medicaid []   [facility] (if applicable)   Medicare []  Location Preference: [facility]  Provider Preference: Female  Virtual: Yes [] No [x]  Were outside resources sent: Yes [] No [x]

## 2025-01-27 ENCOUNTER — TELEPHONE (OUTPATIENT)
Dept: HEMATOLOGY ONCOLOGY | Facility: CLINIC | Age: 30
End: 2025-01-27

## 2025-01-27 NOTE — TELEPHONE ENCOUNTER
Left vm for pt. Pt can only see a MD. Pt rs for 2/21 next opening w/ one of the MDs at [address] [facility] @ 1100 am. Left hope line [phone] for pt to confirm or rs.

## 2025-01-28 LAB
DME PARACHUTE DELIVERY DATE EXPECTED: NORMAL
DME PARACHUTE DELIVERY DATE REQUESTED: NORMAL
DME PARACHUTE DELIVERY NOTE: NORMAL
DME PARACHUTE ITEM DESCRIPTION: NORMAL
DME PARACHUTE ORDER STATUS: NORMAL
DME PARACHUTE SUPPLIER NAME: NORMAL
DME PARACHUTE SUPPLIER PHONE: NORMAL

## 2025-02-04 ENCOUNTER — CONSULT (OUTPATIENT)
Dept: HEMATOLOGY ONCOLOGY | Facility: CLINIC | Age: 30
End: 2025-02-04
Payer: COMMERCIAL

## 2025-02-04 VITALS
WEIGHT: 280.4 LBS | OXYGEN SATURATION: 96 % | SYSTOLIC BLOOD PRESSURE: 113 MMHG | HEART RATE: 99 BPM | BODY MASS INDEX: 49.68 KG/M2 | HEIGHT: 63 IN | DIASTOLIC BLOOD PRESSURE: 75 MMHG | TEMPERATURE: 97.6 F

## 2025-02-04 DIAGNOSIS — R41.840 IMPAIRED CONCENTRATION: ICD-10-CM

## 2025-02-04 DIAGNOSIS — R41.3 SHORT-TERM MEMORY LOSS: ICD-10-CM

## 2025-02-04 DIAGNOSIS — F41.9 ANXIETY AND DEPRESSION: ICD-10-CM

## 2025-02-04 DIAGNOSIS — F32.A ANXIETY AND DEPRESSION: ICD-10-CM

## 2025-02-04 DIAGNOSIS — R42 DIZZINESS: ICD-10-CM

## 2025-02-04 DIAGNOSIS — E55.9 VITAMIN D DEFICIENCY: ICD-10-CM

## 2025-02-04 DIAGNOSIS — N96 RECURRENT PREGNANCY LOSS: ICD-10-CM

## 2025-02-04 DIAGNOSIS — R76.0 LUPUS ANTICOAGULANT POSITIVE: Primary | ICD-10-CM

## 2025-02-04 DIAGNOSIS — O03.9 MISCARRIAGE: ICD-10-CM

## 2025-02-04 PROCEDURE — 99203 OFFICE O/P NEW LOW 30 MIN: CPT | Performed by: INTERNAL MEDICINE

## 2025-02-04 NOTE — LETTER
February 4, 2025     Denise Baca PA-C  [address] [facility] [address]    Patient: Paz Pierre   YOB: 1995   Date of Visit: 2/4/2025       Dear Dr. Baca:    Thank you for referring Paz Pierre to me for evaluation. Below are my notes for this consultation.    If you have questions, please do not hesitate to call me. I look forward to following your patient along with you.         Sincerely,        Carlos Bill, DO        CC: MD Hilda Hubbard MD Neil David Belman,   2/4/2025  2:43 PM  Sign when Signing Visit    Paz Pierre  1995  HEM ONC [facility] [place] [address] [address] [facility] [address]  [phone]    Chief Complaint   Patient presents with   • Consult         Oncology History    No history exists.       History of Present Illness:  Patient is a 29-year-old female, G3, P1 021.  She has had a live birth followed by 3 first trimester miscarriages.  Most recently 1 year ago.  She had negative phospholipids in October 2023.  This was repeated in April 2024, PTT greater than 160.  DRVVT ratio normal.  Subsequent repeat in June and December 2024 was normal.  December 2024 she had thrombosis panel which was negative except protein S vividly 126, top normal 108.      Review of Systems   Constitutional:  Negative for appetite change, diaphoresis, fatigue and fever.   HENT:  Negative for sinus pain.    Eyes:  Negative for discharge.   Respiratory:  Negative for cough and shortness of breath.    Cardiovascular:  Negative for chest pain.   Gastrointestinal:  Negative for abdominal pain, constipation and diarrhea.   Endocrine: Negative for cold intolerance.   Genitourinary:  Negative for difficulty urinating and hematuria.   Musculoskeletal:  Negative for joint swelling.   Skin:  Negative for rash.   Allergic/Immunologic: Negative for environmental allergies.   Neurological:  Negative for  dizziness and headaches.   Hematological:  Negative for adenopathy.   Psychiatric/Behavioral:  Negative for agitation.        Patient Active Problem List   Diagnosis   • Vitamin D deficiency   • Weight gain   • Arcuate uterus   • Anxiety and depression   • Vitamin B12 deficiency   • Miscarriage   • Recurrent pregnancy loss   • Class 3 obesity   • Shaky   • PCOS (polycystic ovarian syndrome)   • Sinus tachycardia   • Sleep apnea-like behavior   • Excessive daytime sleepiness   • Other fatigue   • Suspected sleep apnea   • Nightmares   • Circadian rhythm sleep disorder   • MAXIMINO (obstructive sleep apnea)   • Complaints of memory disturbance     Past Medical History:   Diagnosis Date   • Anxiety    • Asthma     as a child   • Depression    • Diet controlled gestational diabetes mellitus (GDM) in third trimester 3/3/2021   • Miscarriage 2023   • Recurrent pregnancy loss 2023   • Urinary tract infection      Past Surgical History:   Procedure Laterality Date   • [place]  DELIVERY ONLY N/A 2021    Procedure:  SECTION ();  Surgeon: Kassandra Martinez MD;  Location: [facility];  Service: Obstetrics     Family History   Problem Relation Age of Onset   • Asthma Mother    • Diabetes Maternal Grandmother    • Hypertension Maternal Grandmother    • Thyroid disease Maternal Grandmother    • Breast cancer Neg Hx    • Colon cancer Neg Hx    • Ovarian cancer Neg Hx      Social History     Socioeconomic History   • Marital status: /Civil Union     Spouse name: Not on file   • Number of children: Not on file   • Years of education: Not on file   • Highest education level: Not on file   Occupational History   • Not on file   Tobacco Use   • Smoking status: Never   • Smokeless tobacco: Never   Vaping Use   • Vaping status: Never Used   Substance and Sexual Activity   • Alcohol use: Not Currently   • Drug use: Never   • Sexual activity: Yes     Partners: Male     Birth control/protection: None   Other  Topics Concern   • Not on file   Social History Narrative   • Not on file     Social Drivers of Health     Financial Resource Strain: Not on file   Food Insecurity: Not on file   Transportation Needs: Not on file   Physical Activity: Not on file   Stress: Not on file   Social Connections: Not on file   Intimate Partner Violence: Not on file   Housing Stability: Not on file       Current Outpatient Medications:   •  cyclobenzaprine (FLEXERIL) 5 mg tablet, Take 1 tablet (5 mg total) by mouth 3 (three) times a day as needed for muscle spasms, Disp: 30 tablet, Rfl: 0  •  ergocalciferol (VITAMIN D2) 50,000 units, Take 1 capsule (50,000 Units total) by mouth once a week, Disp: 12 capsule, Rfl: 0  •  escitalopram (LEXAPRO) 5 mg tablet, Take 1 tablet (5 mg total) by mouth daily, Disp: 30 tablet, Rfl: 0  •  buPROPion (WELLBUTRIN SR) 200 MG 12 hr tablet, Take 1 tablet (200 mg total) by mouth 2 (two) times a day, Disp: 60 tablet, Rfl: 1  No Known Allergies  Vitals:    02/04/25 1357   BP: 113/75   Pulse: 99   Temp: 97.6 °F (36.4 °C)   SpO2: 96%       Physical Exam  Constitutional:       Appearance: She is well-developed.   HENT:      Head: Normocephalic and atraumatic.   Eyes:      Pupils: Pupils are equal, round, and reactive to light.   Cardiovascular:      Rate and Rhythm: Normal rate.      Heart sounds: No murmur heard.  Pulmonary:      Effort: No respiratory distress.      Breath sounds: No wheezing or rales.   Abdominal:      General: There is no distension.      Palpations: Abdomen is soft.      Tenderness: There is no abdominal tenderness. There is no rebound.   Musculoskeletal:         General: No tenderness.      Cervical back: Neck supple.   Lymphadenopathy:      Cervical: No cervical adenopathy.   Skin:     General: Skin is warm.      Findings: No rash.   Neurological:      Mental Status: She is alert and oriented to person, place, and time.      Deep Tendon Reflexes: Reflexes normal.   Psychiatric:         Thought  Content: Thought content normal.           Labs:  CBC, Coags, BMP, Mg, Phos     Imaging  No results found.  I reviewed the above laboratory and imaging data.    Discussion/Summary: In summary, this is a 29-year-old female with history of recurrent miscarriage.  Protein S elevation is of no clinical significance and is not contributing to these events.  Time elevated PTT during lupus anticoagulant likely reflects technical issue rather than underlying lupus anticoagulant.  I note that she was not on heparin at the time that this was carried out.  In discussing this she reports that it is often the case that blood test tubes cannot be completely filled due to poor access, etc.  In all of this, I think coagulopathy is not an identifiable contributor to her recurrent fetal loss.  I reviewed the above with the patient.  She voiced understanding.

## 2025-02-04 NOTE — PROGRESS NOTES
Paz Pierre  1995  HEM ONC [facility] [place]  [address] [facility] [address] [phone]    Chief Complaint   Patient presents with    Consult         Oncology History    No history exists.       History of Present Illness:  Patient is a 29-year-old female, G3, P1 021.  She has had a live birth followed by 3 first trimester miscarriages.  Most recently 1 year ago.  She had negative phospholipids in October 2023.  This was repeated in April 2024, PTT greater than 160.  DRVVT ratio normal.  Subsequent repeat in June and December 2024 was normal.  December 2024 she had thrombosis panel which was negative except protein S vividly 126, top normal 108.      Review of Systems   Constitutional:  Negative for appetite change, diaphoresis, fatigue and fever.   HENT:  Negative for sinus pain.    Eyes:  Negative for discharge.   Respiratory:  Negative for cough and shortness of breath.    Cardiovascular:  Negative for chest pain.   Gastrointestinal:  Negative for abdominal pain, constipation and diarrhea.   Endocrine: Negative for cold intolerance.   Genitourinary:  Negative for difficulty urinating and hematuria.   Musculoskeletal:  Negative for joint swelling.   Skin:  Negative for rash.   Allergic/Immunologic: Negative for environmental allergies.   Neurological:  Negative for dizziness and headaches.   Hematological:  Negative for adenopathy.   Psychiatric/Behavioral:  Negative for agitation.        Patient Active Problem List   Diagnosis    Vitamin D deficiency    Weight gain    Arcuate uterus    Anxiety and depression    Vitamin B12 deficiency    Miscarriage    Recurrent pregnancy loss    Class 3 obesity    Shaky    PCOS (polycystic ovarian syndrome)    Sinus tachycardia    Sleep apnea-like behavior    Excessive daytime sleepiness    Other fatigue    Suspected sleep apnea    Nightmares    Circadian rhythm sleep disorder    MAXIMINO (obstructive sleep  apnea)    Complaints of memory disturbance     Past Medical History:   Diagnosis Date    Anxiety     Asthma     as a child    Depression     Diet controlled gestational diabetes mellitus (GDM) in third trimester 3/3/2021    Miscarriage 2023    Recurrent pregnancy loss 2023    Urinary tract infection      Past Surgical History:   Procedure Laterality Date    [place]  DELIVERY ONLY N/A 2021    Procedure:  SECTION ();  Surgeon: Kassandra Martinez MD;  Location: [facility];  Service: Obstetrics     Family History   Problem Relation Age of Onset    Asthma Mother     Diabetes Maternal Grandmother     Hypertension Maternal Grandmother     Thyroid disease Maternal Grandmother     Breast cancer Neg Hx     Colon cancer Neg Hx     Ovarian cancer Neg Hx      Social History     Socioeconomic History    Marital status: /Civil Union     Spouse name: Not on file    Number of children: Not on file    Years of education: Not on file    Highest education level: Not on file   Occupational History    Not on file   Tobacco Use    Smoking status: Never    Smokeless tobacco: Never   Vaping Use    Vaping status: Never Used   Substance and Sexual Activity    Alcohol use: Not Currently    Drug use: Never    Sexual activity: Yes     Partners: Male     Birth control/protection: None   Other Topics Concern    Not on file   Social History Narrative    Not on file     Social Drivers of Health     Financial Resource Strain: Not on file   Food Insecurity: Not on file   Transportation Needs: Not on file   Physical Activity: Not on file   Stress: Not on file   Social Connections: Not on file   Intimate Partner Violence: Not on file   Housing Stability: Not on file       Current Outpatient Medications:     cyclobenzaprine (FLEXERIL) 5 mg tablet, Take 1 tablet (5 mg total) by mouth 3 (three) times a day as needed for muscle spasms, Disp: 30 tablet, Rfl: 0    ergocalciferol (VITAMIN D2) 50,000 units, Take 1 capsule  (50,000 Units total) by mouth once a week, Disp: 12 capsule, Rfl: 0    escitalopram (LEXAPRO) 5 mg tablet, Take 1 tablet (5 mg total) by mouth daily, Disp: 30 tablet, Rfl: 0    buPROPion (WELLBUTRIN SR) 200 MG 12 hr tablet, Take 1 tablet (200 mg total) by mouth 2 (two) times a day, Disp: 60 tablet, Rfl: 1  No Known Allergies  Vitals:    02/04/25 1357   BP: 113/75   Pulse: 99   Temp: 97.6 °F (36.4 °C)   SpO2: 96%       Physical Exam  Constitutional:       Appearance: She is well-developed.   HENT:      Head: Normocephalic and atraumatic.   Eyes:      Pupils: Pupils are equal, round, and reactive to light.   Cardiovascular:      Rate and Rhythm: Normal rate.      Heart sounds: No murmur heard.  Pulmonary:      Effort: No respiratory distress.      Breath sounds: No wheezing or rales.   Abdominal:      General: There is no distension.      Palpations: Abdomen is soft.      Tenderness: There is no abdominal tenderness. There is no rebound.   Musculoskeletal:         General: No tenderness.      Cervical back: Neck supple.   Lymphadenopathy:      Cervical: No cervical adenopathy.   Skin:     General: Skin is warm.      Findings: No rash.   Neurological:      Mental Status: She is alert and oriented to person, place, and time.      Deep Tendon Reflexes: Reflexes normal.   Psychiatric:         Thought Content: Thought content normal.           Labs:  CBC, Coags, BMP, Mg, Phos     Imaging  No results found.  I reviewed the above laboratory and imaging data.    Discussion/Summary: In summary, this is a 29-year-old female with history of recurrent miscarriage.  Protein S elevation is of no clinical significance and is not contributing to these events.  Time elevated PTT during lupus anticoagulant likely reflects technical issue rather than underlying lupus anticoagulant.  I note that she was not on heparin at the time that this was carried out.  In discussing this she reports that it is often the case that blood test tubes  cannot be completely filled due to poor access, etc.  In all of this, I think coagulopathy is not an identifiable contributor to her recurrent fetal loss.  I reviewed the above with the patient.  She voiced understanding.

## 2025-02-05 RX ORDER — ESCITALOPRAM OXALATE 5 MG/1
5 TABLET ORAL DAILY
Qty: 30 TABLET | Refills: 0 | Status: SHIPPED | OUTPATIENT
Start: 2025-02-05

## 2025-02-05 RX ORDER — ERGOCALCIFEROL 1.25 MG/1
50000 CAPSULE, LIQUID FILLED ORAL WEEKLY
Qty: 12 CAPSULE | Refills: 0 | Status: SHIPPED | OUTPATIENT
Start: 2025-02-05

## 2025-02-11 LAB

## 2025-02-25 ENCOUNTER — APPOINTMENT (OUTPATIENT)
Dept: LAB | Facility: MEDICAL CENTER | Age: 30
End: 2025-02-25
Payer: COMMERCIAL

## 2025-02-25 ENCOUNTER — OFFICE VISIT (OUTPATIENT)
Dept: FAMILY MEDICINE CLINIC | Facility: CLINIC | Age: 30
End: 2025-02-25
Payer: COMMERCIAL

## 2025-02-25 VITALS
HEIGHT: 63 IN | OXYGEN SATURATION: 99 % | SYSTOLIC BLOOD PRESSURE: 110 MMHG | BODY MASS INDEX: 49.61 KG/M2 | HEART RATE: 92 BPM | TEMPERATURE: 98 F | DIASTOLIC BLOOD PRESSURE: 70 MMHG | RESPIRATION RATE: 17 BRPM | WEIGHT: 280 LBS

## 2025-02-25 DIAGNOSIS — F32.A ANXIETY AND DEPRESSION: ICD-10-CM

## 2025-02-25 DIAGNOSIS — F41.9 ANXIETY AND DEPRESSION: ICD-10-CM

## 2025-02-25 DIAGNOSIS — O03.9 MISCARRIAGE: ICD-10-CM

## 2025-02-25 DIAGNOSIS — N96 RECURRENT PREGNANCY LOSS: ICD-10-CM

## 2025-02-25 PROCEDURE — 85732 THROMBOPLASTIN TIME PARTIAL: CPT

## 2025-02-25 PROCEDURE — 85705 THROMBOPLASTIN INHIBITION: CPT

## 2025-02-25 PROCEDURE — 36415 COLL VENOUS BLD VENIPUNCTURE: CPT

## 2025-02-25 PROCEDURE — 85613 RUSSELL VIPER VENOM DILUTED: CPT

## 2025-02-25 PROCEDURE — 99213 OFFICE O/P EST LOW 20 MIN: CPT | Performed by: PHYSICIAN ASSISTANT

## 2025-02-25 PROCEDURE — 85670 THROMBIN TIME PLASMA: CPT

## 2025-02-25 RX ORDER — ESCITALOPRAM OXALATE 5 MG/1
5 TABLET ORAL DAILY
Qty: 90 TABLET | Refills: 0 | Status: SHIPPED | OUTPATIENT
Start: 2025-02-25

## 2025-02-25 NOTE — ASSESSMENT & PLAN NOTE
Doing well, continue same. Refill given. RTO 3 months or sooner PRN.    Orders:  •  escitalopram (LEXAPRO) 5 mg tablet; Take 1 tablet (5 mg total) by mouth daily

## 2025-02-25 NOTE — PROGRESS NOTES
"Name: Paz Pierre      : 1995      MRN: [id number]  Encounter Provider: Denise Baca PA-C  Encounter Date: 2025   Encounter department: [facility]  :  Assessment & Plan  Anxiety and depression  Doing well, continue same. Refill given. RTO 3 months or sooner PRN.    Orders:  •  escitalopram (LEXAPRO) 5 mg tablet; Take 1 tablet (5 mg total) by mouth daily           History of Present Illness   Pt here today to follow up for anxiety/depression. Doing well with Lexapro, feels that current dose is working well for her, would like to continue.      Review of Systems   Constitutional:  Negative for chills and fever.   HENT:  Negative for ear pain and sore throat.    Eyes:  Negative for pain and visual disturbance.   Respiratory:  Negative for cough and shortness of breath.    Cardiovascular:  Negative for chest pain and palpitations.   Gastrointestinal:  Negative for abdominal pain and vomiting.   Genitourinary:  Negative for dysuria and hematuria.   Musculoskeletal:  Negative for arthralgias and back pain.   Skin:  Negative for color change and rash.   Neurological:  Negative for seizures and syncope.   All other systems reviewed and are negative.      Objective   /70   Pulse 92   Temp 98 °F (36.7 °C) (Temporal)   Resp 17   Ht 5' 3\" (1.6 m)   Wt 127 kg (280 lb)   SpO2 99%   BMI 49.60 kg/m²      Physical Exam  Vitals reviewed.   Constitutional:       General: She is not in acute distress.     Appearance: She is well-developed. She is not diaphoretic.   HENT:      Head: Normocephalic and atraumatic.      Right Ear: Hearing, tympanic membrane, ear canal and external ear normal.      Left Ear: Hearing, tympanic membrane, ear canal and external ear normal.      Nose: Nose normal.      Mouth/Throat:      Mouth: Mucous membranes are moist.      Pharynx: Oropharynx is clear. Uvula midline. No oropharyngeal exudate.   Eyes:      General: No scleral icterus.        Right eye: No " discharge.         Left eye: No discharge.      Conjunctiva/sclera: Conjunctivae normal.   Neck:      Thyroid: No thyromegaly.      Vascular: No carotid bruit.   Cardiovascular:      Rate and Rhythm: Normal rate and regular rhythm.      Heart sounds: Normal heart sounds. No murmur heard.  Pulmonary:      Effort: Pulmonary effort is normal. No respiratory distress.      Breath sounds: Normal breath sounds. No wheezing.   Abdominal:      General: Bowel sounds are normal. There is no distension.      Palpations: Abdomen is soft. There is no mass.      Tenderness: There is no abdominal tenderness. There is no guarding or rebound.   Musculoskeletal:         General: No tenderness. Normal range of motion.      Cervical back: Neck supple.   Lymphadenopathy:      Cervical: No cervical adenopathy.   Skin:     General: Skin is warm and dry.      Findings: No erythema or rash.   Neurological:      Mental Status: She is alert and oriented to person, place, and time.   Psychiatric:         Behavior: Behavior normal.         Thought Content: Thought content normal.         Judgment: Judgment normal.

## 2025-02-27 LAB
APTT SCREEN TO CONFIRM RATIO: 1.16 RATIO (ref 0–1.34)
CONFIRM APTT/NORMAL: 34.3 SEC (ref 0–47.6)
LA PPP-IMP: NORMAL
SCREEN APTT: 32.9 SEC (ref 0–43.5)
SCREEN DRVVT: 40.7 SEC (ref 0–47)
THROMBIN TIME: 17.4 SEC (ref 0–23)

## 2025-03-10 DIAGNOSIS — F32.A ANXIETY AND DEPRESSION: ICD-10-CM

## 2025-03-10 DIAGNOSIS — F41.9 ANXIETY AND DEPRESSION: ICD-10-CM

## 2025-03-11 RX ORDER — ESCITALOPRAM OXALATE 5 MG/1
5 TABLET ORAL DAILY
Qty: 90 TABLET | Refills: 0 | OUTPATIENT
Start: 2025-03-11

## 2025-03-25 ENCOUNTER — OFFICE VISIT (OUTPATIENT)
Dept: SLEEP CENTER | Facility: CLINIC | Age: 30
End: 2025-03-25
Payer: COMMERCIAL

## 2025-03-25 VITALS
DIASTOLIC BLOOD PRESSURE: 80 MMHG | BODY MASS INDEX: 50.85 KG/M2 | WEIGHT: 287 LBS | SYSTOLIC BLOOD PRESSURE: 113 MMHG | HEART RATE: 85 BPM | HEIGHT: 63 IN

## 2025-03-25 DIAGNOSIS — G47.33 OSA (OBSTRUCTIVE SLEEP APNEA): Primary | ICD-10-CM

## 2025-03-25 PROCEDURE — 99214 OFFICE O/P EST MOD 30 MIN: CPT | Performed by: PHYSICIAN ASSISTANT

## 2025-03-25 NOTE — PATIENT INSTRUCTIONS
Continue to use your CPAP nightly.  Please try to sleep with your CPAP throughout the duration of the night.  Overall, your compliance data looks great.  Your sleep apnea is very well-controlled when you are using your CPAP.

## 2025-03-25 NOTE — ASSESSMENT & PLAN NOTE
Patient has mild obstructive sleep apnea worsening to moderate during REM and supine sleep.  She began use of auto CPAP on 2/11/2025.  She finds treatment to be beneficial and effective.  I encouraged her to use her CPAP nightly or anytime she is sleeping.  Follow-up in 6 months or sooner if she has any concerns.

## 2025-03-25 NOTE — PROGRESS NOTES
Name: Paz Pierre      : 1995      MRN: [id number]  Encounter Provider: Peggy Castañeda PA-C  Encounter Date: 3/25/2025   Encounter department: [facility] [place]  :  Assessment & Plan  MAXIMINO (obstructive sleep apnea)  Patient has mild obstructive sleep apnea worsening to moderate during REM and supine sleep.  She began use of auto CPAP on 2025.  She finds treatment to be beneficial and effective.  I encouraged her to use her CPAP nightly or anytime she is sleeping.  Follow-up in 6 months or sooner if she has any concerns.           History of Present Illness   HPI Paz Pierre is a 29 female who presents today to follow-up obstructive sleep apnea.  She initially saw KELL Li on 24 for symptoms of excessive daytime sleepiness, unrefreshing sleep, and nightmares.  She then completed home sleep study on 24 which did not demonstrate obstructive sleep apnea as her ELIE was 2.3.  Due to continued symptoms, she then completed a diagnostic polysomnogram on 2024, which demonstrated mild obstructive sleep apnea with an AHI of 5.3, supine AHI of 24.8, and REM AHI of 25.1.  Auto CPAP was then ordered and set up on 2025.  Patient states she has been using her CPAP consistently.  She has no major concerns or complaints regarding treatment.  She did miss several days due to an upper respiratory infection.  She feels her sleep is deeper, less restless, and she feels less sleepy during the day.  However, she is in the process of moving with her  and young daughter.  She states this time has been quite stressful.  She was hoping to feel even more effects from the CPAP after her move is completed and her stress levels are less.    CPAP  ResMed AirSense 11 set up on 2025  Pressure set at 5 to 20 cm of H2O  Mask: Nasal pillow medium  DME provider: [employer] [facility]    Compliance   days, 77%  Greater than 4 hours 67%  Average session of 4 hours and 51  "minutes nightly  Residual AHI of 0.5    Sleep Schedule   Bedtime: 930 to 10 PM  Falls asleep within minutes  1 awakening per night approximately 5 AM when her daughter comes into her bedroom, patient finds it difficult to use her CPAP after her daughter is in the bedroom.  Wake up time 6:30 AM, typically sleeps without her CPAP for 1 to 1.5 hours    Sitting and reading: Would never doze  Watching TV: Would never doze  Sitting, inactive in a public place (e.g. a theatre or a meeting): Would never doze  As a passenger in a car for an hour without a break: Would never doze  Lying down to rest in the afternoon when circumstances permit: Slight chance of dozing  Sitting and talking to someone: Would never doze  Sitting quietly after a lunch without alcohol: Would never doze  In a car, while stopped for a few minutes in traffic: Would never doze  Total score: 1     Review of Systems   HENT: Negative.     Respiratory: Negative.     Cardiovascular: Negative.    Gastrointestinal:  Negative for abdominal distention.   Neurological:  Negative for headaches.   Psychiatric/Behavioral:  Negative for sleep disturbance.      Pertinent positives/negatives included in HPI and also as noted:       Objective   /80 (BP Location: Left arm, Patient Position: Sitting, Cuff Size: Adult)   Pulse 85   Ht 5' 3\" (1.6 m)   Wt 130 kg (287 lb)   BMI 50.84 kg/m²        Physical Exam  Constitutional:       General: She is not in acute distress.     Appearance: She is not ill-appearing.   Neurological:      Mental Status: She is alert and oriented to person, place, and time.   Psychiatric:         Mood and Affect: Mood normal.         Data  Lab Results   Component Value Date    HGB 13.3 07/03/2024    HCT 42.0 07/03/2024    MCV 88 07/03/2024      Lab Results   Component Value Date    CALCIUM 8.9 08/29/2024    K 3.7 08/29/2024    CO2 23 08/29/2024     08/29/2024    BUN 12 08/29/2024    CREATININE 0.72 08/29/2024     Lab Results "   Component Value Date    IRON 48 (L) 09/13/2023    TIBC 441 09/13/2023    FERRITIN 34 04/10/2024     Lab Results   Component Value Date    AST 16 08/29/2024    ALT 31 08/29/2024

## 2025-04-15 ENCOUNTER — OFFICE VISIT (OUTPATIENT)
Dept: FAMILY MEDICINE CLINIC | Facility: CLINIC | Age: 30
End: 2025-04-15
Payer: COMMERCIAL

## 2025-04-15 VITALS
SYSTOLIC BLOOD PRESSURE: 134 MMHG | WEIGHT: 290.2 LBS | HEART RATE: 86 BPM | TEMPERATURE: 98.3 F | DIASTOLIC BLOOD PRESSURE: 64 MMHG | OXYGEN SATURATION: 97 % | BODY MASS INDEX: 51.41 KG/M2

## 2025-04-15 DIAGNOSIS — Z00.00 ANNUAL PHYSICAL EXAM: Primary | ICD-10-CM

## 2025-04-15 PROCEDURE — 99395 PREV VISIT EST AGE 18-39: CPT | Performed by: PHYSICIAN ASSISTANT

## 2025-04-15 NOTE — PROGRESS NOTES
Adult Annual Physical  Name: Paz Pierre      : 1995      MRN: [id number]  Encounter Provider: Denise Baca PA-C  Encounter Date: 4/15/2025   Encounter department: [facility]    :  Assessment & Plan  Annual physical exam         Annual physical exam               Preventive Screenings:  - Diabetes Screening: screening not indicated and has diabetes  - Cholesterol Screening: screening not indicated and has hyperlipidemia   - HIV screening: screening up-to-date   - Cervical cancer screening: screening up-to-date   - Lung cancer screening: screening not indicated     Immunizations:  - Immunizations due: Influenza and Prevnar 20         History of Present Illness     Adult Annual Physical:  Patient presents for annual physical. Pt here today for annual visit. No new complaints at this time..     Review of Systems   Constitutional:  Negative for chills and fever.   HENT:  Negative for ear pain and sore throat.    Eyes:  Negative for pain and visual disturbance.   Respiratory:  Negative for cough and shortness of breath.    Cardiovascular:  Negative for chest pain and palpitations.   Gastrointestinal:  Negative for abdominal pain and vomiting.   Genitourinary:  Negative for dysuria and hematuria.   Musculoskeletal:  Negative for arthralgias and back pain.   Skin:  Negative for color change and rash.   Neurological:  Negative for seizures and syncope.   All other systems reviewed and are negative.        Objective   /64   Pulse 86   Temp 98.3 °F (36.8 °C)   Wt 132 kg (290 lb 3.2 oz)   SpO2 97%   BMI 51.41 kg/m²     Physical Exam  Vitals reviewed.   Constitutional:       General: She is not in acute distress.     Appearance: She is well-developed. She is not diaphoretic.   HENT:      Head: Normocephalic and atraumatic.      Right Ear: Hearing, tympanic membrane, ear canal and external ear normal.      Left Ear: Hearing, tympanic membrane, ear canal and external ear normal.       Nose: Nose normal.      Mouth/Throat:      Mouth: Mucous membranes are moist.      Pharynx: Oropharynx is clear. Uvula midline. No oropharyngeal exudate.   Eyes:      General: No scleral icterus.        Right eye: No discharge.         Left eye: No discharge.      Conjunctiva/sclera: Conjunctivae normal.   Neck:      Thyroid: No thyromegaly.      Vascular: No carotid bruit.   Cardiovascular:      Rate and Rhythm: Normal rate and regular rhythm.      Heart sounds: Normal heart sounds. No murmur heard.  Pulmonary:      Effort: Pulmonary effort is normal. No respiratory distress.      Breath sounds: Normal breath sounds. No wheezing.   Abdominal:      General: Bowel sounds are normal. There is no distension.      Palpations: Abdomen is soft. There is no mass.      Tenderness: There is no abdominal tenderness. There is no guarding or rebound.   Musculoskeletal:         General: No tenderness. Normal range of motion.      Cervical back: Neck supple.   Lymphadenopathy:      Cervical: No cervical adenopathy.   Skin:     General: Skin is warm and dry.      Findings: No erythema or rash.   Neurological:      Mental Status: She is alert and oriented to person, place, and time.   Psychiatric:         Behavior: Behavior normal.         Thought Content: Thought content normal.         Judgment: Judgment normal.

## 2025-04-15 NOTE — PATIENT INSTRUCTIONS
"Patient Education     Routine physical for adults   The Basics   Written by the doctors and editors at [facility]   What is a physical? -- A physical is a routine visit, or \"check-up,\" with your doctor. You might also hear it called a \"wellness visit\" or \"preventive visit.\"  During each visit, the doctor will:   Ask about your physical and mental health   Ask about your habits, behaviors, and lifestyle   Do an exam   Give you vaccines if needed   Talk to you about any medicines you take   Give advice about your health   Answer your questions  Getting regular check-ups is an important part of taking care of your health. It can help your doctor find and treat any problems you have. But it's also important for preventing health problems.  A routine physical is different from a \"sick visit.\" A sick visit is when you see a doctor because of a health concern or problem. Since physicals are scheduled ahead of time, you can think about what you want to ask the doctor.  How often should I get a physical? -- It depends on your age and health. In general, for people age 21 years and older:   If you are younger than 50 years, you might be able to get a physical every 3 years.   If you are 50 years or older, your doctor might recommend a physical every year.  If you have an ongoing health condition, like diabetes or high blood pressure, your doctor will probably want to see you more often.  What happens during a physical? -- In general, each visit will include:   Physical exam - The doctor or nurse will check your height, weight, heart rate, and blood pressure. They will also look at your eyes and ears. They will ask about how you are feeling and whether you have any symptoms that bother you.   Medicines - It's a good idea to bring a list of all the medicines you take to each doctor visit. Your doctor will talk to you about your medicines and answer any questions. Tell them if you are having any side effects that bother you. You " "should also tell them if you are having trouble paying for any of your medicines.   Habits and behaviors - This includes:   Your diet   Your exercise habits   Whether you smoke, drink alcohol, or use drugs   Whether you are sexually active   Whether you feel safe at home  Your doctor will talk to you about things you can do to improve your health and lower your risk of health problems. They will also offer help and support. For example, if you want to quit smoking, they can give you advice and might prescribe medicines. If you want to improve your diet or get more physical activity, they can help you with this, too.   Lab tests, if needed - The tests you get will depend on your age and situation. For example, your doctor might want to check your:   Cholesterol   Blood sugar   Iron level   Vaccines - The recommended vaccines will depend on your age, health, and what vaccines you already had. Vaccines are very important because they can prevent certain serious or deadly infections.   Discussion of screening - \"Screening\" means checking for diseases or other health problems before they cause symptoms. Your doctor can recommend screening based on your age, risk, and preferences. This might include tests to check for:   Cancer, such as breast, prostate, cervical, ovarian, colorectal, prostate, lung, or skin cancer   Sexually transmitted infections, such as chlamydia and gonorrhea   Mental health conditions like depression and anxiety  Your doctor will talk to you about the different types of screening tests. They can help you decide which screenings to have. They can also explain what the results might mean.   Answering questions - The physical is a good time to ask the doctor or nurse questions about your health. If needed, they can refer you to other doctors or specialists, too.  Adults older than 65 years often need other care, too. As you get older, your doctor will talk to you about:   How to prevent falling at " home   Hearing or vision tests   Memory testing   How to take your medicines safely   Making sure that you have the help and support you need at home  All topics are updated as new evidence becomes available and our peer review process is complete.  This topic retrieved from [employer] on: May 02, 2024.  Topic 331156 Version 1.0  Release: 32.4.3 - C32.122  © 2024 UpToDate, [employer] and/or its affiliates. All rights reserved.  Consumer Information Use and Disclaimer   Disclaimer: This generalized information is a limited summary of diagnosis, treatment, and/or medication information. It is not meant to be comprehensive and should be used as a tool to help the user understand and/or assess potential diagnostic and treatment options. It does NOT include all information about conditions, treatments, medications, side effects, or risks that may apply to a specific patient. It is not intended to be medical advice or a substitute for the medical advice, diagnosis, or treatment of a health care provider based on the health care provider's examination and assessment of a patient's specific and unique circumstances. Patients must speak with a health care provider for complete information about their health, medical questions, and treatment options, including any risks or benefits regarding use of medications. This information does not endorse any treatments or medications as safe, effective, or approved for treating a specific patient. Up[employer]Date, Inc. and its affiliates disclaim any warranty or liability relating to this information or the use thereof.The use of this information is governed by the Terms of Use, available at https://www.[employer].com/en/know/clinical-effectiveness-terms. 2024© UpToDate, Inc. and its affiliates and/or licensors. All rights reserved.  Copyright   © 2024 [employer], [employer]. and/or its affiliates. All rights reserved.

## 2025-05-01 DIAGNOSIS — F32.A ANXIETY AND DEPRESSION: ICD-10-CM

## 2025-05-01 DIAGNOSIS — E55.9 VITAMIN D DEFICIENCY: ICD-10-CM

## 2025-05-01 DIAGNOSIS — F41.9 ANXIETY AND DEPRESSION: ICD-10-CM

## 2025-05-02 RX ORDER — ESCITALOPRAM OXALATE 5 MG/1
5 TABLET ORAL DAILY
Qty: 90 TABLET | Refills: 0 | Status: SHIPPED | OUTPATIENT
Start: 2025-05-02

## 2025-05-02 RX ORDER — ERGOCALCIFEROL 1.25 MG/1
50000 CAPSULE, LIQUID FILLED ORAL WEEKLY
Qty: 12 CAPSULE | Refills: 0 | Status: SHIPPED | OUTPATIENT
Start: 2025-05-02

## 2025-07-17 ENCOUNTER — TELEPHONE (OUTPATIENT)
Age: 30
End: 2025-07-17

## 2025-07-17 DIAGNOSIS — F41.9 ANXIETY AND DEPRESSION: ICD-10-CM

## 2025-07-17 DIAGNOSIS — E55.9 VITAMIN D DEFICIENCY: ICD-10-CM

## 2025-07-17 DIAGNOSIS — R73.09 ELEVATED GLUCOSE: Primary | ICD-10-CM

## 2025-07-17 DIAGNOSIS — F32.A ANXIETY AND DEPRESSION: ICD-10-CM

## 2025-07-17 RX ORDER — ESCITALOPRAM OXALATE 10 MG/1
10 TABLET ORAL DAILY
Qty: 30 TABLET | Refills: 0 | Status: SHIPPED | OUTPATIENT
Start: 2025-07-17

## 2025-07-17 RX ORDER — ERGOCALCIFEROL 1.25 MG/1
50000 CAPSULE, LIQUID FILLED ORAL WEEKLY
Qty: 12 CAPSULE | Refills: 0 | Status: SHIPPED | OUTPATIENT
Start: 2025-07-17

## 2025-07-17 NOTE — TELEPHONE ENCOUNTER
Patient is requesting labs orders be placed for an A1C check. Patient is scheduled for her 1 month follow up on 8/18 with Denise. Please advise.

## 2025-07-17 NOTE — TELEPHONE ENCOUNTER
Patient is requesting if PCP feels its okay to increase her medication dose (escitalopram (LEXAPRO) 5 mg tablet). Pt isn't sure what's appropriate or dosing for next steps. Pt states maybe 10mg if okay. Pt would like to discuss this with the Angeli Baca.    Also, pt would like Blood work orders placed for her AC1. Pt feels she needs to check this as well.  PCP please call pt back to further advise.

## 2025-07-17 NOTE — TELEPHONE ENCOUNTER
Why does she want to increase dose? What are symptoms? RN needs to get more information in these calls!!
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
## 2025-07-26 ENCOUNTER — APPOINTMENT (OUTPATIENT)
Dept: LAB | Facility: MEDICAL CENTER | Age: 30
End: 2025-07-26
Attending: PHYSICIAN ASSISTANT
Payer: COMMERCIAL

## 2025-07-26 DIAGNOSIS — R73.09 ELEVATED GLUCOSE: ICD-10-CM

## 2025-07-26 LAB
EST. AVERAGE GLUCOSE BLD GHB EST-MCNC: 126 MG/DL
HBA1C MFR BLD: 6 %

## 2025-07-26 PROCEDURE — 36415 COLL VENOUS BLD VENIPUNCTURE: CPT

## 2025-07-26 PROCEDURE — 83036 HEMOGLOBIN GLYCOSYLATED A1C: CPT

## (undated) DEVICE — PACK C-SECTION PBDS

## (undated) DEVICE — SWABSTCK, BENZOIN TINCTURE, 1/PK, STRL: Brand: APLICARE

## (undated) DEVICE — SUT VICRYL 0 CT-1 36 IN J946H

## (undated) DEVICE — 3M™ STERI-STRIP™ REINFORCED ADHESIVE SKIN CLOSURES, R1547, 1/2 IN X 4 IN (12 MM X 100 MM), 6 STRIPS/ENVELOPE: Brand: 3M™ STERI-STRIP™

## (undated) DEVICE — SUT CHROMIC 0 CT-1 27 IN 812H

## (undated) DEVICE — SUT MONOCRYL 4-0 PS-2 27 IN Y426H

## (undated) DEVICE — ADHESIVE SKIN HIGH VISCOSITY EXOFIN 1ML

## (undated) DEVICE — GLOVE SRG BIOGEL ECLIPSE 7

## (undated) DEVICE — GLOVE INDICATOR PI UNDERGLOVE SZ 7.5 BLUE

## (undated) DEVICE — SUT VICRYL 0 CTX 36 IN J978H

## (undated) DEVICE — ABG MICROSTICKS SAFETY

## (undated) DEVICE — CHLORAPREP HI-LITE 26ML ORANGE